# Patient Record
Sex: FEMALE | Race: WHITE | NOT HISPANIC OR LATINO
[De-identification: names, ages, dates, MRNs, and addresses within clinical notes are randomized per-mention and may not be internally consistent; named-entity substitution may affect disease eponyms.]

---

## 2017-04-24 ENCOUNTER — APPOINTMENT (OUTPATIENT)
Dept: INTERNAL MEDICINE | Facility: CLINIC | Age: 72
End: 2017-04-24

## 2017-04-24 VITALS
SYSTOLIC BLOOD PRESSURE: 129 MMHG | BODY MASS INDEX: 25.21 KG/M2 | DIASTOLIC BLOOD PRESSURE: 80 MMHG | HEART RATE: 91 BPM | OXYGEN SATURATION: 97 % | HEIGHT: 62 IN | WEIGHT: 137 LBS

## 2017-04-24 DIAGNOSIS — Z87.440 PERSONAL HISTORY OF URINARY (TRACT) INFECTIONS: ICD-10-CM

## 2017-04-24 DIAGNOSIS — Z83.49 FAMILY HISTORY OF OTHER ENDOCRINE, NUTRITIONAL AND METABOLIC DISEASES: ICD-10-CM

## 2017-04-24 DIAGNOSIS — Z82.61 FAMILY HISTORY OF ARTHRITIS: ICD-10-CM

## 2017-04-24 DIAGNOSIS — Z82.69 FAMILY HISTORY OF OTHER DISEASES OF THE MUSCULOSKELETAL SYSTEM AND CONNECTIVE TISSUE: ICD-10-CM

## 2017-04-24 DIAGNOSIS — Z87.891 PERSONAL HISTORY OF NICOTINE DEPENDENCE: ICD-10-CM

## 2017-04-24 DIAGNOSIS — Z82.49 FAMILY HISTORY OF ISCHEMIC HEART DISEASE AND OTHER DISEASES OF THE CIRCULATORY SYSTEM: ICD-10-CM

## 2017-04-24 DIAGNOSIS — Z83.6 FAMILY HISTORY OF OTHER DISEASES OF THE RESPIRATORY SYSTEM: ICD-10-CM

## 2017-04-24 DIAGNOSIS — Z78.9 OTHER SPECIFIED HEALTH STATUS: ICD-10-CM

## 2017-04-26 PROBLEM — Z82.49 FAMILY HISTORY OF HYPERTENSION: Status: ACTIVE | Noted: 2017-04-24

## 2017-04-26 PROBLEM — Z83.6 FAMILY HISTORY OF PULMONARY FIBROSIS: Status: ACTIVE | Noted: 2017-04-26

## 2017-05-03 ENCOUNTER — FORM ENCOUNTER (OUTPATIENT)
Age: 72
End: 2017-05-03

## 2017-05-04 ENCOUNTER — OUTPATIENT (OUTPATIENT)
Dept: OUTPATIENT SERVICES | Facility: HOSPITAL | Age: 72
LOS: 1 days | End: 2017-05-04
Payer: MEDICARE

## 2017-05-04 ENCOUNTER — APPOINTMENT (OUTPATIENT)
Dept: MAMMOGRAPHY | Facility: IMAGING CENTER | Age: 72
End: 2017-05-04

## 2017-05-04 ENCOUNTER — APPOINTMENT (OUTPATIENT)
Dept: RADIOLOGY | Facility: IMAGING CENTER | Age: 72
End: 2017-05-04

## 2017-05-04 ENCOUNTER — APPOINTMENT (OUTPATIENT)
Dept: ULTRASOUND IMAGING | Facility: IMAGING CENTER | Age: 72
End: 2017-05-04

## 2017-05-04 DIAGNOSIS — Z00.8 ENCOUNTER FOR OTHER GENERAL EXAMINATION: ICD-10-CM

## 2017-05-04 PROCEDURE — 76775 US EXAM ABDO BACK WALL LIM: CPT

## 2017-05-04 PROCEDURE — 77063 BREAST TOMOSYNTHESIS BI: CPT

## 2017-05-04 PROCEDURE — 77067 SCR MAMMO BI INCL CAD: CPT

## 2017-05-08 LAB
25(OH)D3 SERPL-MCNC: 50.4 NG/ML
ALBUMIN SERPL ELPH-MCNC: 4.5 G/DL
ALP BLD-CCNC: 91 U/L
ALT SERPL-CCNC: 12 U/L
ANION GAP SERPL CALC-SCNC: 18 MMOL/L
AST SERPL-CCNC: 21 U/L
BASOPHILS # BLD AUTO: 0.04 K/UL
BASOPHILS NFR BLD AUTO: 0.5 %
BILIRUB SERPL-MCNC: 0.3 MG/DL
BUN SERPL-MCNC: 16 MG/DL
CALCIUM SERPL-MCNC: 9.9 MG/DL
CHLORIDE SERPL-SCNC: 100 MMOL/L
CHOLEST SERPL-MCNC: 241 MG/DL
CHOLEST/HDLC SERPL: 2.6 RATIO
CO2 SERPL-SCNC: 22 MMOL/L
CREAT SERPL-MCNC: 0.79 MG/DL
EOSINOPHIL # BLD AUTO: 0.16 K/UL
EOSINOPHIL NFR BLD AUTO: 2 %
GLUCOSE SERPL-MCNC: 95 MG/DL
HCT VFR BLD CALC: 39.8 %
HCV AB SER QL: NONREACTIVE
HCV S/CO RATIO: 0.15 S/CO
HDLC SERPL-MCNC: 94 MG/DL
HGB BLD-MCNC: 13.5 G/DL
IMM GRANULOCYTES NFR BLD AUTO: 0.3 %
LDLC SERPL CALC-MCNC: 128 MG/DL
LYMPHOCYTES # BLD AUTO: 2.44 K/UL
LYMPHOCYTES NFR BLD AUTO: 30.5 %
MAN DIFF?: NORMAL
MCHC RBC-ENTMCNC: 31.3 PG
MCHC RBC-ENTMCNC: 33.9 GM/DL
MCV RBC AUTO: 92.3 FL
MONOCYTES # BLD AUTO: 0.58 K/UL
MONOCYTES NFR BLD AUTO: 7.3 %
NEUTROPHILS # BLD AUTO: 4.76 K/UL
NEUTROPHILS NFR BLD AUTO: 59.4 %
PLATELET # BLD AUTO: 255 K/UL
POTASSIUM SERPL-SCNC: 4.7 MMOL/L
PROT SERPL-MCNC: 7.3 G/DL
RBC # BLD: 4.31 M/UL
RBC # FLD: 12.3 %
SODIUM SERPL-SCNC: 140 MMOL/L
TRIGL SERPL-MCNC: 95 MG/DL
VZV AB TITR SER: POSITIVE
VZV IGG SER IF-ACNC: 421.4 INDEX
WBC # FLD AUTO: 8 K/UL

## 2017-09-29 ENCOUNTER — MEDICATION RENEWAL (OUTPATIENT)
Age: 72
End: 2017-09-29

## 2018-04-23 ENCOUNTER — RX RENEWAL (OUTPATIENT)
Age: 73
End: 2018-04-23

## 2018-07-25 ENCOUNTER — APPOINTMENT (OUTPATIENT)
Dept: INTERNAL MEDICINE | Facility: CLINIC | Age: 73
End: 2018-07-25
Payer: MEDICARE

## 2018-07-25 VITALS
BODY MASS INDEX: 24.66 KG/M2 | HEART RATE: 83 BPM | WEIGHT: 134 LBS | HEIGHT: 61.75 IN | SYSTOLIC BLOOD PRESSURE: 120 MMHG | OXYGEN SATURATION: 97 % | DIASTOLIC BLOOD PRESSURE: 70 MMHG

## 2018-07-25 DIAGNOSIS — R19.8 OTHER SPECIFIED SYMPTOMS AND SIGNS INVOLVING THE DIGESTIVE SYSTEM AND ABDOMEN: ICD-10-CM

## 2018-07-25 PROCEDURE — G0439: CPT | Mod: 25

## 2018-07-25 PROCEDURE — 69210 REMOVE IMPACTED EAR WAX UNI: CPT

## 2018-07-25 PROCEDURE — G0009: CPT

## 2018-07-25 PROCEDURE — 90670 PCV13 VACCINE IM: CPT

## 2018-07-25 RX ORDER — MULTIVITAMIN
TABLET ORAL
Refills: 0 | Status: DISCONTINUED | COMMUNITY
End: 2018-07-25

## 2018-07-25 NOTE — PAST MEDICAL HISTORY
[Postmenopausal] : history of menopause having occurred [Menopause Age____] : age at menopause was [unfilled] [Total Preg ___] : G: [unfilled]

## 2018-08-01 LAB
25(OH)D3 SERPL-MCNC: 50 NG/ML
ALBUMIN SERPL ELPH-MCNC: 4.8 G/DL
ALP BLD-CCNC: 89 U/L
ALT SERPL-CCNC: 11 U/L
ANION GAP SERPL CALC-SCNC: 15 MMOL/L
AST SERPL-CCNC: 20 U/L
BASOPHILS # BLD AUTO: 0.02 K/UL
BASOPHILS NFR BLD AUTO: 0.3 %
BILIRUB SERPL-MCNC: 0.4 MG/DL
BUN SERPL-MCNC: 13 MG/DL
CALCIUM SERPL-MCNC: 10.2 MG/DL
CHLORIDE SERPL-SCNC: 101 MMOL/L
CHOLEST SERPL-MCNC: 168 MG/DL
CHOLEST/HDLC SERPL: 1.9 RATIO
CO2 SERPL-SCNC: 25 MMOL/L
CREAT SERPL-MCNC: 0.8 MG/DL
EOSINOPHIL # BLD AUTO: 0.19 K/UL
EOSINOPHIL NFR BLD AUTO: 3.3 %
GLUCOSE SERPL-MCNC: 105 MG/DL
HCT VFR BLD CALC: 41.3 %
HDLC SERPL-MCNC: 90 MG/DL
HGB BLD-MCNC: 13.8 G/DL
IMM GRANULOCYTES NFR BLD AUTO: 0.2 %
LDLC SERPL CALC-MCNC: 65 MG/DL
LYMPHOCYTES # BLD AUTO: 1.71 K/UL
LYMPHOCYTES NFR BLD AUTO: 29.6 %
MAN DIFF?: NORMAL
MCHC RBC-ENTMCNC: 31.4 PG
MCHC RBC-ENTMCNC: 33.4 GM/DL
MCV RBC AUTO: 94.1 FL
MONOCYTES # BLD AUTO: 0.6 K/UL
MONOCYTES NFR BLD AUTO: 10.4 %
NEUTROPHILS # BLD AUTO: 3.25 K/UL
NEUTROPHILS NFR BLD AUTO: 56.2 %
PLATELET # BLD AUTO: 246 K/UL
POTASSIUM SERPL-SCNC: 5 MMOL/L
PROT SERPL-MCNC: 7.3 G/DL
RBC # BLD: 4.39 M/UL
RBC # FLD: 12.5 %
SODIUM SERPL-SCNC: 141 MMOL/L
TRIGL SERPL-MCNC: 65 MG/DL
WBC # FLD AUTO: 5.78 K/UL

## 2018-08-07 ENCOUNTER — FORM ENCOUNTER (OUTPATIENT)
Age: 73
End: 2018-08-07

## 2018-08-08 ENCOUNTER — APPOINTMENT (OUTPATIENT)
Dept: RADIOLOGY | Facility: IMAGING CENTER | Age: 73
End: 2018-08-08
Payer: MEDICARE

## 2018-08-08 ENCOUNTER — APPOINTMENT (OUTPATIENT)
Dept: MAMMOGRAPHY | Facility: IMAGING CENTER | Age: 73
End: 2018-08-08
Payer: MEDICARE

## 2018-08-08 ENCOUNTER — OUTPATIENT (OUTPATIENT)
Dept: OUTPATIENT SERVICES | Facility: HOSPITAL | Age: 73
LOS: 1 days | End: 2018-08-08
Payer: MEDICARE

## 2018-08-08 DIAGNOSIS — M85.80 OTHER SPECIFIED DISORDERS OF BONE DENSITY AND STRUCTURE, UNSPECIFIED SITE: ICD-10-CM

## 2018-08-08 PROCEDURE — 77063 BREAST TOMOSYNTHESIS BI: CPT | Mod: 26

## 2018-08-08 PROCEDURE — 77067 SCR MAMMO BI INCL CAD: CPT | Mod: 26

## 2018-08-08 PROCEDURE — 77080 DXA BONE DENSITY AXIAL: CPT | Mod: 26

## 2018-08-08 PROCEDURE — 77063 BREAST TOMOSYNTHESIS BI: CPT

## 2018-08-08 PROCEDURE — 77080 DXA BONE DENSITY AXIAL: CPT

## 2018-08-08 PROCEDURE — 77067 SCR MAMMO BI INCL CAD: CPT

## 2018-08-22 ENCOUNTER — TRANSCRIPTION ENCOUNTER (OUTPATIENT)
Age: 73
End: 2018-08-22

## 2018-08-23 NOTE — ADDENDUM
[FreeTextEntry1] : 8/1/18:  Discussed labs with pt, nl except glucose 105 slightly elevated, to watch diet, notes had colonoscopy 2013, will try to get records.\par 8/23/18:  Mammo birads 1, dexa osteopenia, no medicine needed, advised calcium and weight bearing exercises - mailed to pt.

## 2018-08-23 NOTE — REVIEW OF SYSTEMS
[Negative] : Integumentary [Fever] : no fever [Chills] : no chills [Fatigue] : no fatigue [Recent Change In Weight] : ~T no recent weight change [Chest Pain] : no chest pain [Palpitations] : no palpitations [Shortness Of Breath] : no shortness of breath [Cough] : no cough [Abdominal Pain] : no abdominal pain [Nausea] : no nausea [Constipation] : no constipation [Diarrhea] : diarrhea [Vomiting] : no vomiting [Heartburn] : no heartburn [Melena] : no melena [Dysuria] : no dysuria [Vaginal Discharge] : no vaginal discharge [Dizziness] : no dizziness [Fainting] : no fainting [Anxiety] : no anxiety [Depression] : no depression [Easy Bleeding] : no easy bleeding [Easy Bruising] : no easy bruising [Swollen Glands] : no swollen glands [FreeTextEntry3] : glasses for reading [FreeTextEntry4] : see hpi

## 2018-08-23 NOTE — HISTORY OF PRESENT ILLNESS
[FreeTextEntry1] : physical [de-identified] : 71 yo female with h/o as below here for CPE.\par While away, stomach was bothering her, gassy, not sure if something she ate, not a regular occurrence.  Now that she is back home has been feeling fine.\par No other active issues, feeling well.

## 2018-08-23 NOTE — ASSESSMENT
[FreeTextEntry1] : 71 yo female with h/o as above including hyperlipidemia, osteopenia, occasional tinnitus, here for CPE.\par 1.  CV - bp at goal, check lipids on statin\par 2.  ENT - partial right cerumen disimpaction performed with small ear scopette with removal of good amount of cerumen without complications and good visualization of TM; minimal tinnitus not bothersome to pt, will observe\par 3.  Gyn - no further paps needed for age; rx mammo given\par 4.  Endo - rx DEXA given as due (might have gone last year but do not have report, pt will check), check vitamin D\par 5.  GI - pt will try to obtain last colonoscopy for our records\par 6.  HCM - check below labs; prevnar today, pneumovax next year, to check on tdap/shingrix if covered and may come back\par 7.  RTO prn or 1 year

## 2018-08-23 NOTE — PHYSICAL EXAM
[No Acute Distress] : no acute distress [Well Nourished] : well nourished [Well Developed] : well developed [Well-Appearing] : well-appearing [PERRL] : pupils equal round and reactive to light [Normal Oropharynx] : the oropharynx was normal [Normal Nasal Mucosa] : the nasal mucosa was normal [Supple] : supple [No Lymphadenopathy] : no lymphadenopathy [Thyroid Normal, No Nodules] : the thyroid was normal and there were no nodules present [No Respiratory Distress] : no respiratory distress  [Clear to Auscultation] : lungs were clear to auscultation bilaterally [No Accessory Muscle Use] : no accessory muscle use [Normal Rate] : normal rate  [Regular Rhythm] : with a regular rhythm [Normal S1, S2] : normal S1 and S2 [No Murmur] : no murmur heard [No Carotid Bruits] : no carotid bruits [No Varicosities] : no varicosities [Pedal Pulses Present] : the pedal pulses are present [No Edema] : there was no peripheral edema [Normal Appearance] : normal in appearance [No Nipple Discharge] : no nipple discharge [No Axillary Lymphadenopathy] : no axillary lymphadenopathy [Soft] : abdomen soft [Non Tender] : non-tender [Non-distended] : non-distended [No Masses] : no abdominal mass palpated [No HSM] : no HSM [Normal Bowel Sounds] : normal bowel sounds [Normal Supraclavicular Nodes] : no supraclavicular lymphadenopathy [Normal Axillary Nodes] : no axillary lymphadenopathy [Normal Posterior Cervical Nodes] : no posterior cervical lymphadenopathy [Normal Anterior Cervical Nodes] : no anterior cervical lymphadenopathy [Normal Inguinal Nodes] : no inguinal lymphadenopathy [No Joint Swelling] : no joint swelling [No Rash] : no rash [Normal Gait] : normal gait [Normal Affect] : the affect was normal [de-identified] : right TM partial cerumen impaction, nl left TM

## 2018-08-23 NOTE — HEALTH RISK ASSESSMENT
[Patient reported mammogram was normal] : Patient reported mammogram was normal [No falls in past year] : Patient reported no falls in the past year [0] : 2) Feeling down, depressed, or hopeless: Not at all (0) [Single] : single [Fully functional (bathing, dressing, toileting, transferring, walking, feeding)] : Fully functional (bathing, dressing, toileting, transferring, walking, feeding) [Fully functional (using the telephone, shopping, preparing meals, housekeeping, doing laundry, using] : Fully functional and needs no help or supervision to perform IADLs (using the telephone, shopping, preparing meals, housekeeping, doing laundry, using transportation, managing medications and managing finances) [Discussed at today's visit] : Advance Directives Discussed at today's visit [Designated Healthcare Proxy] : Designated healthcare proxy [PapSmearComments] : few years ago [BoneDensityComments] : 5/15  [ColonoscopyComments] : maybe 5 years ago, will send

## 2019-04-11 ENCOUNTER — TRANSCRIPTION ENCOUNTER (OUTPATIENT)
Age: 74
End: 2019-04-11

## 2019-07-31 ENCOUNTER — APPOINTMENT (OUTPATIENT)
Dept: INTERNAL MEDICINE | Facility: CLINIC | Age: 74
End: 2019-07-31
Payer: MEDICARE

## 2019-07-31 VITALS — DIASTOLIC BLOOD PRESSURE: 78 MMHG | SYSTOLIC BLOOD PRESSURE: 132 MMHG

## 2019-07-31 VITALS — DIASTOLIC BLOOD PRESSURE: 70 MMHG | SYSTOLIC BLOOD PRESSURE: 134 MMHG

## 2019-07-31 VITALS
HEART RATE: 88 BPM | OXYGEN SATURATION: 98 % | SYSTOLIC BLOOD PRESSURE: 140 MMHG | DIASTOLIC BLOOD PRESSURE: 70 MMHG | HEIGHT: 61.75 IN | BODY MASS INDEX: 24.66 KG/M2 | WEIGHT: 134 LBS

## 2019-07-31 DIAGNOSIS — H61.21 IMPACTED CERUMEN, RIGHT EAR: ICD-10-CM

## 2019-07-31 PROCEDURE — G0442 ANNUAL ALCOHOL SCREEN 15 MIN: CPT

## 2019-07-31 PROCEDURE — 99397 PER PM REEVAL EST PAT 65+ YR: CPT | Mod: 25

## 2019-07-31 PROCEDURE — 90732 PPSV23 VACC 2 YRS+ SUBQ/IM: CPT

## 2019-07-31 PROCEDURE — G0009: CPT

## 2019-07-31 PROCEDURE — G0444 DEPRESSION SCREEN ANNUAL: CPT

## 2019-07-31 RX ORDER — ASPIRIN ENTERIC COATED TABLETS 81 MG 81 MG/1
81 TABLET, DELAYED RELEASE ORAL
Refills: 0 | Status: DISCONTINUED | COMMUNITY
End: 2019-07-31

## 2019-07-31 NOTE — PAST MEDICAL HISTORY
[Postmenopausal] : history of menopause having occurred [Total Preg ___] : G: [unfilled] [Menopause Age____] : age at menopause was [unfilled]

## 2019-08-14 LAB
25(OH)D3 SERPL-MCNC: 67.4 NG/ML
ALBUMIN SERPL ELPH-MCNC: 4.6 G/DL
ALP BLD-CCNC: 93 U/L
ALT SERPL-CCNC: 12 U/L
ANION GAP SERPL CALC-SCNC: 15 MMOL/L
AST SERPL-CCNC: 20 U/L
BASOPHILS # BLD AUTO: 0.04 K/UL
BASOPHILS NFR BLD AUTO: 0.5 %
BILIRUB SERPL-MCNC: 0.5 MG/DL
BUN SERPL-MCNC: 11 MG/DL
CALCIUM SERPL-MCNC: 10.2 MG/DL
CHLORIDE SERPL-SCNC: 103 MMOL/L
CHOLEST SERPL-MCNC: 157 MG/DL
CHOLEST/HDLC SERPL: 1.9 RATIO
CO2 SERPL-SCNC: 23 MMOL/L
CREAT SERPL-MCNC: 0.71 MG/DL
EOSINOPHIL # BLD AUTO: 0.17 K/UL
EOSINOPHIL NFR BLD AUTO: 2.2 %
GLUCOSE SERPL-MCNC: 97 MG/DL
HCT VFR BLD CALC: 40.5 %
HDLC SERPL-MCNC: 84 MG/DL
HGB BLD-MCNC: 13.3 G/DL
IMM GRANULOCYTES NFR BLD AUTO: 0.3 %
LDLC SERPL CALC-MCNC: 59 MG/DL
LYMPHOCYTES # BLD AUTO: 1.96 K/UL
LYMPHOCYTES NFR BLD AUTO: 25.1 %
MAN DIFF?: NORMAL
MCHC RBC-ENTMCNC: 30.5 PG
MCHC RBC-ENTMCNC: 32.8 GM/DL
MCV RBC AUTO: 92.9 FL
MONOCYTES # BLD AUTO: 0.73 K/UL
MONOCYTES NFR BLD AUTO: 9.3 %
NEUTROPHILS # BLD AUTO: 4.89 K/UL
NEUTROPHILS NFR BLD AUTO: 62.6 %
PLATELET # BLD AUTO: 234 K/UL
POTASSIUM SERPL-SCNC: 4.3 MMOL/L
PROT SERPL-MCNC: 7 G/DL
RBC # BLD: 4.36 M/UL
RBC # FLD: 11.8 %
SODIUM SERPL-SCNC: 141 MMOL/L
TRIGL SERPL-MCNC: 69 MG/DL
WBC # FLD AUTO: 7.81 K/UL

## 2019-09-24 ENCOUNTER — FORM ENCOUNTER (OUTPATIENT)
Age: 74
End: 2019-09-24

## 2019-09-25 ENCOUNTER — OUTPATIENT (OUTPATIENT)
Dept: OUTPATIENT SERVICES | Facility: HOSPITAL | Age: 74
LOS: 1 days | End: 2019-09-25
Payer: MEDICARE

## 2019-09-25 ENCOUNTER — APPOINTMENT (OUTPATIENT)
Dept: MAMMOGRAPHY | Facility: IMAGING CENTER | Age: 74
End: 2019-09-25
Payer: MEDICARE

## 2019-09-25 DIAGNOSIS — Z00.00 ENCOUNTER FOR GENERAL ADULT MEDICAL EXAMINATION WITHOUT ABNORMAL FINDINGS: ICD-10-CM

## 2019-09-25 PROCEDURE — 77063 BREAST TOMOSYNTHESIS BI: CPT | Mod: 26

## 2019-09-25 PROCEDURE — 77067 SCR MAMMO BI INCL CAD: CPT | Mod: 26

## 2019-09-25 PROCEDURE — 77067 SCR MAMMO BI INCL CAD: CPT

## 2019-09-25 PROCEDURE — 77063 BREAST TOMOSYNTHESIS BI: CPT

## 2019-10-14 NOTE — HEALTH RISK ASSESSMENT
[No falls in past year] : Patient reported no falls in the past year [0] : 2) Feeling down, depressed, or hopeless: Not at all (0) [Patient reported colonoscopy was normal] : Patient reported colonoscopy was normal [Fully functional (bathing, dressing, toileting, transferring, walking, feeding)] : Fully functional (bathing, dressing, toileting, transferring, walking, feeding) [Fully functional (using the telephone, shopping, preparing meals, housekeeping, doing laundry, using] : Fully functional and needs no help or supervision to perform IADLs (using the telephone, shopping, preparing meals, housekeeping, doing laundry, using transportation, managing medications and managing finances) [Reports changes in vision] : Reports changes in vision [Designated Healthcare Proxy] : Designated healthcare proxy [Relationship: ___] : Relationship: [unfilled] [Reports changes in hearing] : Reports no changes in hearing [MammogramComments] : last year [PapSmearComments] : no further ones needed [BoneDensityComments] : utd [ColonoscopyDate] : 01/13 [ColonoscopyComments] : will try to get records [de-identified] : sees optho [AdvancecareDate] : 07/19

## 2019-10-14 NOTE — REVIEW OF SYSTEMS
[Negative] : Musculoskeletal [Fever] : no fever [Chills] : no chills [Fatigue] : no fatigue [Recent Change In Weight] : ~T no recent weight change [Chest Pain] : no chest pain [Palpitations] : no palpitations [Shortness Of Breath] : no shortness of breath [Cough] : no cough [Abdominal Pain] : no abdominal pain [Nausea] : no nausea [Constipation] : no constipation [Diarrhea] : diarrhea [Vomiting] : no vomiting [Heartburn] : no heartburn [Melena] : no melena [Dysuria] : no dysuria [Vaginal Discharge] : no vaginal discharge [Skin Rash] : no skin rash [Dizziness] : no dizziness [Fainting] : no fainting [Anxiety] : no anxiety [Depression] : no depression [Easy Bleeding] : no easy bleeding [Easy Bruising] : no easy bruising [Swollen Glands] : no swollen glands [FreeTextEntry3] : glasses [FreeTextEntry9] : some joint pains in am but resolves

## 2019-10-14 NOTE — ASSESSMENT
[FreeTextEntry1] : 72 yo female with h/o as above including hyperlipidemia, osteopenia, occasional tinnitus, here for CPE.\par 1.  CV - bp borderline but has been under good control previously, will continue to monitor, check lipids on statin\par 2.  GI - believes colonoscopy utd, to get report\par 3.  Gyn - no further paps needed for age, rx mammo given\par 4.  Endo - dexa utd, check vitamin D\par 5.  ENT - no visualized abnl on tongue/oropharynx, mucosa moist, ?dry in am if sleeping with mouth open, to monitor\par 6.  HCM - check other below labs; pneumovax today, to consider tdap (declining today), shingrix when available\par 7.  RTO prn or 1 year

## 2019-10-14 NOTE — HISTORY OF PRESENT ILLNESS
[FreeTextEntry1] : physical [de-identified] : 72 yo female with h/o as below here for CPE.\par Feeling well overall, no complaints.\par When wakes up in morning, mouth and tongue is dry, gets better otherwise in the day although sometimes water tastes different.

## 2019-10-14 NOTE — ADDENDUM
[FreeTextEntry1] : 8/14/19:  Labs nl - mailed to pt.\par 10/14/19:  Mammo birads 1 - radiology contacted pt with results.

## 2019-10-14 NOTE — PHYSICAL EXAM
[No Acute Distress] : no acute distress [Well Nourished] : well nourished [Well Developed] : well developed [Well-Appearing] : well-appearing [PERRL] : pupils equal round and reactive to light [Normal Oropharynx] : the oropharynx was normal [Normal TMs] : both tympanic membranes were normal [Normal Nasal Mucosa] : the nasal mucosa was normal [No Lymphadenopathy] : no lymphadenopathy [Supple] : supple [Thyroid Normal, No Nodules] : the thyroid was normal and there were no nodules present [No Respiratory Distress] : no respiratory distress  [No Accessory Muscle Use] : no accessory muscle use [Clear to Auscultation] : lungs were clear to auscultation bilaterally [Normal Rate] : normal rate  [Regular Rhythm] : with a regular rhythm [Normal S1, S2] : normal S1 and S2 [No Murmur] : no murmur heard [No Carotid Bruits] : no carotid bruits [Pedal Pulses Present] : the pedal pulses are present [No Edema] : there was no peripheral edema [Normal Appearance] : normal in appearance [No Nipple Discharge] : no nipple discharge [No Axillary Lymphadenopathy] : no axillary lymphadenopathy [Soft] : abdomen soft [Non Tender] : non-tender [Non-distended] : non-distended [No Masses] : no abdominal mass palpated [No HSM] : no HSM [Normal Bowel Sounds] : normal bowel sounds [Normal Supraclavicular Nodes] : no supraclavicular lymphadenopathy [Normal Axillary Nodes] : no axillary lymphadenopathy [Normal Posterior Cervical Nodes] : no posterior cervical lymphadenopathy [Normal Anterior Cervical Nodes] : no anterior cervical lymphadenopathy [Normal Inguinal Nodes] : no inguinal lymphadenopathy [No Joint Swelling] : no joint swelling [No Rash] : no rash [Normal Gait] : normal gait [Normal Affect] : the affect was normal [de-identified] : some cerumen right auditory canal

## 2019-11-13 ENCOUNTER — APPOINTMENT (OUTPATIENT)
Dept: INTERNAL MEDICINE | Facility: CLINIC | Age: 74
End: 2019-11-13
Payer: MEDICARE

## 2019-11-13 VITALS
HEART RATE: 97 BPM | DIASTOLIC BLOOD PRESSURE: 78 MMHG | BODY MASS INDEX: 24.71 KG/M2 | OXYGEN SATURATION: 98 % | SYSTOLIC BLOOD PRESSURE: 152 MMHG | WEIGHT: 134 LBS

## 2019-11-13 DIAGNOSIS — Z23 ENCOUNTER FOR IMMUNIZATION: ICD-10-CM

## 2019-11-13 DIAGNOSIS — R10.9 UNSPECIFIED ABDOMINAL PAIN: ICD-10-CM

## 2019-11-13 PROCEDURE — 90662 IIV NO PRSV INCREASED AG IM: CPT

## 2019-11-13 PROCEDURE — 99214 OFFICE O/P EST MOD 30 MIN: CPT | Mod: 25

## 2019-11-13 PROCEDURE — G0008: CPT

## 2019-11-13 NOTE — HISTORY OF PRESENT ILLNESS
[FreeTextEntry8] : C/O terrible gas pains x 2-3 weeks.  Can't sleep secondary to the pain.  Does not feel bloated.  Pain is central to all over the stomach area.  No fevers.  No N/V/D/C.  Tried OTC Gas-x without relief and apple cider vinegar without relief.  No weight loss.  C/O decreased appetite the last few weeks as well.  Had this 2 years ago that lasted a month as well.

## 2019-11-13 NOTE — ASSESSMENT
[FreeTextEntry1] : 1.  Abdominal pain located predominantly in the epigastric regions but can radiate to all quadrants.  Will check a CBC, CMP, amylase and lipase to rule out pancreatitis but symptoms have been going on for 2 to 3 weeks.  We will also obtain imaging study to rule out any neoplasm or mechanical cause of the abdominal pain.  Doubt irritable bowel given that she has been unable to sleep because of the pain.  Because of the predominance in the epigastric region we will also try OTC Prilosec to see if this helps as well while awaiting imaging studies.\par 2.  Complaints of myalgias on a statin.  To try and cut the dose in half and take half a pill daily to see if this lessens the myalgias.\par 3.  Flu shot today\par 4.  Follow-up pending above

## 2019-11-19 LAB
ALBUMIN SERPL ELPH-MCNC: 4.7 G/DL
ALP BLD-CCNC: 106 U/L
ALT SERPL-CCNC: 11 U/L
AMYLASE/CREAT SERPL: 51 U/L
ANION GAP SERPL CALC-SCNC: 14 MMOL/L
AST SERPL-CCNC: 19 U/L
BASOPHILS # BLD AUTO: 0.06 K/UL
BASOPHILS NFR BLD AUTO: 0.6 %
BILIRUB SERPL-MCNC: 0.6 MG/DL
BUN SERPL-MCNC: 10 MG/DL
CALCIUM SERPL-MCNC: 10.7 MG/DL
CHLORIDE SERPL-SCNC: 93 MMOL/L
CO2 SERPL-SCNC: 26 MMOL/L
CREAT SERPL-MCNC: 0.68 MG/DL
EOSINOPHIL # BLD AUTO: 0.11 K/UL
EOSINOPHIL NFR BLD AUTO: 1 %
GLUCOSE SERPL-MCNC: 96 MG/DL
HCT VFR BLD CALC: 43.8 %
HGB BLD-MCNC: 14.4 G/DL
IMM GRANULOCYTES NFR BLD AUTO: 0.4 %
LPL SERPL-CCNC: 21 U/L
LYMPHOCYTES # BLD AUTO: 1.75 K/UL
LYMPHOCYTES NFR BLD AUTO: 16.2 %
MAN DIFF?: NORMAL
MCHC RBC-ENTMCNC: 30.5 PG
MCHC RBC-ENTMCNC: 32.9 GM/DL
MCV RBC AUTO: 92.8 FL
MONOCYTES # BLD AUTO: 0.76 K/UL
MONOCYTES NFR BLD AUTO: 7 %
NEUTROPHILS # BLD AUTO: 8.07 K/UL
NEUTROPHILS NFR BLD AUTO: 74.8 %
PLATELET # BLD AUTO: 277 K/UL
POTASSIUM SERPL-SCNC: 4.7 MMOL/L
PROT SERPL-MCNC: 7.4 G/DL
RBC # BLD: 4.72 M/UL
RBC # FLD: 11.8 %
SODIUM SERPL-SCNC: 133 MMOL/L
WBC # FLD AUTO: 10.79 K/UL

## 2019-11-25 ENCOUNTER — FORM ENCOUNTER (OUTPATIENT)
Age: 74
End: 2019-11-25

## 2019-11-26 ENCOUNTER — OUTPATIENT (OUTPATIENT)
Dept: OUTPATIENT SERVICES | Facility: HOSPITAL | Age: 74
LOS: 1 days | End: 2019-11-26
Payer: MEDICARE

## 2019-11-26 ENCOUNTER — APPOINTMENT (OUTPATIENT)
Dept: ULTRASOUND IMAGING | Facility: CLINIC | Age: 74
End: 2019-11-26
Payer: MEDICARE

## 2019-11-26 DIAGNOSIS — R10.9 UNSPECIFIED ABDOMINAL PAIN: ICD-10-CM

## 2019-11-26 PROCEDURE — 76856 US EXAM PELVIC COMPLETE: CPT | Mod: 26

## 2019-11-26 PROCEDURE — 76700 US EXAM ABDOM COMPLETE: CPT | Mod: 26

## 2019-11-26 PROCEDURE — 76700 US EXAM ABDOM COMPLETE: CPT

## 2019-11-26 PROCEDURE — 76856 US EXAM PELVIC COMPLETE: CPT

## 2020-09-23 ENCOUNTER — APPOINTMENT (OUTPATIENT)
Dept: INTERNAL MEDICINE | Facility: CLINIC | Age: 75
End: 2020-09-23
Payer: MEDICARE

## 2020-09-23 VITALS — DIASTOLIC BLOOD PRESSURE: 70 MMHG | SYSTOLIC BLOOD PRESSURE: 110 MMHG

## 2020-09-23 VITALS
WEIGHT: 133 LBS | OXYGEN SATURATION: 97 % | BODY MASS INDEX: 24.48 KG/M2 | HEART RATE: 75 BPM | DIASTOLIC BLOOD PRESSURE: 80 MMHG | SYSTOLIC BLOOD PRESSURE: 138 MMHG | HEIGHT: 61.75 IN

## 2020-09-23 PROCEDURE — G0444 DEPRESSION SCREEN ANNUAL: CPT

## 2020-09-23 PROCEDURE — G0439: CPT | Mod: 25

## 2020-09-23 PROCEDURE — G0008: CPT

## 2020-09-23 PROCEDURE — 90662 IIV NO PRSV INCREASED AG IM: CPT

## 2020-09-23 RX ORDER — ATORVASTATIN CALCIUM 10 MG/1
10 TABLET, FILM COATED ORAL
Qty: 1 | Refills: 3 | Status: DISCONTINUED | COMMUNITY
Start: 2017-05-08 | End: 2020-09-23

## 2020-09-30 LAB
25(OH)D3 SERPL-MCNC: 71.7 NG/ML
ALBUMIN SERPL ELPH-MCNC: 4.7 G/DL
ALP BLD-CCNC: 97 U/L
ALT SERPL-CCNC: 11 U/L
ANION GAP SERPL CALC-SCNC: 15 MMOL/L
AST SERPL-CCNC: 22 U/L
BASOPHILS # BLD AUTO: 0.04 K/UL
BASOPHILS NFR BLD AUTO: 0.6 %
BILIRUB SERPL-MCNC: 0.3 MG/DL
BUN SERPL-MCNC: 14 MG/DL
CALCIUM SERPL-MCNC: 9.8 MG/DL
CHLORIDE SERPL-SCNC: 100 MMOL/L
CHOLEST SERPL-MCNC: 213 MG/DL
CHOLEST/HDLC SERPL: 2.7 RATIO
CO2 SERPL-SCNC: 23 MMOL/L
CREAT SERPL-MCNC: 0.78 MG/DL
EOSINOPHIL # BLD AUTO: 0.13 K/UL
EOSINOPHIL NFR BLD AUTO: 2 %
FERRITIN SERPL-MCNC: 163 NG/ML
GLUCOSE SERPL-MCNC: 99 MG/DL
HCT VFR BLD CALC: 40.2 %
HDLC SERPL-MCNC: 79 MG/DL
HGB BLD-MCNC: 13.4 G/DL
IMM GRANULOCYTES NFR BLD AUTO: 0.5 %
IRON SATN MFR SERPL: 42 %
IRON SERPL-MCNC: 116 UG/DL
LDLC SERPL CALC-MCNC: 118 MG/DL
LYMPHOCYTES # BLD AUTO: 1.84 K/UL
LYMPHOCYTES NFR BLD AUTO: 28.6 %
MAN DIFF?: NORMAL
MCHC RBC-ENTMCNC: 31.3 PG
MCHC RBC-ENTMCNC: 33.3 GM/DL
MCV RBC AUTO: 93.9 FL
MONOCYTES # BLD AUTO: 0.61 K/UL
MONOCYTES NFR BLD AUTO: 9.5 %
NEUTROPHILS # BLD AUTO: 3.79 K/UL
NEUTROPHILS NFR BLD AUTO: 58.8 %
PLATELET # BLD AUTO: 227 K/UL
POTASSIUM SERPL-SCNC: 4.4 MMOL/L
PROT SERPL-MCNC: 7 G/DL
RBC # BLD: 4.28 M/UL
RBC # FLD: 11.9 %
SODIUM SERPL-SCNC: 138 MMOL/L
TIBC SERPL-MCNC: 277 UG/DL
TRIGL SERPL-MCNC: 81 MG/DL
UIBC SERPL-MCNC: 161 UG/DL
WBC # FLD AUTO: 6.44 K/UL

## 2020-10-01 ENCOUNTER — OUTPATIENT (OUTPATIENT)
Dept: OUTPATIENT SERVICES | Facility: HOSPITAL | Age: 75
LOS: 1 days | End: 2020-10-01
Payer: MEDICARE

## 2020-10-01 ENCOUNTER — APPOINTMENT (OUTPATIENT)
Dept: ULTRASOUND IMAGING | Facility: CLINIC | Age: 75
End: 2020-10-01
Payer: MEDICARE

## 2020-10-01 DIAGNOSIS — Z00.8 ENCOUNTER FOR OTHER GENERAL EXAMINATION: ICD-10-CM

## 2020-10-01 PROCEDURE — 76705 ECHO EXAM OF ABDOMEN: CPT | Mod: 26

## 2020-10-01 PROCEDURE — 76705 ECHO EXAM OF ABDOMEN: CPT

## 2020-10-12 ENCOUNTER — APPOINTMENT (OUTPATIENT)
Dept: RADIOLOGY | Facility: IMAGING CENTER | Age: 75
End: 2020-10-12
Payer: MEDICARE

## 2020-10-12 ENCOUNTER — RESULT REVIEW (OUTPATIENT)
Age: 75
End: 2020-10-12

## 2020-10-12 ENCOUNTER — APPOINTMENT (OUTPATIENT)
Dept: MAMMOGRAPHY | Facility: IMAGING CENTER | Age: 75
End: 2020-10-12
Payer: MEDICARE

## 2020-10-12 ENCOUNTER — OUTPATIENT (OUTPATIENT)
Dept: OUTPATIENT SERVICES | Facility: HOSPITAL | Age: 75
LOS: 1 days | End: 2020-10-12
Payer: MEDICARE

## 2020-10-12 DIAGNOSIS — Z00.8 ENCOUNTER FOR OTHER GENERAL EXAMINATION: ICD-10-CM

## 2020-10-12 PROCEDURE — 77067 SCR MAMMO BI INCL CAD: CPT | Mod: 26

## 2020-10-12 PROCEDURE — 77063 BREAST TOMOSYNTHESIS BI: CPT

## 2020-10-12 PROCEDURE — 77067 SCR MAMMO BI INCL CAD: CPT

## 2020-10-12 PROCEDURE — 77063 BREAST TOMOSYNTHESIS BI: CPT | Mod: 26

## 2020-10-12 PROCEDURE — 77080 DXA BONE DENSITY AXIAL: CPT | Mod: 26

## 2020-10-12 PROCEDURE — 77080 DXA BONE DENSITY AXIAL: CPT

## 2020-10-14 NOTE — ASSESSMENT
[FreeTextEntry1] : 75 yo female with h/o as above including hyperlipidemia, osteopenia here for CPE.\par 1.  CV - bp at goal, check lipids off statin (stopped due to myopathy), if very elevated would consider starting crestor\par 2.  GI - some upper abdominal fullness and ?hepatomegaly, will check abdominal sono; colonoscopy utd\par 3.  Gyn - no further paps needed for age; rx mammo given as due\par 4.  Endo - check dexa as due, vitamin D\par 5.  HCM - check below labs, including iron given fam hx hemochromatosis; flu shot today, to see if shingrix covered in office or at pharmacy, consider tdap\par 6.  RTO prn or 1 year \par

## 2020-10-14 NOTE — PHYSICAL EXAM
[No Acute Distress] : no acute distress [Well Nourished] : well nourished [Well Developed] : well developed [Well-Appearing] : well-appearing [PERRL] : pupils equal round and reactive to light [Normal Oropharynx] : the oropharynx was normal [Normal TMs] : both tympanic membranes were normal [No Lymphadenopathy] : no lymphadenopathy [Supple] : supple [Thyroid Normal, No Nodules] : the thyroid was normal and there were no nodules present [No Respiratory Distress] : no respiratory distress  [No Accessory Muscle Use] : no accessory muscle use [Clear to Auscultation] : lungs were clear to auscultation bilaterally [Normal Rate] : normal rate  [Regular Rhythm] : with a regular rhythm [Normal S1, S2] : normal S1 and S2 [No Murmur] : no murmur heard [No Carotid Bruits] : no carotid bruits [Pedal Pulses Present] : the pedal pulses are present [No Edema] : there was no peripheral edema [Normal Appearance] : normal in appearance [No Nipple Discharge] : no nipple discharge [No Axillary Lymphadenopathy] : no axillary lymphadenopathy [Soft] : abdomen soft [Non Tender] : non-tender [Non-distended] : non-distended [No Masses] : no abdominal mass palpated [Normal Bowel Sounds] : normal bowel sounds [Normal Supraclavicular Nodes] : no supraclavicular lymphadenopathy [Normal Axillary Nodes] : no axillary lymphadenopathy [Normal Posterior Cervical Nodes] : no posterior cervical lymphadenopathy [Normal Anterior Cervical Nodes] : no anterior cervical lymphadenopathy [Normal Inguinal Nodes] : no inguinal lymphadenopathy [No Joint Swelling] : no joint swelling [No Rash] : no rash [Normal Gait] : normal gait [Normal Affect] : the affect was normal [de-identified] : narrow left auditory canal [de-identified] : ?hepatomegaly, fullness across upper abdomen, no discrete masses

## 2020-10-14 NOTE — REVIEW OF SYSTEMS
[Negative] : Musculoskeletal [Fever] : no fever [Chills] : no chills [Fatigue] : no fatigue [Recent Change In Weight] : ~T no recent weight change [Chest Pain] : no chest pain [Palpitations] : no palpitations [Shortness Of Breath] : no shortness of breath [Cough] : no cough [Abdominal Pain] : no abdominal pain [Nausea] : no nausea [Constipation] : no constipation [Diarrhea] : diarrhea [Vomiting] : no vomiting [Heartburn] : no heartburn [Melena] : no melena [Dysuria] : no dysuria [Vaginal Discharge] : no vaginal discharge [Skin Rash] : no skin rash [Dizziness] : no dizziness [Fainting] : no fainting [Anxiety] : no anxiety [Depression] : no depression [Easy Bleeding] : no easy bleeding [Easy Bruising] : no easy bruising [Swollen Glands] : no swollen glands

## 2020-10-14 NOTE — HISTORY OF PRESENT ILLNESS
[FreeTextEntry1] : physical [de-identified] : 73 yo female with h/o as below here for CPE.\par Feeling well overall, no complaints.  \par Was having a lot of pains in muscles, so stopped statin, as soon as stopped, they disappeared.\par Has been golfing, walks daily, sees friends, socially distancing and wearing masks.

## 2020-10-14 NOTE — ADDENDUM
[FreeTextEntry1] : 9/30/20:   Discussed labs with pt, nl except  off statin, interested in trying another statin, will start crestor 5 mg along with coQ10 and watch for myalgias.\par 10/1/20:  Abdominal US nl, d/w pt.\par 10/14/20:  Reviewed imaging, mammo birads 1, dexa showing osteopenia with low FRAX score, doesn't need medication but should have calcium and weight bearing exercises - mailed to pt.

## 2020-10-14 NOTE — HEALTH RISK ASSESSMENT
[No falls in past year] : Patient reported no falls in the past year [Patient reported colonoscopy was normal] : Patient reported colonoscopy was normal [Fully functional (bathing, dressing, toileting, transferring, walking, feeding)] : Fully functional (bathing, dressing, toileting, transferring, walking, feeding) [Fully functional (using the telephone, shopping, preparing meals, housekeeping, doing laundry, using] : Fully functional and needs no help or supervision to perform IADLs (using the telephone, shopping, preparing meals, housekeeping, doing laundry, using transportation, managing medications and managing finances) [Designated Healthcare Proxy] : Designated healthcare proxy [Relationship: ___] : Relationship: [unfilled] [0] : 2) Feeling down, depressed, or hopeless: Not at all (0) [MammogramComments] : due now [PapSmearComments] : no longer [BoneDensityComments] : due now [ColonoscopyDate] : 01/13 [AdvancecareDate] : 09/20

## 2020-10-19 ENCOUNTER — APPOINTMENT (OUTPATIENT)
Dept: ULTRASOUND IMAGING | Facility: IMAGING CENTER | Age: 75
End: 2020-10-19

## 2020-10-19 ENCOUNTER — APPOINTMENT (OUTPATIENT)
Dept: MAMMOGRAPHY | Facility: IMAGING CENTER | Age: 75
End: 2020-10-19

## 2020-10-19 ENCOUNTER — APPOINTMENT (OUTPATIENT)
Dept: RADIOLOGY | Facility: IMAGING CENTER | Age: 75
End: 2020-10-19

## 2021-03-23 ENCOUNTER — APPOINTMENT (OUTPATIENT)
Dept: INTERNAL MEDICINE | Facility: CLINIC | Age: 76
End: 2021-03-23
Payer: MEDICARE

## 2021-03-23 ENCOUNTER — RX RENEWAL (OUTPATIENT)
Age: 76
End: 2021-03-23

## 2021-03-23 PROCEDURE — 99441: CPT

## 2021-04-09 ENCOUNTER — NON-APPOINTMENT (OUTPATIENT)
Age: 76
End: 2021-04-09

## 2021-09-24 ENCOUNTER — MED ADMIN CHARGE (OUTPATIENT)
Age: 76
End: 2021-09-24

## 2021-09-24 ENCOUNTER — APPOINTMENT (OUTPATIENT)
Dept: INTERNAL MEDICINE | Facility: CLINIC | Age: 76
End: 2021-09-24
Payer: MEDICARE

## 2021-09-24 VITALS
HEART RATE: 83 BPM | SYSTOLIC BLOOD PRESSURE: 140 MMHG | WEIGHT: 126 LBS | BODY MASS INDEX: 23.19 KG/M2 | OXYGEN SATURATION: 99 % | HEIGHT: 61.75 IN | DIASTOLIC BLOOD PRESSURE: 80 MMHG

## 2021-09-24 VITALS — DIASTOLIC BLOOD PRESSURE: 70 MMHG | SYSTOLIC BLOOD PRESSURE: 124 MMHG

## 2021-09-24 DIAGNOSIS — Z87.19 PERSONAL HISTORY OF OTHER DISEASES OF THE DIGESTIVE SYSTEM: ICD-10-CM

## 2021-09-24 DIAGNOSIS — R19.8 OTHER SPECIFIED SYMPTOMS AND SIGNS INVOLVING THE DIGESTIVE SYSTEM AND ABDOMEN: ICD-10-CM

## 2021-09-24 DIAGNOSIS — Z92.29 PERSONAL HISTORY OF OTHER DRUG THERAPY: ICD-10-CM

## 2021-09-24 PROCEDURE — 90662 IIV NO PRSV INCREASED AG IM: CPT

## 2021-09-24 PROCEDURE — G0008: CPT

## 2021-09-24 PROCEDURE — G0439: CPT

## 2021-09-24 PROCEDURE — G0444 DEPRESSION SCREEN ANNUAL: CPT | Mod: 59

## 2021-09-24 RX ORDER — MULTIVITAMIN
TABLET ORAL
Refills: 0 | Status: DISCONTINUED | COMMUNITY
End: 2021-09-24

## 2021-09-26 PROBLEM — Z92.29 HISTORY OF PNEUMOCOCCAL VACCINATION: Status: RESOLVED | Noted: 2019-07-31 | Resolved: 2021-09-26

## 2021-09-26 PROBLEM — Z87.19 HISTORY OF GASTRITIS: Status: RESOLVED | Noted: 2021-09-26 | Resolved: 2021-09-26

## 2021-09-26 LAB
25(OH)D3 SERPL-MCNC: 63.1 NG/ML
ALBUMIN SERPL ELPH-MCNC: 4.8 G/DL
ALP BLD-CCNC: 98 U/L
ALT SERPL-CCNC: 12 U/L
ANION GAP SERPL CALC-SCNC: 11 MMOL/L
AST SERPL-CCNC: 20 U/L
BASOPHILS # BLD AUTO: 0.04 K/UL
BASOPHILS NFR BLD AUTO: 0.7 %
BILIRUB SERPL-MCNC: 0.4 MG/DL
BUN SERPL-MCNC: 13 MG/DL
CALCIUM SERPL-MCNC: 10.1 MG/DL
CHLORIDE SERPL-SCNC: 99 MMOL/L
CHOLEST SERPL-MCNC: 179 MG/DL
CO2 SERPL-SCNC: 25 MMOL/L
CREAT SERPL-MCNC: 0.7 MG/DL
EOSINOPHIL # BLD AUTO: 0.11 K/UL
EOSINOPHIL NFR BLD AUTO: 2 %
GLUCOSE SERPL-MCNC: 102 MG/DL
HCT VFR BLD CALC: 43.1 %
HDLC SERPL-MCNC: 85 MG/DL
HGB BLD-MCNC: 13.9 G/DL
IMM GRANULOCYTES NFR BLD AUTO: 0.2 %
LDLC SERPL CALC-MCNC: 79 MG/DL
LYMPHOCYTES # BLD AUTO: 1.34 K/UL
LYMPHOCYTES NFR BLD AUTO: 24.1 %
MAN DIFF?: NORMAL
MCHC RBC-ENTMCNC: 30.9 PG
MCHC RBC-ENTMCNC: 32.3 GM/DL
MCV RBC AUTO: 95.8 FL
MONOCYTES # BLD AUTO: 0.63 K/UL
MONOCYTES NFR BLD AUTO: 11.4 %
NEUTROPHILS # BLD AUTO: 3.42 K/UL
NEUTROPHILS NFR BLD AUTO: 61.6 %
NONHDLC SERPL-MCNC: 95 MG/DL
PLATELET # BLD AUTO: 269 K/UL
POTASSIUM SERPL-SCNC: 4.4 MMOL/L
PROT SERPL-MCNC: 7.2 G/DL
RBC # BLD: 4.5 M/UL
RBC # FLD: 12.4 %
SODIUM SERPL-SCNC: 135 MMOL/L
TRIGL SERPL-MCNC: 78 MG/DL
WBC # FLD AUTO: 5.55 K/UL

## 2021-09-26 NOTE — REVIEW OF SYSTEMS
[Negative] : Musculoskeletal [Fever] : no fever [Chills] : no chills [Fatigue] : no fatigue [Recent Change In Weight] : ~T no recent weight change [Chest Pain] : no chest pain [Palpitations] : no palpitations [Shortness Of Breath] : no shortness of breath [Cough] : no cough [Abdominal Pain] : no abdominal pain [Nausea] : no nausea [Diarrhea] : diarrhea [Constipation] : no constipation [Vomiting] : no vomiting [Heartburn] : no heartburn [Melena] : no melena [Dysuria] : no dysuria [Vaginal Discharge] : no vaginal discharge [Skin Rash] : no skin rash [Headache] : no headache [Dizziness] : no dizziness [Fainting] : no fainting [Anxiety] : no anxiety [Depression] : no depression [Easy Bleeding] : no easy bleeding [Easy Bruising] : no easy bruising [Swollen Glands] : no swollen glands

## 2021-09-26 NOTE — PHYSICAL EXAM
[No Acute Distress] : no acute distress [Well Nourished] : well nourished [Well Developed] : well developed [Well-Appearing] : well-appearing [PERRL] : pupils equal round and reactive to light [EOMI] : extraocular movements intact [Normal TMs] : both tympanic membranes were normal [No Lymphadenopathy] : no lymphadenopathy [Thyroid Normal, No Nodules] : the thyroid was normal and there were no nodules present [Supple] : supple [No Respiratory Distress] : no respiratory distress  [No Accessory Muscle Use] : no accessory muscle use [Clear to Auscultation] : lungs were clear to auscultation bilaterally [Normal Rate] : normal rate  [Regular Rhythm] : with a regular rhythm [Normal S1, S2] : normal S1 and S2 [No Carotid Bruits] : no carotid bruits [No Murmur] : no murmur heard [Pedal Pulses Present] : the pedal pulses are present [No Edema] : there was no peripheral edema [Normal Appearance] : normal in appearance [No Nipple Discharge] : no nipple discharge [No Axillary Lymphadenopathy] : no axillary lymphadenopathy [Soft] : abdomen soft [Non Tender] : non-tender [Non-distended] : non-distended [No Masses] : no abdominal mass palpated [No HSM] : no HSM [Normal Bowel Sounds] : normal bowel sounds [Normal Supraclavicular Nodes] : no supraclavicular lymphadenopathy [Normal Posterior Cervical Nodes] : no posterior cervical lymphadenopathy [Normal Axillary Nodes] : no axillary lymphadenopathy [Normal Anterior Cervical Nodes] : no anterior cervical lymphadenopathy [Normal Inguinal Nodes] : no inguinal lymphadenopathy [No Joint Swelling] : no joint swelling [No Rash] : no rash [Normal Gait] : normal gait [Normal Affect] : the affect was normal

## 2021-09-26 NOTE — HEALTH RISK ASSESSMENT
[No falls in past year] : Patient reported no falls in the past year [Patient reported colonoscopy was normal] : Patient reported colonoscopy was normal [Fully functional (bathing, dressing, toileting, transferring, walking, feeding)] : Fully functional (bathing, dressing, toileting, transferring, walking, feeding) [Fully functional (using the telephone, shopping, preparing meals, housekeeping, doing laundry, using] : Fully functional and needs no help or supervision to perform IADLs (using the telephone, shopping, preparing meals, housekeeping, doing laundry, using transportation, managing medications and managing finances) [Designated Healthcare Proxy] : Designated healthcare proxy [MammogramComments] : due now but wants to hold off  [BoneDensityComments] : utd [PapSmearComments] : no longer [ColonoscopyDate] : 01/13 [ColonoscopyComments] : was told didn't need for 10 years [AdvancecareDate] : 09/21

## 2021-09-26 NOTE — HISTORY OF PRESENT ILLNESS
[FreeTextEntry1] : physical [de-identified] : 76 yo female with h/o as below here for CPE.\par Last year had upper abdominal discomfort, saw GI, had EGD showing gastritis with prepyloric erosion and duodenitis, advised PPI, took for a while and then symptoms resolved, hasn't had symptoms since then.  Off PPI.\par No other active issues, otherwise feeling well.  Golfs.

## 2021-11-24 ENCOUNTER — RX RENEWAL (OUTPATIENT)
Age: 76
End: 2021-11-24

## 2022-05-27 ENCOUNTER — RX RENEWAL (OUTPATIENT)
Age: 77
End: 2022-05-27

## 2022-10-20 ENCOUNTER — RESULT REVIEW (OUTPATIENT)
Age: 77
End: 2022-10-20

## 2022-10-20 ENCOUNTER — APPOINTMENT (OUTPATIENT)
Dept: MAMMOGRAPHY | Facility: IMAGING CENTER | Age: 77
End: 2022-10-20

## 2022-10-20 ENCOUNTER — OUTPATIENT (OUTPATIENT)
Dept: OUTPATIENT SERVICES | Facility: HOSPITAL | Age: 77
LOS: 1 days | End: 2022-10-20
Payer: MEDICARE

## 2022-10-20 ENCOUNTER — APPOINTMENT (OUTPATIENT)
Dept: RADIOLOGY | Facility: IMAGING CENTER | Age: 77
End: 2022-10-20

## 2022-10-20 DIAGNOSIS — Z00.8 ENCOUNTER FOR OTHER GENERAL EXAMINATION: ICD-10-CM

## 2022-10-20 PROCEDURE — 77067 SCR MAMMO BI INCL CAD: CPT | Mod: 26

## 2022-10-20 PROCEDURE — 77063 BREAST TOMOSYNTHESIS BI: CPT

## 2022-10-20 PROCEDURE — 77067 SCR MAMMO BI INCL CAD: CPT

## 2022-10-20 PROCEDURE — 77085 DXA BONE DENSITY AXL VRT FX: CPT

## 2022-10-20 PROCEDURE — 77063 BREAST TOMOSYNTHESIS BI: CPT | Mod: 26

## 2022-10-20 PROCEDURE — 77085 DXA BONE DENSITY AXL VRT FX: CPT | Mod: 26

## 2022-11-09 ENCOUNTER — MED ADMIN CHARGE (OUTPATIENT)
Age: 77
End: 2022-11-09

## 2022-11-09 ENCOUNTER — APPOINTMENT (OUTPATIENT)
Dept: INTERNAL MEDICINE | Facility: CLINIC | Age: 77
End: 2022-11-09

## 2022-11-09 VITALS
HEART RATE: 84 BPM | HEIGHT: 61.75 IN | SYSTOLIC BLOOD PRESSURE: 130 MMHG | DIASTOLIC BLOOD PRESSURE: 70 MMHG | WEIGHT: 127 LBS | BODY MASS INDEX: 23.37 KG/M2 | OXYGEN SATURATION: 98 %

## 2022-11-09 DIAGNOSIS — Z23 ENCOUNTER FOR IMMUNIZATION: ICD-10-CM

## 2022-11-09 PROCEDURE — G0444 DEPRESSION SCREEN ANNUAL: CPT | Mod: 59

## 2022-11-09 PROCEDURE — G0439: CPT

## 2022-11-09 PROCEDURE — G0008: CPT

## 2022-11-09 PROCEDURE — 90662 IIV NO PRSV INCREASED AG IM: CPT

## 2022-11-18 LAB
25(OH)D3 SERPL-MCNC: 49.2 NG/ML
ALBUMIN SERPL ELPH-MCNC: 4.6 G/DL
ALP BLD-CCNC: 105 U/L
ALT SERPL-CCNC: 12 U/L
ANION GAP SERPL CALC-SCNC: 13 MMOL/L
AST SERPL-CCNC: 17 U/L
BASOPHILS # BLD AUTO: 0.07 K/UL
BASOPHILS NFR BLD AUTO: 0.7 %
BILIRUB SERPL-MCNC: 0.4 MG/DL
BUN SERPL-MCNC: 14 MG/DL
CALCIUM SERPL-MCNC: 9.9 MG/DL
CHLORIDE SERPL-SCNC: 96 MMOL/L
CHOLEST SERPL-MCNC: 184 MG/DL
CO2 SERPL-SCNC: 24 MMOL/L
CREAT SERPL-MCNC: 0.72 MG/DL
EGFR: 86 ML/MIN/1.73M2
EOSINOPHIL # BLD AUTO: 0.13 K/UL
EOSINOPHIL NFR BLD AUTO: 1.3 %
ESTIMATED AVERAGE GLUCOSE: 111 MG/DL
GLUCOSE SERPL-MCNC: 93 MG/DL
HBA1C MFR BLD HPLC: 5.5 %
HCT VFR BLD CALC: 41.3 %
HDLC SERPL-MCNC: 91 MG/DL
HGB BLD-MCNC: 13.4 G/DL
IMM GRANULOCYTES NFR BLD AUTO: 0.5 %
LDLC SERPL CALC-MCNC: 81 MG/DL
LYMPHOCYTES # BLD AUTO: 1.83 K/UL
LYMPHOCYTES NFR BLD AUTO: 18.9 %
MAN DIFF?: NORMAL
MCHC RBC-ENTMCNC: 30.7 PG
MCHC RBC-ENTMCNC: 32.4 GM/DL
MCV RBC AUTO: 94.5 FL
MONOCYTES # BLD AUTO: 1 K/UL
MONOCYTES NFR BLD AUTO: 10.3 %
NEUTROPHILS # BLD AUTO: 6.6 K/UL
NEUTROPHILS NFR BLD AUTO: 68.3 %
NONHDLC SERPL-MCNC: 93 MG/DL
PLATELET # BLD AUTO: 293 K/UL
POTASSIUM SERPL-SCNC: 4.8 MMOL/L
PROT SERPL-MCNC: 7 G/DL
RBC # BLD: 4.37 M/UL
RBC # FLD: 12.1 %
SODIUM SERPL-SCNC: 133 MMOL/L
TRIGL SERPL-MCNC: 61 MG/DL
WBC # FLD AUTO: 9.68 K/UL

## 2022-12-14 DIAGNOSIS — E87.1 HYPO-OSMOLALITY AND HYPONATREMIA: ICD-10-CM

## 2022-12-14 NOTE — HISTORY OF PRESENT ILLNESS
[FreeTextEntry1] : physical [de-identified] : 76 yo female with h/o as below here for CPE.\par Believes had sciatica but resolved.\par Had mammo and dexa recently.\par No other active issues, feeling well.

## 2022-12-14 NOTE — ASSESSMENT
[FreeTextEntry1] : 76 yo female with h/o as above including osteopenia and hyperlipidemia here for CPE.\par 1.  Endo - reviewed dexa with pt, osteopenia with slightly elevated FRAX score, declining medication, c/w calcium/vitamin D supplements, check vitamin D level; check A1c for elevated glucose prior labs\par 2.  Gyn - no further paps needed for age, mammo utd (recently done, nl)\par 3.  GI - due for colonoscopy, referral to GI given and fobi given\par 4.  CV - bp at goal, check lipids\par 5.  HCM - check other below labs; flu shot today; to get tdap and shingrix at pharmacy, other vaccines utd\par 6.  RTO prn or 1 year

## 2022-12-14 NOTE — HEALTH RISK ASSESSMENT
[Patient reported mammogram was normal] : Patient reported mammogram was normal [Patient reported bone density results were normal] : Patient reported bone density results were normal [Patient reported colonoscopy was normal] : Patient reported colonoscopy was normal [Fully functional (bathing, dressing, toileting, transferring, walking, feeding)] : Fully functional (bathing, dressing, toileting, transferring, walking, feeding) [Fully functional (using the telephone, shopping, preparing meals, housekeeping, doing laundry, using] : Fully functional and needs no help or supervision to perform IADLs (using the telephone, shopping, preparing meals, housekeeping, doing laundry, using transportation, managing medications and managing finances) [Designated Healthcare Proxy] : Designated healthcare proxy [No falls in past year] : Patient reported no falls in the past year [Relationship: ___] : Relationship: [unfilled] [MammogramDate] : 10/22 [PapSmearComments] : no longer [BoneDensityDate] : 10/22 [BoneDensityComments] : osteopenia [ColonoscopyComments] : was told didn't need for 10 years [ColonoscopyDate] : 01/13 [AdvancecareDate] : 11/22

## 2022-12-14 NOTE — PHYSICAL EXAM
[No Acute Distress] : no acute distress [Well Nourished] : well nourished [Well Developed] : well developed [Well-Appearing] : well-appearing [PERRL] : pupils equal round and reactive to light [EOMI] : extraocular movements intact [Normal Oropharynx] : the oropharynx was normal [Normal TMs] : both tympanic membranes were normal [No Lymphadenopathy] : no lymphadenopathy [Supple] : supple [Thyroid Normal, No Nodules] : the thyroid was normal and there were no nodules present [No Respiratory Distress] : no respiratory distress  [No Accessory Muscle Use] : no accessory muscle use [Clear to Auscultation] : lungs were clear to auscultation bilaterally [Normal Rate] : normal rate  [Regular Rhythm] : with a regular rhythm [Normal S1, S2] : normal S1 and S2 [No Murmur] : no murmur heard [No Carotid Bruits] : no carotid bruits [Pedal Pulses Present] : the pedal pulses are present [No Edema] : there was no peripheral edema [Soft] : abdomen soft [Non Tender] : non-tender [Non-distended] : non-distended [No Masses] : no abdominal mass palpated [No HSM] : no HSM [Normal Bowel Sounds] : normal bowel sounds [Normal Supraclavicular Nodes] : no supraclavicular lymphadenopathy [Normal Posterior Cervical Nodes] : no posterior cervical lymphadenopathy [Normal Anterior Cervical Nodes] : no anterior cervical lymphadenopathy [Normal Inguinal Nodes] : no inguinal lymphadenopathy [No Joint Swelling] : no joint swelling [No Rash] : no rash [Normal Gait] : normal gait [Normal Affect] : the affect was normal [de-identified] : slightly generally firm/muscular but no discrete masses

## 2022-12-14 NOTE — ADDENDUM
[FreeTextEntry1] : 11/18/22:  Reviewed labs, nl except Na 133 slightly low, has been low before, drinks a lot of water and avoiding salt so advised maybe to take slightly less water and more salt to balance but low suspicion for underlying abnl, other labs nl.\par 12/14/22: Pt called, would like to go back to regular diet and regular fluid intake, will recheck bmp for Na level in a few weeks.

## 2022-12-14 NOTE — REVIEW OF SYSTEMS
[Negative] : Integumentary [Fever] : no fever [Chills] : no chills [Fatigue] : no fatigue [Recent Change In Weight] : ~T no recent weight change [Chest Pain] : no chest pain [Palpitations] : no palpitations [Shortness Of Breath] : no shortness of breath [Cough] : no cough [Abdominal Pain] : no abdominal pain [Nausea] : no nausea [Constipation] : no constipation [Diarrhea] : diarrhea [Vomiting] : no vomiting [Heartburn] : no heartburn [Melena] : no melena [Dysuria] : no dysuria [Vaginal Discharge] : no vaginal discharge [Skin Rash] : no skin rash [Headache] : no headache [Dizziness] : no dizziness [Fainting] : no fainting [Anxiety] : no anxiety [Depression] : no depression [Easy Bleeding] : no easy bleeding [Easy Bruising] : no easy bruising [FreeTextEntry2] : diet is good, exercises (walks)

## 2022-12-19 ENCOUNTER — RX RENEWAL (OUTPATIENT)
Age: 77
End: 2022-12-19

## 2023-01-24 ENCOUNTER — APPOINTMENT (OUTPATIENT)
Dept: GASTROENTEROLOGY | Facility: CLINIC | Age: 78
End: 2023-01-24

## 2023-07-11 ENCOUNTER — RX RENEWAL (OUTPATIENT)
Age: 78
End: 2023-07-11

## 2023-11-15 ENCOUNTER — APPOINTMENT (OUTPATIENT)
Dept: INTERNAL MEDICINE | Facility: CLINIC | Age: 78
End: 2023-11-15
Payer: MEDICARE

## 2023-11-15 ENCOUNTER — OUTPATIENT (OUTPATIENT)
Dept: OUTPATIENT SERVICES | Facility: HOSPITAL | Age: 78
LOS: 1 days | End: 2023-11-15
Payer: MEDICARE

## 2023-11-15 VITALS
HEART RATE: 96 BPM | OXYGEN SATURATION: 98 % | SYSTOLIC BLOOD PRESSURE: 138 MMHG | HEIGHT: 61.75 IN | WEIGHT: 125 LBS | BODY MASS INDEX: 23 KG/M2 | DIASTOLIC BLOOD PRESSURE: 72 MMHG

## 2023-11-15 VITALS — DIASTOLIC BLOOD PRESSURE: 76 MMHG | SYSTOLIC BLOOD PRESSURE: 120 MMHG

## 2023-11-15 DIAGNOSIS — E78.5 HYPERLIPIDEMIA, UNSPECIFIED: ICD-10-CM

## 2023-11-15 DIAGNOSIS — I10 ESSENTIAL (PRIMARY) HYPERTENSION: ICD-10-CM

## 2023-11-15 DIAGNOSIS — M85.80 OTHER SPECIFIED DISORDERS OF BONE DENSITY AND STRUCTURE, UNSPECIFIED SITE: ICD-10-CM

## 2023-11-15 PROCEDURE — G0439: CPT

## 2023-11-15 PROCEDURE — 90662 IIV NO PRSV INCREASED AG IM: CPT

## 2023-11-15 PROCEDURE — G0008: CPT

## 2023-11-15 RX ORDER — CRANBERRY FRUIT EXTRACT 200 MG
CAPSULE ORAL
Refills: 0 | Status: DISCONTINUED | COMMUNITY
End: 2023-11-15

## 2023-11-15 RX ORDER — PSYLLIUM HUSK 0.4 G
CAPSULE ORAL
Refills: 0 | Status: DISCONTINUED | COMMUNITY
End: 2023-11-15

## 2023-11-18 PROBLEM — E78.5 HYPERLIPIDEMIA: Status: ACTIVE | Noted: 2017-05-08

## 2023-11-21 ENCOUNTER — TRANSCRIPTION ENCOUNTER (OUTPATIENT)
Age: 78
End: 2023-11-21

## 2023-11-21 LAB
25(OH)D3 SERPL-MCNC: 50.7 NG/ML
ALBUMIN SERPL ELPH-MCNC: 4.7 G/DL
ALP BLD-CCNC: 105 U/L
ALT SERPL-CCNC: 13 U/L
ANION GAP SERPL CALC-SCNC: 13 MMOL/L
AST SERPL-CCNC: 22 U/L
BILIRUB SERPL-MCNC: 0.4 MG/DL
BUN SERPL-MCNC: 14 MG/DL
CALCIUM SERPL-MCNC: 10.2 MG/DL
CHLORIDE SERPL-SCNC: 98 MMOL/L
CHOLEST SERPL-MCNC: 179 MG/DL
CO2 SERPL-SCNC: 23 MMOL/L
CREAT SERPL-MCNC: 0.71 MG/DL
EGFR: 87 ML/MIN/1.73M2
GLUCOSE SERPL-MCNC: 100 MG/DL
HCT VFR BLD CALC: 41.1 %
HDLC SERPL-MCNC: 94 MG/DL
HGB BLD-MCNC: 13.3 G/DL
LDLC SERPL CALC-MCNC: 74 MG/DL
MCHC RBC-ENTMCNC: 30.4 PG
MCHC RBC-ENTMCNC: 32.4 GM/DL
MCV RBC AUTO: 94.1 FL
NONHDLC SERPL-MCNC: 86 MG/DL
PLATELET # BLD AUTO: 257 K/UL
POTASSIUM SERPL-SCNC: 5.3 MMOL/L
PROT SERPL-MCNC: 7.4 G/DL
RBC # BLD: 4.37 M/UL
RBC # FLD: 12.4 %
SODIUM SERPL-SCNC: 134 MMOL/L
TRIGL SERPL-MCNC: 59 MG/DL
TSH SERPL-ACNC: 1.65 UIU/ML
WBC # FLD AUTO: 7.48 K/UL

## 2023-11-25 ENCOUNTER — TRANSCRIPTION ENCOUNTER (OUTPATIENT)
Age: 78
End: 2023-11-25

## 2023-11-25 LAB — HEMOCCULT STL QL IA: NEGATIVE

## 2023-11-27 DIAGNOSIS — M85.80 OTHER SPECIFIED DISORDERS OF BONE DENSITY AND STRUCTURE, UNSPECIFIED SITE: ICD-10-CM

## 2023-11-27 DIAGNOSIS — Z00.00 ENCOUNTER FOR GENERAL ADULT MEDICAL EXAMINATION WITHOUT ABNORMAL FINDINGS: ICD-10-CM

## 2023-11-27 DIAGNOSIS — Z23 ENCOUNTER FOR IMMUNIZATION: ICD-10-CM

## 2023-11-27 DIAGNOSIS — R94.6 ABNORMAL RESULTS OF THYROID FUNCTION STUDIES: ICD-10-CM

## 2023-11-27 DIAGNOSIS — E78.5 HYPERLIPIDEMIA, UNSPECIFIED: ICD-10-CM

## 2023-11-30 ENCOUNTER — APPOINTMENT (OUTPATIENT)
Dept: ULTRASOUND IMAGING | Facility: CLINIC | Age: 78
End: 2023-11-30
Payer: MEDICARE

## 2023-11-30 ENCOUNTER — RESULT REVIEW (OUTPATIENT)
Age: 78
End: 2023-11-30

## 2023-11-30 ENCOUNTER — APPOINTMENT (OUTPATIENT)
Dept: MAMMOGRAPHY | Facility: CLINIC | Age: 78
End: 2023-11-30
Payer: MEDICARE

## 2023-11-30 PROCEDURE — 77067 SCR MAMMO BI INCL CAD: CPT

## 2023-11-30 PROCEDURE — 77063 BREAST TOMOSYNTHESIS BI: CPT

## 2023-11-30 PROCEDURE — 76536 US EXAM OF HEAD AND NECK: CPT

## 2024-01-12 ENCOUNTER — RESULT REVIEW (OUTPATIENT)
Age: 79
End: 2024-01-12

## 2024-01-18 ENCOUNTER — OUTPATIENT (OUTPATIENT)
Dept: OUTPATIENT SERVICES | Facility: HOSPITAL | Age: 79
LOS: 1 days | End: 2024-01-18
Payer: MEDICARE

## 2024-01-18 ENCOUNTER — APPOINTMENT (OUTPATIENT)
Dept: ULTRASOUND IMAGING | Facility: IMAGING CENTER | Age: 79
End: 2024-01-18
Payer: MEDICARE

## 2024-01-18 ENCOUNTER — RESULT REVIEW (OUTPATIENT)
Age: 79
End: 2024-01-18

## 2024-01-18 DIAGNOSIS — R94.6 ABNORMAL RESULTS OF THYROID FUNCTION STUDIES: ICD-10-CM

## 2024-01-18 PROCEDURE — 88172 CYTP DX EVAL FNA 1ST EA SITE: CPT

## 2024-01-18 PROCEDURE — 81445 SO NEO GSAP 5-50DNA/DNA&RNA: CPT

## 2024-01-18 PROCEDURE — 88173 CYTOPATH EVAL FNA REPORT: CPT

## 2024-01-18 PROCEDURE — 10005 FNA BX W/US GDN 1ST LES: CPT

## 2024-01-18 PROCEDURE — 88173 CYTOPATH EVAL FNA REPORT: CPT | Mod: 26

## 2024-01-22 NOTE — REVIEW OF SYSTEMS
[Fever] : no fever [Chills] : no chills [Fatigue] : no fatigue [Palpitations] : no palpitations [Chest Pain] : no chest pain [Recent Change In Weight] : ~T no recent weight change [Shortness Of Breath] : no shortness of breath [Cough] : no cough [Dyspnea on Exertion] : no dyspnea on exertion [Abdominal Pain] : no abdominal pain [Constipation] : no constipation [Nausea] : no nausea [Vomiting] : no vomiting [Diarrhea] : diarrhea [Melena] : no melena [Heartburn] : no heartburn [Vaginal Discharge] : no vaginal discharge [Dysuria] : no dysuria [Skin Rash] : no skin rash [Headache] : no headache [Dizziness] : no dizziness [Anxiety] : no anxiety [Fainting] : no fainting [Easy Bleeding] : no easy bleeding [Easy Bruising] : no easy bruising [Depression] : no depression [FreeTextEntry2] : diet good, exercises [FreeTextEntry3] : sees optho, will go back

## 2024-01-22 NOTE — PHYSICAL EXAM
[de-identified] : well-demarcated left side of tongue area 1 cm of absence of papilla but no discoloration (notes bites tongue) [de-identified] : ?slight prominence right side of thyroid compared to left side, prominent crichoid cartilage [de-identified] : generalized firmness upper abdomen but no discrete masses

## 2024-01-22 NOTE — ASSESSMENT
[FreeTextEntry1] : 78 y female with h/o as above including hyperlipidemia and osteopenia here for CPE. 1.  CV - bp at goal, check lipids 2.  Endo - mildly prominent/asymmetric thyroid, check thyroid US and tsh; check vitamin D, dexa  utd 3. Gyn - no further paps for age, rx mammo given as due 4.  GI - fobi given as due (doesn't want to do full colonoscopy right now) 5.  HCM - check below labs; flu shot today, consider covid booster, tdap, shingrix, rsv 6.  RTO prn or 1 year

## 2024-01-22 NOTE — HEALTH RISK ASSESSMENT
[PapSmearComments] : no longer [MammogramDate] : 10/22 [ColonoscopyDate] : 01/13 [BoneDensityComments] : osteopenia [BoneDensityDate] : 10/22 [ColonoscopyComments] : was told didn't need for 10 years [AdvancecareDate] : 11/23

## 2024-01-22 NOTE — ADDENDUM
[FreeTextEntry1] : 11/21/23:  Reviewed labs, nl except Na 134 minimally low but chronic, other labs nl - mailed/messaged to pt. 11/25/23:  FOBI negative -mailed/messaged to pt. 1/12/24:  Reviewed mammo and thyroid US with pt, mammo birads 1, thyroid us isthmus nodule, f/up 1 year but isthmus nodule tirad 4 and > 1.5 cm so will proceed with FNA. 1/22/24:  Thyroid nodule biopsy showed atypia of undetermined significance (genetic panel pending) - d/w pt, referred to Dr. Osmin León for eval/consultation.

## 2024-01-22 NOTE — HISTORY OF PRESENT ILLNESS
[de-identified] : 77 yo female with h/o as below here for CPE. No active issues, feeling well.   [FreeTextEntry1] : physical

## 2024-01-30 ENCOUNTER — APPOINTMENT (OUTPATIENT)
Dept: SURGERY | Facility: CLINIC | Age: 79
End: 2024-01-30
Payer: MEDICARE

## 2024-01-30 ENCOUNTER — LABORATORY RESULT (OUTPATIENT)
Age: 79
End: 2024-01-30

## 2024-01-30 VITALS
HEIGHT: 62 IN | DIASTOLIC BLOOD PRESSURE: 79 MMHG | BODY MASS INDEX: 22.82 KG/M2 | HEART RATE: 87 BPM | SYSTOLIC BLOOD PRESSURE: 122 MMHG | WEIGHT: 124 LBS

## 2024-01-30 DIAGNOSIS — R94.6 ABNORMAL RESULTS OF THYROID FUNCTION STUDIES: ICD-10-CM

## 2024-01-30 PROCEDURE — 10005 FNA BX W/US GDN 1ST LES: CPT

## 2024-01-30 PROCEDURE — 36415 COLL VENOUS BLD VENIPUNCTURE: CPT

## 2024-01-30 PROCEDURE — 99204 OFFICE O/P NEW MOD 45 MIN: CPT | Mod: 25

## 2024-01-30 NOTE — ASSESSMENT
[FreeTextEntry1] : sonogram guided fine needle aspiration cytology isthmus nodule performed. specimen sent for thyroseq. to call next week for results. patient has been given the opportunity to ask questions, and all of the patient's questions have been answered to their satisfaction

## 2024-01-30 NOTE — HISTORY OF PRESENT ILLNESS
[de-identified] : Pt c/o Thyroid nodule.  denies dysphagia, hoarseness, SOB or RT exposure sonogram:  Right 1.8 cm, Left 9 mm and isthmus 1.9 cm FNA Isthmus:  AUS, no molecular studies performed normal TSH. calcium 10.2 I have reviewed all old and new data and available images.

## 2024-01-30 NOTE — CONSULT LETTER
[Dear  ___] : Dear  [unfilled], [Consult Letter:] : I had the pleasure of evaluating your patient, [unfilled]. [Please see my note below.] : Please see my note below. [Consult Closing:] : Thank you very much for allowing me to participate in the care of this patient.  If you have any questions, please do not hesitate to contact me. [Sincerely,] : Sincerely, [FreeTextEntry2] : Dr. Ana Laura Johnston [FreeTextEntry3] : Osmin León MD, FACS System Director, Endocrine Surgery [facility] Associate  Professor of Surgery [employer] [facility]

## 2024-01-30 NOTE — PHYSICAL EXAM
[de-identified] : 3 cm right thyroid isthmus nodule, well circumscribed and mobile [Laryngoscopy Performed] : laryngoscopy was performed, see procedure section for findings [Midline] : located in midline position [Normal] : orientation to person, place, and time: normal [de-identified] : indirect  laryngoscopy shows normal vocal cord mobility bilaterally with no lesions noted

## 2024-01-31 LAB
CALCIUM SERPL-MCNC: 9.6 MG/DL
CALCIUM SERPL-MCNC: 9.6 MG/DL
PARATHYROID HORMONE INTACT: 33 PG/ML
T3 SERPL-MCNC: 102 NG/DL
T4 FREE SERPL-MCNC: 1.6 NG/DL
THYROGLOB AB SERPL-ACNC: <20 IU/ML
THYROPEROXIDASE AB SERPL IA-ACNC: <10 IU/ML
TSH SERPL-ACNC: 1.67 UIU/ML

## 2024-05-08 ENCOUNTER — OUTPATIENT (OUTPATIENT)
Dept: OUTPATIENT SERVICES | Facility: HOSPITAL | Age: 79
LOS: 1 days | End: 2024-05-08
Payer: MEDICARE

## 2024-05-08 ENCOUNTER — RESULT REVIEW (OUTPATIENT)
Age: 79
End: 2024-05-08

## 2024-05-08 ENCOUNTER — APPOINTMENT (OUTPATIENT)
Dept: INTERNAL MEDICINE | Facility: CLINIC | Age: 79
End: 2024-05-08
Payer: MEDICARE

## 2024-05-08 VITALS
HEART RATE: 100 BPM | HEIGHT: 62 IN | BODY MASS INDEX: 21.9 KG/M2 | OXYGEN SATURATION: 98 % | SYSTOLIC BLOOD PRESSURE: 122 MMHG | WEIGHT: 119 LBS | DIASTOLIC BLOOD PRESSURE: 80 MMHG

## 2024-05-08 DIAGNOSIS — K29.90 GASTRODUODENITIS, UNSPECIFIED, WITHOUT BLEEDING: ICD-10-CM

## 2024-05-08 DIAGNOSIS — I10 ESSENTIAL (PRIMARY) HYPERTENSION: ICD-10-CM

## 2024-05-08 DIAGNOSIS — R10.10 UPPER ABDOMINAL PAIN, UNSPECIFIED: ICD-10-CM

## 2024-05-08 DIAGNOSIS — K29.90 GASTRODUODENITIS, UNSPECIFIED, W/OUT BLEEDING: ICD-10-CM

## 2024-05-08 DIAGNOSIS — Z00.00 ENCOUNTER FOR GENERAL ADULT MEDICAL EXAMINATION W/OUT ABNORMAL FINDINGS: ICD-10-CM

## 2024-05-08 PROCEDURE — G0463: CPT

## 2024-05-08 PROCEDURE — 99214 OFFICE O/P EST MOD 30 MIN: CPT | Mod: GC

## 2024-05-08 RX ORDER — OMEPRAZOLE 20 MG/1
20 TABLET, DELAYED RELEASE ORAL TWICE DAILY
Qty: 180 | Refills: 1 | Status: ACTIVE | COMMUNITY
Start: 2024-05-08 | End: 1900-01-01

## 2024-05-14 ENCOUNTER — OUTPATIENT (OUTPATIENT)
Dept: OUTPATIENT SERVICES | Facility: HOSPITAL | Age: 79
LOS: 1 days | End: 2024-05-14
Payer: MEDICARE

## 2024-05-14 ENCOUNTER — APPOINTMENT (OUTPATIENT)
Dept: ULTRASOUND IMAGING | Facility: CLINIC | Age: 79
End: 2024-05-14
Payer: MEDICARE

## 2024-05-14 DIAGNOSIS — K29.90 GASTRODUODENITIS, UNSPECIFIED, WITHOUT BLEEDING: ICD-10-CM

## 2024-05-14 PROCEDURE — 76700 US EXAM ABDOM COMPLETE: CPT

## 2024-05-14 PROCEDURE — 76700 US EXAM ABDOM COMPLETE: CPT | Mod: 26

## 2024-05-14 PROCEDURE — 76775 US EXAM ABDO BACK WALL LIM: CPT | Mod: 26,XU

## 2024-05-14 PROCEDURE — 76775 US EXAM ABDO BACK WALL LIM: CPT

## 2024-05-28 ENCOUNTER — NON-APPOINTMENT (OUTPATIENT)
Age: 79
End: 2024-05-28

## 2024-05-28 NOTE — REVIEW OF SYSTEMS
[Negative] : Neurological [Abdominal Pain] : abdominal pain [Nausea] : no nausea [Constipation] : no constipation [Diarrhea] : diarrhea [Vomiting] : no vomiting [Melena] : no melena

## 2024-05-28 NOTE — ASSESSMENT
[FreeTextEntry1] : 78F with hx HLD, osteopenia, benign thyroid nodule presents for APA complaining of 1 week of abdominal pain.  #Abdominal pain P/w 1 week of abdominal pain which she has had intermittently for past 6-7 years. Previously had abdominal ultrasound 2020 which was unremarkable and EGD 2021 showing gastritis and duodenitis, biopsy negative for H. pylori. She was recommended to take Omeprazole which provided relief however has not taken in years. Suspect sx iso gastritis from recent dietary changes however PUD also on differential. Blood work from 2023 is predominantly unremarkable. Low suspicion for etiology such as malignancy given prior negative work-up however important to note 6lb weight loss in 6 months. -will start Omeprazole 20mg BID -recommended dietary changes including reducing intake tea and spicy foods, increase intake bland foods -f/u US abd complete. If sx persist or worsen can consider CTAP  -GI referral sent  #HCM Incidental bounding aorta on exam, given hx of smoking will assess with ultrasound.  -f/u US aorta  -RTC in 1 month for f/u. If sx persist or worsen can return in 2 weeks

## 2024-05-28 NOTE — HISTORY OF PRESENT ILLNESS
[FreeTextEntry8] : 78F with hx HLD, osteopenia, benign thyroid nodule presents for APA complaining of 1 week of abdominal pain. She states pain began one day after attending a party in which she had 3 glasses of wine and foods that were less bland than usual diet. Pain is described as achy, located in center/epigastric area of abdomen, and does not radiate. Pain has no exacerbating factors and improves with eating. The pain has been nearly constant for the past week but does not prevent her from completing usual activities. She reports having similar pain intermittently for the past 6-7 years with no obvious triggers, usually occurs once per year but only lasts 2-3 days. Also had 1-2 episodes of loose stools which have improved. Stools now formed, brown, and occurring once per day. Otherwise she reports no associated sx including weight loss, fevers, chills, chest pain, dyspnea, nausea, vomiting, dysuria, or other urinary sx.   She is a former smoker with 5 pack year hx, quit 50 years ago. No recreational drug use. She will usually have 1 glass of wine with dinner once or twice per week. She reports having 3-4 cups of Shea tea daily. Three weeks ago she visited [facility] but did not have any new or unusual foods while there. No sick contacts at home. No NSAID/ASA use.

## 2024-05-28 NOTE — PHYSICAL EXAM
[No Acute Distress] : no acute distress [Well Nourished] : well nourished [Well Developed] : well developed [Well-Appearing] : well-appearing [Normal Sclera/Conjunctiva] : normal sclera/conjunctiva [PERRL] : pupils equal round and reactive to light [EOMI] : extraocular movements intact [Normal Outer Ear/Nose] : the outer ears and nose were normal in appearance [Normal Oropharynx] : the oropharynx was normal [No Lymphadenopathy] : no lymphadenopathy [Supple] : supple [No Respiratory Distress] : no respiratory distress  [No Accessory Muscle Use] : no accessory muscle use [Clear to Auscultation] : lungs were clear to auscultation bilaterally [Normal Rate] : normal rate  [Regular Rhythm] : with a regular rhythm [Normal S1, S2] : normal S1 and S2 [No Murmur] : no murmur heard [No Varicosities] : no varicosities [No Edema] : there was no peripheral edema [Soft] : abdomen soft [Non Tender] : non-tender [Non-distended] : non-distended [Normal Bowel Sounds] : normal bowel sounds [No Joint Swelling] : no joint swelling [Grossly Normal Strength/Tone] : grossly normal strength/tone [No Rash] : no rash [Coordination Grossly Intact] : coordination grossly intact [No Focal Deficits] : no focal deficits [Normal Gait] : normal gait [Normal Affect] : the affect was normal [Normal Insight/Judgement] : insight and judgment were intact [de-identified] : bounding aorta [de-identified] : firmness of over RUQ and epigastrum

## 2024-05-28 NOTE — END OF VISIT
[] : Resident [FreeTextEntry3] : Hx as outline with recurring RUQ tenderness on exam, no rebound and palapble aorta ni mid epigastrum BMI 21, anicteric, eating well, no signs of SBo, , plan as outline to f/u RUG US, no urinary symptoms, no constitutionl symtoms but due forscreening colonsocop with GI referral iven F/u prn if sxs worsen including N/V, fever, chills, and f/u wiht PCP planned [Time Spent: ___ minutes] : I have spent [unfilled] minutes of time on the encounter.

## 2024-05-30 DIAGNOSIS — Z87.898 PERSONAL HISTORY OF OTHER SPECIFIED CONDITIONS: ICD-10-CM

## 2024-06-05 ENCOUNTER — APPOINTMENT (OUTPATIENT)
Dept: OTOLARYNGOLOGY | Facility: CLINIC | Age: 79
End: 2024-06-05
Payer: MEDICARE

## 2024-06-05 VITALS
BODY MASS INDEX: 22.08 KG/M2 | WEIGHT: 120 LBS | RESPIRATION RATE: 17 BRPM | HEART RATE: 92 BPM | DIASTOLIC BLOOD PRESSURE: 76 MMHG | HEIGHT: 62 IN | OXYGEN SATURATION: 97 % | SYSTOLIC BLOOD PRESSURE: 122 MMHG

## 2024-06-05 DIAGNOSIS — H93.293 OTHER ABNORMAL AUDITORY PERCEPTIONS, BILATERAL: ICD-10-CM

## 2024-06-05 DIAGNOSIS — H93.12 TINNITUS, LEFT EAR: ICD-10-CM

## 2024-06-05 DIAGNOSIS — H90.3 SENSORINEURAL HEARING LOSS, BILATERAL: ICD-10-CM

## 2024-06-05 PROCEDURE — 92625 TINNITUS ASSESSMENT: CPT

## 2024-06-05 PROCEDURE — 92567 TYMPANOMETRY: CPT

## 2024-06-05 PROCEDURE — 99203 OFFICE O/P NEW LOW 30 MIN: CPT

## 2024-06-05 PROCEDURE — 92557 COMPREHENSIVE HEARING TEST: CPT

## 2024-06-05 RX ORDER — CHROMIUM 200 MCG
TABLET ORAL
Refills: 0 | Status: ACTIVE | COMMUNITY

## 2024-06-05 RX ORDER — ASCORBIC ACID 500 MG
TABLET ORAL
Refills: 0 | Status: ACTIVE | COMMUNITY

## 2024-06-10 ENCOUNTER — TRANSCRIPTION ENCOUNTER (OUTPATIENT)
Age: 79
End: 2024-06-10

## 2024-06-10 PROBLEM — Z87.898 HISTORY OF HEADACHE: Status: RESOLVED | Noted: 2021-03-25 | Resolved: 2021-09-24

## 2024-06-12 ENCOUNTER — TRANSCRIPTION ENCOUNTER (OUTPATIENT)
Age: 79
End: 2024-06-12

## 2024-06-13 ENCOUNTER — APPOINTMENT (OUTPATIENT)
Dept: INTERNAL MEDICINE | Facility: CLINIC | Age: 79
End: 2024-06-13

## 2024-06-17 ENCOUNTER — RX RENEWAL (OUTPATIENT)
Age: 79
End: 2024-06-17

## 2024-06-17 RX ORDER — ROSUVASTATIN CALCIUM 5 MG/1
5 TABLET, FILM COATED ORAL
Qty: 90 | Refills: 3 | Status: ACTIVE | COMMUNITY
Start: 2020-09-30 | End: 1900-01-01

## 2024-06-18 ENCOUNTER — APPOINTMENT (OUTPATIENT)
Dept: CT IMAGING | Facility: CLINIC | Age: 79
End: 2024-06-18
Payer: MEDICARE

## 2024-06-18 ENCOUNTER — OUTPATIENT (OUTPATIENT)
Dept: OUTPATIENT SERVICES | Facility: HOSPITAL | Age: 79
LOS: 1 days | End: 2024-06-18
Payer: MEDICARE

## 2024-06-18 DIAGNOSIS — Z87.898 PERSONAL HISTORY OF OTHER SPECIFIED CONDITIONS: ICD-10-CM

## 2024-06-18 PROCEDURE — 70450 CT HEAD/BRAIN W/O DYE: CPT

## 2024-06-18 PROCEDURE — 70450 CT HEAD/BRAIN W/O DYE: CPT | Mod: 26

## 2024-06-20 PROBLEM — H93.293 OTHER ABNORMAL AUDITORY PERCEPTIONS, BILATERAL: Status: ACTIVE | Noted: 2024-06-20

## 2024-06-20 NOTE — DATA REVIEWED
[de-identified] : An audiogram was ordered and performed including pure tones, tympanometry and speech testing for the patients complaint of hearing loss I have independently reviewed the patient's audiogram from today and my findings include  AU Hearing -2k hz sloping to mild to severe SNHL through 8k hz. AD CNS. AS Tymp B. Pitch Match 4k hz @35dB

## 2024-06-20 NOTE — HISTORY OF PRESENT ILLNESS
[de-identified] : 78 year old female presents with Tinnitus L>R for many years but increasingly louder for the past 6 months.  History of HLD and Former Smoker  States tinnitus is constant, not bothersome- able to sleep through out the night  States ringing radiates to the back of her head.  Denies pulsatile tinnitus.  Patient denies otalgia, otorrhea, recent ear infections, ear surgeries, hearing loss, tinnitus, dizziness, vertigo, headaches related to hearing.

## 2024-06-20 NOTE — ASSESSMENT
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 Physical exam shows bilateral  ears normal EAC/TM   Personally reviewed Audiogram shows AU Hearing -2k hz sloping to mild to severe SNHL through 8k hz. AD CNS. AS Tymp B. Pitch Match 4k hz @35dB   Recommend SNHL -Discussed Benefit of Hearings Aids and their value of helping keep brain stimulated by helping hear conversation which keeps a person active and socially connected. Stressed also the association with a lower risk of incident dementia and slower cognitive decline. -[facility] Hearing Aid Evaluation and List of hearing aid Dispensary Given  Left Unilateral Tinnitus -Discussed that Tinnitus usually can be attributed to phantom sounds in the brain filling in for sounds. If the tinnitus is brief only last for a few seconds with no loss of hearing associated. It is usually physiological and can be management conservatively -Discussed notched therapy, masking therapy, cognitive behavioral therapy, factors that may influence tinnitus, and discussed limited benefit of tinnitus supplements. -Discussed extensively that unilateral tinnitus can be associate many etiologies one of which may be a small lesions within the nerve of hearing. An MRI Brain/IAC can usually puck up these lesion. Majority of the time even if there is a lesion we would monitor the lesion with serial MRI to follow its growth. -Patient states would NOT pursue the MRI Brain/IAC at this time and would rather monitor hearing and tinnitus and if symptoms change or audiogram change she would then like to do MRI -Patient understand the risks of this course and understands the plan. -Patient states will try white noise machine  -Return to clinic in 3 months or sooner if new/worsen symptoms present

## 2024-06-24 ENCOUNTER — TRANSCRIPTION ENCOUNTER (OUTPATIENT)
Age: 79
End: 2024-06-24

## 2024-08-06 ENCOUNTER — APPOINTMENT (OUTPATIENT)
Dept: SURGERY | Facility: CLINIC | Age: 79
End: 2024-08-06

## 2024-08-12 DIAGNOSIS — R51.9 HEADACHE, UNSPECIFIED: ICD-10-CM

## 2024-08-13 ENCOUNTER — APPOINTMENT (OUTPATIENT)
Dept: INTERNAL MEDICINE | Facility: CLINIC | Age: 79
End: 2024-08-13
Payer: MEDICARE

## 2024-08-13 ENCOUNTER — APPOINTMENT (OUTPATIENT)
Dept: MRI IMAGING | Facility: CLINIC | Age: 79
End: 2024-08-13

## 2024-08-13 ENCOUNTER — APPOINTMENT (OUTPATIENT)
Dept: GASTROENTEROLOGY | Facility: CLINIC | Age: 79
End: 2024-08-13

## 2024-08-13 ENCOUNTER — NON-APPOINTMENT (OUTPATIENT)
Age: 79
End: 2024-08-13

## 2024-08-13 VITALS
WEIGHT: 121 LBS | HEIGHT: 62 IN | HEART RATE: 87 BPM | BODY MASS INDEX: 22.26 KG/M2 | SYSTOLIC BLOOD PRESSURE: 114 MMHG | DIASTOLIC BLOOD PRESSURE: 70 MMHG | OXYGEN SATURATION: 98 %

## 2024-08-13 DIAGNOSIS — R55 SYNCOPE AND COLLAPSE: ICD-10-CM

## 2024-08-13 PROCEDURE — 99214 OFFICE O/P EST MOD 30 MIN: CPT

## 2024-08-14 ENCOUNTER — APPOINTMENT (OUTPATIENT)
Dept: MRI IMAGING | Facility: CLINIC | Age: 79
End: 2024-08-14

## 2024-08-14 DIAGNOSIS — E87.1 HYPO-OSMOLALITY AND HYPONATREMIA: ICD-10-CM

## 2024-08-14 LAB
ALBUMIN SERPL ELPH-MCNC: 4.6 G/DL
ALP BLD-CCNC: 110 U/L
ALT SERPL-CCNC: 13 U/L
ANION GAP SERPL CALC-SCNC: 13 MMOL/L
APPEARANCE: CLEAR
AST SERPL-CCNC: 21 U/L
BACTERIA UR CULT: NORMAL
BACTERIA: NEGATIVE /HPF
BASOPHILS # BLD AUTO: 0.04 K/UL
BASOPHILS NFR BLD AUTO: 0.5 %
BILIRUB SERPL-MCNC: 0.5 MG/DL
BILIRUBIN URINE: NEGATIVE
BLOOD URINE: NEGATIVE
BUN SERPL-MCNC: 11 MG/DL
CALCIUM SERPL-MCNC: 9.8 MG/DL
CAST: 0 /LPF
CHLORIDE SERPL-SCNC: 95 MMOL/L
CHOLEST SERPL-MCNC: 186 MG/DL
CO2 SERPL-SCNC: 24 MMOL/L
COLOR: YELLOW
CREAT SERPL-MCNC: 0.69 MG/DL
EGFR: 89 ML/MIN/1.73M2
EOSINOPHIL # BLD AUTO: 0.08 K/UL
EOSINOPHIL NFR BLD AUTO: 1 %
EPITHELIAL CELLS: 0 /HPF
ESTIMATED AVERAGE GLUCOSE: 105 MG/DL
GLUCOSE QUALITATIVE U: NEGATIVE MG/DL
GLUCOSE SERPL-MCNC: 99 MG/DL
HBA1C MFR BLD HPLC: 5.3 %
HCT VFR BLD CALC: 42.5 %
HDLC SERPL-MCNC: 109 MG/DL
HGB BLD-MCNC: 13.5 G/DL
IMM GRANULOCYTES NFR BLD AUTO: 0.3 %
KETONES URINE: NEGATIVE MG/DL
LDLC SERPL CALC-MCNC: 67 MG/DL
LEUKOCYTE ESTERASE URINE: NEGATIVE
LYMPHOCYTES # BLD AUTO: 1.71 K/UL
LYMPHOCYTES NFR BLD AUTO: 22.1 %
MAN DIFF?: NORMAL
MCHC RBC-ENTMCNC: 30.4 PG
MCHC RBC-ENTMCNC: 31.8 GM/DL
MCV RBC AUTO: 95.7 FL
MICROSCOPIC-UA: NORMAL
MONOCYTES # BLD AUTO: 0.76 K/UL
MONOCYTES NFR BLD AUTO: 9.8 %
NEUTROPHILS # BLD AUTO: 5.13 K/UL
NEUTROPHILS NFR BLD AUTO: 66.3 %
NITRITE URINE: NEGATIVE
NONHDLC SERPL-MCNC: 77 MG/DL
PH URINE: 6.5
PLATELET # BLD AUTO: 284 K/UL
POTASSIUM SERPL-SCNC: 4.6 MMOL/L
PROT SERPL-MCNC: 7.1 G/DL
PROTEIN URINE: NEGATIVE MG/DL
RBC # BLD: 4.44 M/UL
RBC # FLD: 12.9 %
RED BLOOD CELLS URINE: 1 /HPF
SODIUM SERPL-SCNC: 131 MMOL/L
SPECIFIC GRAVITY URINE: 1.01
T4 FREE SERPL-MCNC: 1.4 NG/DL
TRIGL SERPL-MCNC: 53 MG/DL
TSH SERPL-ACNC: 1.87 UIU/ML
UROBILINOGEN URINE: 0.2 MG/DL
WBC # FLD AUTO: 7.74 K/UL
WHITE BLOOD CELLS URINE: 0 /HPF

## 2024-08-14 PROCEDURE — 70553 MRI BRAIN STEM W/O & W/DYE: CPT | Mod: 26

## 2024-08-14 RX ORDER — NORMAL SALT TABLETS 1 G/G
1 TABLET ORAL
Qty: 90 | Refills: 0 | Status: ACTIVE | COMMUNITY
Start: 2024-08-14 | End: 1900-01-01

## 2024-08-15 ENCOUNTER — APPOINTMENT (OUTPATIENT)
Dept: OTOLARYNGOLOGY | Facility: CLINIC | Age: 79
End: 2024-08-15

## 2024-08-17 ENCOUNTER — TRANSCRIPTION ENCOUNTER (OUTPATIENT)
Age: 79
End: 2024-08-17

## 2024-08-17 LAB
ANION GAP SERPL CALC-SCNC: 10 MMOL/L
BUN SERPL-MCNC: 11 MG/DL
CALCIUM SERPL-MCNC: 9.5 MG/DL
CHLORIDE SERPL-SCNC: 101 MMOL/L
CO2 SERPL-SCNC: 25 MMOL/L
CREAT SERPL-MCNC: 0.68 MG/DL
EGFR: 89 ML/MIN/1.73M2
GLUCOSE SERPL-MCNC: 96 MG/DL
POTASSIUM SERPL-SCNC: 4.8 MMOL/L
SODIUM SERPL-SCNC: 136 MMOL/L

## 2024-08-19 NOTE — PHYSICAL EXAM
[Normal] : normal sclera/conjunctiva, pupils are equal, round and reactive to light and extraocular movements are intact [Coordination Grossly Intact] : coordination grossly intact [No Focal Deficits] : no focal deficits [Normal Gait] : normal gait [de-identified] : WDWN in NAD HEENT:  unremarkable Neck:  supple, no JVD, no LN Lungs:  CTA B/L, no W/R/R Heart:  Reg rate, +S1S2, no M/R/G Abdomen:  soft, NT, ND, +BS, no masses, no HS-megaly Genital: No pubic or genital lesions noted. Ext:  no C/C/E Neuro:  no focal deficits [de-identified] : Romberg test negative

## 2024-08-19 NOTE — PHYSICAL EXAM
[Normal] : normal sclera/conjunctiva, pupils are equal, round and reactive to light and extraocular movements are intact [Coordination Grossly Intact] : coordination grossly intact [No Focal Deficits] : no focal deficits [Normal Gait] : normal gait [de-identified] : WDWN in NAD HEENT:  unremarkable Neck:  supple, no JVD, no LN Lungs:  CTA B/L, no W/R/R Heart:  Reg rate, +S1S2, no M/R/G Abdomen:  soft, NT, ND, +BS, no masses, no HS-megaly Genital: No pubic or genital lesions noted. Ext:  no C/C/E Neuro:  no focal deficits [de-identified] : Romberg test negative

## 2024-08-19 NOTE — REVIEW OF SYSTEMS
[FreeTextEntry2] : Constitutional:  no fever and no chills.  Eyes:  no discharge.  HEENT:  no earache.  Cardiovascular:  no chest pain, no palpitations and no lower extremity edema.  Respiratory:  no shortness of breath, no wheezing and no cough.  Gastrointestinal:  no abdominal pain, no nausea and no vomiting.  Genitourinary:  no dysuria.  Musculoskeletal:  no joint pain.  Integumentary:  no itching.  Neurological:  no headache.  Psychiatric:  not suicidal.  Hematologic/Lymphatic:  no easy bleeding. [FreeTextEntry5] : see hpi

## 2024-08-19 NOTE — HISTORY OF PRESENT ILLNESS
[FreeTextEntry8] : Ms. BATOOL FRANCES is a 78 year old White  female  with history of  hyperlipidemia and osteopenia  presented today for fainting during the weekend.   She came alone. OTx4. She reports that she was feeling jet lag, fatigue after a long travel to [place] and returned home on 8/7/24.  8/10/24 She was at family gathering on a hot day with shaded outdoor by water. She felt sudden lightheaded then fainted.  She was sitting down when this happen and did not hit her head or fall. She was at lunch with her family who called EMS - her BP was low and blood sugar was high (she is unsure of the numbers) but was told she was probably dehydrated. She did not go see a doctor yet and has been drinking lots of oral fluid to hydrate self.  She reports mild pounding headache in left head. She made appointment or MRI brain this evening that was ordered by Dr Negrete. Denied fever, chills,CP, SOB, abdominal pain, n/v/c/d.

## 2024-08-19 NOTE — ASSESSMENT
[FreeTextEntry1] : Ms. BATOOL FRANCES is a 78 year old White  female  with history of  hyperlipidemia and osteopenia  presented today for fainting during the weekend.   # fainting Most likely vasovagal syncope from jet lag and hot weather. -VSS. no focal deficit -ECG today: Sinus Rhythm 81 bpm, -Poor R-wave progression -nonspecific -consider old anterior infarct. BORDERLINE. no previous ECG to compare.  Continue supportive care, rest, plenty of oral fluid intake.   # headache Check Brain MRI this evening.   Check CBC, CMP. f/u lab results

## 2024-08-19 NOTE — HISTORY OF PRESENT ILLNESS
[FreeTextEntry8] : Ms. BATOOL FRANCES is a 78 year old White  female  with history of  hyperlipidemia and osteopenia  presented today for fainting during the weekend.   She came alone. OTx4. She reports that she was feeling jet lag, fatigue after a long travel to [place] and returned home on 8/7/24.  8/10/24 She was at family gathering on a hot day with shaded outdoor by water. She felt sudden lightheaded then fainted.  She was sitting down when this happen and did not hit her head or fall. She was at lunch with her family who called EMS - her BP was low and blood sugar was high (she is unsure of the numbers) but was told she was probably dehydrated. She did not go see a doctor yet and has been drinking lots of oral fluid to hydrate self.  She reports mild pounding headache in left head. She made appointment or MRI brain this evening that was ordered by Dr Negrete. Denied fever, chills,CP, SOB, abdominal pain, n/v/c/d.

## 2024-08-21 ENCOUNTER — TRANSCRIPTION ENCOUNTER (OUTPATIENT)
Age: 79
End: 2024-08-21

## 2024-09-04 DIAGNOSIS — E87.1 HYPO-OSMOLALITY AND HYPONATREMIA: ICD-10-CM

## 2024-09-13 LAB
ANION GAP SERPL CALC-SCNC: 10 MMOL/L
BUN SERPL-MCNC: 12 MG/DL
CALCIUM SERPL-MCNC: 9.9 MG/DL
CHLORIDE SERPL-SCNC: 94 MMOL/L
CO2 SERPL-SCNC: 26 MMOL/L
CREAT SERPL-MCNC: 0.68 MG/DL
EGFR: 89 ML/MIN/1.73M2
GLUCOSE SERPL-MCNC: 116 MG/DL
POTASSIUM SERPL-SCNC: 5 MMOL/L
SODIUM SERPL-SCNC: 130 MMOL/L

## 2024-09-16 ENCOUNTER — APPOINTMENT (OUTPATIENT)
Dept: INTERNAL MEDICINE | Facility: CLINIC | Age: 79
End: 2024-09-16
Payer: MEDICARE

## 2024-09-16 ENCOUNTER — OUTPATIENT (OUTPATIENT)
Dept: OUTPATIENT SERVICES | Facility: HOSPITAL | Age: 79
LOS: 1 days | End: 2024-09-16
Payer: MEDICARE

## 2024-09-16 VITALS
HEIGHT: 62 IN | BODY MASS INDEX: 22.45 KG/M2 | OXYGEN SATURATION: 99 % | WEIGHT: 122 LBS | DIASTOLIC BLOOD PRESSURE: 78 MMHG | SYSTOLIC BLOOD PRESSURE: 138 MMHG | HEART RATE: 92 BPM

## 2024-09-16 DIAGNOSIS — G62.9 POLYNEUROPATHY, UNSPECIFIED: ICD-10-CM

## 2024-09-16 DIAGNOSIS — I10 ESSENTIAL (PRIMARY) HYPERTENSION: ICD-10-CM

## 2024-09-16 DIAGNOSIS — R25.2 CRAMP AND SPASM: ICD-10-CM

## 2024-09-16 PROCEDURE — G2211 COMPLEX E/M VISIT ADD ON: CPT

## 2024-09-16 PROCEDURE — 99214 OFFICE O/P EST MOD 30 MIN: CPT

## 2024-09-16 PROCEDURE — G0463: CPT

## 2024-09-16 RX ORDER — BACILLUS COAGULANS/INULIN 1B-250 MG
CAPSULE ORAL
Refills: 0 | Status: ACTIVE | COMMUNITY

## 2024-09-17 DIAGNOSIS — E87.1 HYPO-OSMOLALITY AND HYPONATREMIA: ICD-10-CM

## 2024-09-17 PROBLEM — G62.9 NEUROPATHY: Status: ACTIVE | Noted: 2024-09-17

## 2024-09-17 LAB
ANION GAP SERPL CALC-SCNC: 16 MMOL/L
BUN SERPL-MCNC: 12 MG/DL
CALCIUM SERPL-MCNC: 9.5 MG/DL
CHLORIDE ?TM UR-SCNC: 81 MMOL/L
CHLORIDE SERPL-SCNC: 91 MMOL/L
CK SERPL-CCNC: 51 U/L
CO2 SERPL-SCNC: 21 MMOL/L
CREAT SERPL-MCNC: 0.61 MG/DL
EGFR: 91 ML/MIN/1.73M2
GLUCOSE SERPL-MCNC: 95 MG/DL
MAGNESIUM SERPL-MCNC: 1.9 MG/DL
OSMOLALITY SERPL: 266 MOSMOL/KG
OSMOLALITY UR: 644 MOSM/KG
PHOSPHATE SERPL-MCNC: 3.7 MG/DL
POTASSIUM SERPL-SCNC: 4.3 MMOL/L
SODIUM ?TM SUB UR QN: 78 MMOL/L
SODIUM SERPL-SCNC: 128 MMOL/L
TSH SERPL-ACNC: 1.68 UIU/ML

## 2024-09-22 ENCOUNTER — TRANSCRIPTION ENCOUNTER (OUTPATIENT)
Age: 79
End: 2024-09-22

## 2024-09-22 PROBLEM — R25.2 MUSCLE CRAMPS: Status: ACTIVE | Noted: 2024-09-16

## 2024-09-22 LAB
ANION GAP SERPL CALC-SCNC: 12 MMOL/L
BUN SERPL-MCNC: 12 MG/DL
CALCIUM SERPL-MCNC: 9.9 MG/DL
CHLORIDE SERPL-SCNC: 101 MMOL/L
CO2 SERPL-SCNC: 24 MMOL/L
CORTIS SERPL-MCNC: 22 UG/DL
CREAT SERPL-MCNC: 0.71 MG/DL
EGFR: 87 ML/MIN/1.73M2
FOLATE SERPL-MCNC: 11.9 NG/ML
GLUCOSE SERPL-MCNC: 107 MG/DL
OSMOLALITY SERPL: 285 MOSMOL/KG
POTASSIUM SERPL-SCNC: 4.7 MMOL/L
SODIUM SERPL-SCNC: 137 MMOL/L
VIT B12 SERPL-MCNC: 1038 PG/ML

## 2024-09-22 NOTE — PHYSICAL EXAM
[No Acute Distress] : no acute distress [Well Developed] : well developed [Well Nourished] : well nourished [Well-Appearing] : well-appearing [Supple] : supple [No Respiratory Distress] : no respiratory distress  [No Accessory Muscle Use] : no accessory muscle use [Clear to Auscultation] : lungs were clear to auscultation bilaterally [Normal Rate] : normal rate  [Regular Rhythm] : with a regular rhythm [Normal S1, S2] : normal S1 and S2 [No Murmur] : no murmur heard [No Edema] : there was no peripheral edema [No Joint Swelling] : no joint swelling [No Rash] : no rash [Normal Gait] : normal gait [de-identified] : anxious affect

## 2024-09-22 NOTE — REVIEW OF SYSTEMS
[Anxiety] : anxiety [Negative] : Heme/Lymph [FreeTextEntry5] : see hpi [FreeTextEntry9] : see hpi [de-identified] : worried about her health [de-identified] : see hpi

## 2024-09-22 NOTE — REVIEW OF SYSTEMS
[Anxiety] : anxiety [Negative] : Heme/Lymph [FreeTextEntry5] : see hpi [FreeTextEntry9] : see hpi [de-identified] : worried about her health [de-identified] : see hpi

## 2024-09-22 NOTE — ADDENDUM
[FreeTextEntry1] : 9/17/24:  Reviewed labs with pt, notable for Na 128 lower than before, serum osm 266 low, otherwise nl bmp, nl magnesium/phosphorus, nl CK, nl urine osm, urine sodium 78.  Notes has been on salt tabs past 2-3 days now (previously when was on salt tabs regularly Na 136 improved), so would continue salt tabs.  Labs reflective of ?SIADH or ?reset osmostat, will try to arrange for urgent nephrology appt for input (await clinical triage from team), if SIADH, ?due to age, did add on TSH with nl results, will check morning cortisol with next labs (8-9am draw) to look for adrenal insufficiency, did have CT head done this year (and MRI brain) for posterior HA (no recent HA), if SIADH confirmed with renal and needs further testing to r/o underlying causes could also consider CT sinuses and CT chest to r/o malignancy in those respective locations, would hold on testing for temporal arteritis as denies any symptoms of temporal arteritis (notes HA posterior location).  Will bring back in 2 days for morning cortisol, recheck bmp, serum osm, also notes burning in feet after walking so will add b12/folate.  Advised c/w salt tabs for now and 1 L fluid restriction.  Will follow. 9/20/24:  Reviewed repeat labs with pt on salt tabs and 1 L fluid restriction, Na much improved (was 128, increased to 137) and serum osm 285 improved to nl, nl b12/folate, nl cortisol (not < 18 so unlikely adrenal insufficiency).  Pt awaiting renal appt next week for input on cause of hyponatremia, further instructions on salt and fluid recommendations to maintain nl sodium levels, and input on whether needs further underlying eval if does have SIADH.

## 2024-09-22 NOTE — HISTORY OF PRESENT ILLNESS
[FreeTextEntry8] : 77 yo female with h/o as below including osteopenia and hyperlipidemia here for f/up hyponatremia. Concerned because gets cramps in legs and toes/feet.  Occurs sometimes during the day, sometimes if cold, sometimes feet get hot when walking, like burning, won't be numb, not painful.  Still has good strength in legs. Last week left leg was swollen, now better, no more swelling. Very nervous that something is wrong with her health. No shortness of breath, no other swelling in legs, no further dizziness/lightheadedness.  Does drink a lot of water.  Does salt food when cooking, resumed salt tablets in last few days. Doesn't drink caffeine, drinks 2 cups of tea/day, long time.  Used to drink alcohol more, now drinks maybe 1-2 glasses of wine few times/week depending on occasion.   No further syncopal episodes but worried about it.  Doesn't feel thirsty. No other active issues.

## 2024-09-22 NOTE — PHYSICAL EXAM
[No Acute Distress] : no acute distress [Well Nourished] : well nourished [Well Developed] : well developed [Well-Appearing] : well-appearing [Supple] : supple [No Respiratory Distress] : no respiratory distress  [No Accessory Muscle Use] : no accessory muscle use [Clear to Auscultation] : lungs were clear to auscultation bilaterally [Normal Rate] : normal rate  [Regular Rhythm] : with a regular rhythm [Normal S1, S2] : normal S1 and S2 [No Murmur] : no murmur heard [No Edema] : there was no peripheral edema [No Joint Swelling] : no joint swelling [No Rash] : no rash [Normal Gait] : normal gait [de-identified] : anxious affect

## 2024-09-22 NOTE — ASSESSMENT
[FreeTextEntry1] : 77 yo female with h/o as above including hyperlipidemia, osteopenia, syncopal episode last month attributed to dehydration with subsequent labs showing hyponatremia that resolved with salt tabs, here for f/up recurrent hyponatremia off sodium tablets as well as muscle cramps in legs/feet. 1.  Renal - now back on salt tabs past few days, will check bmp again for Na level, serum/urine osm, urine sodium and chloride for further eval 2.  MSK - reported muscle cramps, check lytes and CK on statin 3.  Further plan for f/up based on results

## 2024-09-22 NOTE — PHYSICAL EXAM
[No Acute Distress] : no acute distress [Well Developed] : well developed [Well Nourished] : well nourished [Well-Appearing] : well-appearing [Supple] : supple [No Respiratory Distress] : no respiratory distress  [No Accessory Muscle Use] : no accessory muscle use [Clear to Auscultation] : lungs were clear to auscultation bilaterally [Normal Rate] : normal rate  [Regular Rhythm] : with a regular rhythm [Normal S1, S2] : normal S1 and S2 [No Murmur] : no murmur heard [No Edema] : there was no peripheral edema [No Joint Swelling] : no joint swelling [No Rash] : no rash [Normal Gait] : normal gait [de-identified] : anxious affect

## 2024-09-22 NOTE — HISTORY OF PRESENT ILLNESS
[FreeTextEntry8] : 79 yo female with h/o as below including osteopenia and hyperlipidemia here for f/up hyponatremia. Concerned because gets cramps in legs and toes/feet.  Occurs sometimes during the day, sometimes if cold, sometimes feet get hot when walking, like burning, won't be numb, not painful.  Still has good strength in legs. Last week left leg was swollen, now better, no more swelling. Very nervous that something is wrong with her health. No shortness of breath, no other swelling in legs, no further dizziness/lightheadedness.  Does drink a lot of water.  Does salt food when cooking, resumed salt tablets in last few days. Doesn't drink caffeine, drinks 2 cups of tea/day, long time.  Used to drink alcohol more, now drinks maybe 1-2 glasses of wine few times/week depending on occasion.   No further syncopal episodes but worried about it.  Doesn't feel thirsty. No other active issues.

## 2024-09-22 NOTE — REVIEW OF SYSTEMS
[Anxiety] : anxiety [Negative] : Heme/Lymph [FreeTextEntry5] : see hpi [FreeTextEntry9] : see hpi [de-identified] : see hpi [de-identified] : worried about her health

## 2024-09-22 NOTE — ASSESSMENT
[FreeTextEntry1] : 79 yo female with h/o as above including hyperlipidemia, osteopenia, syncopal episode last month attributed to dehydration with subsequent labs showing hyponatremia that resolved with salt tabs, here for f/up recurrent hyponatremia off sodium tablets as well as muscle cramps in legs/feet. 1.  Renal - now back on salt tabs past few days, will check bmp again for Na level, serum/urine osm, urine sodium and chloride for further eval 2.  MSK - reported muscle cramps, check lytes and CK on statin 3.  Further plan for f/up based on results

## 2024-09-23 ENCOUNTER — APPOINTMENT (OUTPATIENT)
Dept: NEPHROLOGY | Facility: CLINIC | Age: 79
End: 2024-09-23

## 2024-09-23 ENCOUNTER — RX RENEWAL (OUTPATIENT)
Age: 79
End: 2024-09-23

## 2024-09-23 ENCOUNTER — INPATIENT (INPATIENT)
Facility: HOSPITAL | Age: 79
LOS: 7 days | Discharge: HOME | DRG: 312 | End: 2024-10-01
Attending: STUDENT IN AN ORGANIZED HEALTH CARE EDUCATION/TRAINING PROGRAM | Admitting: STUDENT IN AN ORGANIZED HEALTH CARE EDUCATION/TRAINING PROGRAM
Payer: MEDICARE

## 2024-09-23 VITALS
HEIGHT: 62 IN | HEART RATE: 82 BPM | OXYGEN SATURATION: 98 % | TEMPERATURE: 98 F | SYSTOLIC BLOOD PRESSURE: 97 MMHG | WEIGHT: 119.93 LBS | DIASTOLIC BLOOD PRESSURE: 58 MMHG | RESPIRATION RATE: 19 BRPM

## 2024-09-23 DIAGNOSIS — R55 SYNCOPE AND COLLAPSE: ICD-10-CM

## 2024-09-23 LAB
ALBUMIN SERPL ELPH-MCNC: 3.5 G/DL — SIGNIFICANT CHANGE UP (ref 3.3–5)
ALBUMIN SERPL ELPH-MCNC: 3.6 G/DL — SIGNIFICANT CHANGE UP (ref 3.3–5)
ALBUMIN SERPL ELPH-MCNC: 4.2 G/DL — SIGNIFICANT CHANGE UP (ref 3.3–5)
ALP SERPL-CCNC: 41 U/L — SIGNIFICANT CHANGE UP (ref 40–120)
ALP SERPL-CCNC: 43 U/L — SIGNIFICANT CHANGE UP (ref 40–120)
ALP SERPL-CCNC: 87 U/L — SIGNIFICANT CHANGE UP (ref 40–120)
ALT FLD-CCNC: 12 U/L — SIGNIFICANT CHANGE UP (ref 10–45)
ALT FLD-CCNC: 7 U/L — LOW (ref 10–45)
ALT FLD-CCNC: 9 U/L — LOW (ref 10–45)
ANION GAP SERPL CALC-SCNC: 11 MMOL/L — SIGNIFICANT CHANGE UP (ref 5–17)
ANION GAP SERPL CALC-SCNC: 14 MMOL/L — SIGNIFICANT CHANGE UP (ref 5–17)
ANION GAP SERPL CALC-SCNC: 20 MMOL/L — HIGH (ref 5–17)
APPEARANCE UR: CLEAR — SIGNIFICANT CHANGE UP
APTT BLD: 28 SEC — SIGNIFICANT CHANGE UP (ref 24.5–35.6)
AST SERPL-CCNC: 10 U/L — SIGNIFICANT CHANGE UP (ref 10–40)
AST SERPL-CCNC: 16 U/L — SIGNIFICANT CHANGE UP (ref 10–40)
AST SERPL-CCNC: 7 U/L — LOW (ref 10–40)
BACTERIA # UR AUTO: NEGATIVE /HPF — SIGNIFICANT CHANGE UP
BASOPHILS # BLD AUTO: 0.04 K/UL — SIGNIFICANT CHANGE UP (ref 0–0.2)
BASOPHILS NFR BLD AUTO: 0.2 % — SIGNIFICANT CHANGE UP (ref 0–2)
BILIRUB SERPL-MCNC: 0.3 MG/DL — SIGNIFICANT CHANGE UP (ref 0.2–1.2)
BILIRUB SERPL-MCNC: 0.6 MG/DL — SIGNIFICANT CHANGE UP (ref 0.2–1.2)
BILIRUB SERPL-MCNC: 0.6 MG/DL — SIGNIFICANT CHANGE UP (ref 0.2–1.2)
BILIRUB UR-MCNC: NEGATIVE — SIGNIFICANT CHANGE UP
BUN SERPL-MCNC: 11 MG/DL — SIGNIFICANT CHANGE UP (ref 7–23)
BUN SERPL-MCNC: 14 MG/DL — SIGNIFICANT CHANGE UP (ref 7–23)
BUN SERPL-MCNC: 18 MG/DL — SIGNIFICANT CHANGE UP (ref 7–23)
CALCIUM SERPL-MCNC: 10 MG/DL — SIGNIFICANT CHANGE UP (ref 8.4–10.5)
CALCIUM SERPL-MCNC: 7.4 MG/DL — LOW (ref 8.4–10.5)
CALCIUM SERPL-MCNC: 8.1 MG/DL — LOW (ref 8.4–10.5)
CAST: 4 /LPF — SIGNIFICANT CHANGE UP (ref 0–4)
CHLORIDE SERPL-SCNC: 102 MMOL/L — SIGNIFICANT CHANGE UP (ref 96–108)
CHLORIDE SERPL-SCNC: 104 MMOL/L — SIGNIFICANT CHANGE UP (ref 96–108)
CHLORIDE SERPL-SCNC: 93 MMOL/L — LOW (ref 96–108)
CO2 SERPL-SCNC: 19 MMOL/L — LOW (ref 22–31)
CO2 SERPL-SCNC: 20 MMOL/L — LOW (ref 22–31)
CO2 SERPL-SCNC: 22 MMOL/L — SIGNIFICANT CHANGE UP (ref 22–31)
COLOR SPEC: YELLOW — SIGNIFICANT CHANGE UP
CREAT SERPL-MCNC: 0.59 MG/DL — SIGNIFICANT CHANGE UP (ref 0.5–1.3)
CREAT SERPL-MCNC: 0.65 MG/DL — SIGNIFICANT CHANGE UP (ref 0.5–1.3)
CREAT SERPL-MCNC: 0.76 MG/DL — SIGNIFICANT CHANGE UP (ref 0.5–1.3)
DIFF PNL FLD: NEGATIVE — SIGNIFICANT CHANGE UP
EGFR: 80 ML/MIN/1.73M2 — SIGNIFICANT CHANGE UP
EGFR: 90 ML/MIN/1.73M2 — SIGNIFICANT CHANGE UP
EGFR: 92 ML/MIN/1.73M2 — SIGNIFICANT CHANGE UP
EOSINOPHIL # BLD AUTO: 0.01 K/UL — SIGNIFICANT CHANGE UP (ref 0–0.5)
EOSINOPHIL NFR BLD AUTO: 0.1 % — SIGNIFICANT CHANGE UP (ref 0–6)
GAS PNL BLDA: SIGNIFICANT CHANGE UP
GAS PNL BLDA: SIGNIFICANT CHANGE UP
GAS PNL BLDV: SIGNIFICANT CHANGE UP
GLUCOSE SERPL-MCNC: 135 MG/DL — HIGH (ref 70–99)
GLUCOSE SERPL-MCNC: 147 MG/DL — HIGH (ref 70–99)
GLUCOSE SERPL-MCNC: 165 MG/DL — HIGH (ref 70–99)
GLUCOSE UR QL: NEGATIVE MG/DL — SIGNIFICANT CHANGE UP
HCT VFR BLD CALC: 26.6 % — LOW (ref 34.5–45)
HCT VFR BLD CALC: 27.2 % — LOW (ref 34.5–45)
HCT VFR BLD CALC: 29.4 % — LOW (ref 34.5–45)
HCT VFR BLD CALC: 38.9 % — SIGNIFICANT CHANGE UP (ref 34.5–45)
HGB BLD-MCNC: 10.3 G/DL — LOW (ref 11.5–15.5)
HGB BLD-MCNC: 13.2 G/DL — SIGNIFICANT CHANGE UP (ref 11.5–15.5)
HGB BLD-MCNC: 9.3 G/DL — LOW (ref 11.5–15.5)
HGB BLD-MCNC: 9.4 G/DL — LOW (ref 11.5–15.5)
IMM GRANULOCYTES NFR BLD AUTO: 0.5 % — SIGNIFICANT CHANGE UP (ref 0–0.9)
INR BLD: 1.16 RATIO — SIGNIFICANT CHANGE UP (ref 0.85–1.18)
KETONES UR-MCNC: >=160 MG/DL
LEUKOCYTE ESTERASE UR-ACNC: NEGATIVE — SIGNIFICANT CHANGE UP
LIDOCAIN IGE QN: 15 U/L — SIGNIFICANT CHANGE UP (ref 7–60)
LYMPHOCYTES # BLD AUTO: 1.3 K/UL — SIGNIFICANT CHANGE UP (ref 1–3.3)
LYMPHOCYTES # BLD AUTO: 7 % — LOW (ref 13–44)
MAGNESIUM SERPL-MCNC: 1.6 MG/DL — SIGNIFICANT CHANGE UP (ref 1.6–2.6)
MAGNESIUM SERPL-MCNC: 1.9 MG/DL — SIGNIFICANT CHANGE UP (ref 1.6–2.6)
MAGNESIUM SERPL-MCNC: 2.4 MG/DL — SIGNIFICANT CHANGE UP (ref 1.6–2.6)
MCHC RBC-ENTMCNC: 31 PG — SIGNIFICANT CHANGE UP (ref 27–34)
MCHC RBC-ENTMCNC: 31.2 PG — SIGNIFICANT CHANGE UP (ref 27–34)
MCHC RBC-ENTMCNC: 31.4 PG — SIGNIFICANT CHANGE UP (ref 27–34)
MCHC RBC-ENTMCNC: 31.7 PG — SIGNIFICANT CHANGE UP (ref 27–34)
MCHC RBC-ENTMCNC: 33.9 GM/DL — SIGNIFICANT CHANGE UP (ref 32–36)
MCHC RBC-ENTMCNC: 34.6 GM/DL — SIGNIFICANT CHANGE UP (ref 32–36)
MCHC RBC-ENTMCNC: 35 GM/DL — SIGNIFICANT CHANGE UP (ref 32–36)
MCHC RBC-ENTMCNC: 35 GM/DL — SIGNIFICANT CHANGE UP (ref 32–36)
MCV RBC AUTO: 89.9 FL — SIGNIFICANT CHANGE UP (ref 80–100)
MCV RBC AUTO: 90.4 FL — SIGNIFICANT CHANGE UP (ref 80–100)
MCV RBC AUTO: 90.5 FL — SIGNIFICANT CHANGE UP (ref 80–100)
MCV RBC AUTO: 91.3 FL — SIGNIFICANT CHANGE UP (ref 80–100)
MONOCYTES # BLD AUTO: 0.61 K/UL — SIGNIFICANT CHANGE UP (ref 0–0.9)
MONOCYTES NFR BLD AUTO: 3.3 % — SIGNIFICANT CHANGE UP (ref 2–14)
NEUTROPHILS # BLD AUTO: 16.55 K/UL — HIGH (ref 1.8–7.4)
NEUTROPHILS NFR BLD AUTO: 88.9 % — HIGH (ref 43–77)
NITRITE UR-MCNC: NEGATIVE — SIGNIFICANT CHANGE UP
NRBC # BLD: 0 /100 WBCS — SIGNIFICANT CHANGE UP (ref 0–0)
PH UR: 6.5 — SIGNIFICANT CHANGE UP (ref 5–8)
PHOSPHATE SERPL-MCNC: 2.7 MG/DL — SIGNIFICANT CHANGE UP (ref 2.5–4.5)
PHOSPHATE SERPL-MCNC: 3.6 MG/DL — SIGNIFICANT CHANGE UP (ref 2.5–4.5)
PHOSPHATE SERPL-MCNC: 4 MG/DL — SIGNIFICANT CHANGE UP (ref 2.5–4.5)
PLATELET # BLD AUTO: 155 K/UL — SIGNIFICANT CHANGE UP (ref 150–400)
PLATELET # BLD AUTO: 166 K/UL — SIGNIFICANT CHANGE UP (ref 150–400)
PLATELET # BLD AUTO: 171 K/UL — SIGNIFICANT CHANGE UP (ref 150–400)
PLATELET # BLD AUTO: 263 K/UL — SIGNIFICANT CHANGE UP (ref 150–400)
POTASSIUM SERPL-MCNC: 3.7 MMOL/L — SIGNIFICANT CHANGE UP (ref 3.5–5.3)
POTASSIUM SERPL-MCNC: 3.9 MMOL/L — SIGNIFICANT CHANGE UP (ref 3.5–5.3)
POTASSIUM SERPL-MCNC: 4 MMOL/L — SIGNIFICANT CHANGE UP (ref 3.5–5.3)
POTASSIUM SERPL-SCNC: 3.7 MMOL/L — SIGNIFICANT CHANGE UP (ref 3.5–5.3)
POTASSIUM SERPL-SCNC: 3.9 MMOL/L — SIGNIFICANT CHANGE UP (ref 3.5–5.3)
POTASSIUM SERPL-SCNC: 4 MMOL/L — SIGNIFICANT CHANGE UP (ref 3.5–5.3)
PROT SERPL-MCNC: 4.9 G/DL — LOW (ref 6–8.3)
PROT SERPL-MCNC: 4.9 G/DL — LOW (ref 6–8.3)
PROT SERPL-MCNC: 6.7 G/DL — SIGNIFICANT CHANGE UP (ref 6–8.3)
PROT UR-MCNC: SIGNIFICANT CHANGE UP MG/DL
PROTHROM AB SERPL-ACNC: 12.7 SEC — SIGNIFICANT CHANGE UP (ref 9.5–13)
RBC # BLD: 2.96 M/UL — LOW (ref 3.8–5.2)
RBC # BLD: 3.01 M/UL — LOW (ref 3.8–5.2)
RBC # BLD: 3.25 M/UL — LOW (ref 3.8–5.2)
RBC # BLD: 4.26 M/UL — SIGNIFICANT CHANGE UP (ref 3.8–5.2)
RBC # FLD: 11.7 % — SIGNIFICANT CHANGE UP (ref 10.3–14.5)
RBC # FLD: 11.9 % — SIGNIFICANT CHANGE UP (ref 10.3–14.5)
RBC CASTS # UR COMP ASSIST: 2 /HPF — SIGNIFICANT CHANGE UP (ref 0–4)
SODIUM SERPL-SCNC: 133 MMOL/L — LOW (ref 135–145)
SODIUM SERPL-SCNC: 135 MMOL/L — SIGNIFICANT CHANGE UP (ref 135–145)
SODIUM SERPL-SCNC: 137 MMOL/L — SIGNIFICANT CHANGE UP (ref 135–145)
SP GR SPEC: >1.03 — HIGH (ref 1–1.03)
SQUAMOUS # UR AUTO: 2 /HPF — SIGNIFICANT CHANGE UP (ref 0–5)
TROPONIN T, HIGH SENSITIVITY RESULT: <6 NG/L — SIGNIFICANT CHANGE UP (ref 0–51)
TSH SERPL-MCNC: 3.28 UIU/ML — SIGNIFICANT CHANGE UP (ref 0.27–4.2)
UROBILINOGEN FLD QL: 0.2 MG/DL — SIGNIFICANT CHANGE UP (ref 0.2–1)
WBC # BLD: 13.29 K/UL — HIGH (ref 3.8–10.5)
WBC # BLD: 14.16 K/UL — HIGH (ref 3.8–10.5)
WBC # BLD: 14.96 K/UL — HIGH (ref 3.8–10.5)
WBC # BLD: 18.61 K/UL — HIGH (ref 3.8–10.5)
WBC # FLD AUTO: 13.29 K/UL — HIGH (ref 3.8–10.5)
WBC # FLD AUTO: 14.16 K/UL — HIGH (ref 3.8–10.5)
WBC # FLD AUTO: 14.96 K/UL — HIGH (ref 3.8–10.5)
WBC # FLD AUTO: 18.61 K/UL — HIGH (ref 3.8–10.5)
WBC UR QL: 5 /HPF — SIGNIFICANT CHANGE UP (ref 0–5)

## 2024-09-23 PROCEDURE — 99285 EMERGENCY DEPT VISIT HI MDM: CPT

## 2024-09-23 PROCEDURE — 88307 TISSUE EXAM BY PATHOLOGIST: CPT | Mod: 26

## 2024-09-23 PROCEDURE — 74177 CT ABD & PELVIS W/CONTRAST: CPT | Mod: 26,MC

## 2024-09-23 PROCEDURE — 71045 X-RAY EXAM CHEST 1 VIEW: CPT | Mod: 26

## 2024-09-23 PROCEDURE — 70450 CT HEAD/BRAIN W/O DYE: CPT | Mod: 26,MC

## 2024-09-23 PROCEDURE — 88302 TISSUE EXAM BY PATHOLOGIST: CPT | Mod: 26

## 2024-09-23 DEVICE — CLIP APPLIER COVIDIEN ENDOCLIP 5MM: Type: IMPLANTABLE DEVICE | Site: ABDOMINAL | Status: FUNCTIONAL

## 2024-09-23 DEVICE — SURGICEL FIBRILLAR 2 X 4": Type: IMPLANTABLE DEVICE | Site: ABDOMINAL | Status: FUNCTIONAL

## 2024-09-23 DEVICE — STAPLER COVIDIEN TA 90 GREEN: Type: IMPLANTABLE DEVICE | Site: ABDOMINAL | Status: FUNCTIONAL

## 2024-09-23 DEVICE — SURGICEL 2 X 14": Type: IMPLANTABLE DEVICE | Site: ABDOMINAL | Status: FUNCTIONAL

## 2024-09-23 DEVICE — STAPLER COVIDIEN TRI-STAPLE 60MM PURPLE RELOAD: Type: IMPLANTABLE DEVICE | Site: ABDOMINAL | Status: FUNCTIONAL

## 2024-09-23 DEVICE — VISTASEAL FIBRIN HUMAN 4ML: Type: IMPLANTABLE DEVICE | Site: ABDOMINAL | Status: FUNCTIONAL

## 2024-09-23 DEVICE — KIT A-LINE 1LUM 20G X 12CM SAFE KIT: Type: IMPLANTABLE DEVICE | Site: ABDOMINAL | Status: FUNCTIONAL

## 2024-09-23 DEVICE — STAPLER COVIDIEN GIA 80-3.0MM PURPLE RELOAD: Type: IMPLANTABLE DEVICE | Site: ABDOMINAL | Status: FUNCTIONAL

## 2024-09-23 DEVICE — STAPLER COVIDIEN GIA 80-3.0MM PURPLE: Type: IMPLANTABLE DEVICE | Site: ABDOMINAL | Status: FUNCTIONAL

## 2024-09-23 DEVICE — CLIP APPLIER COVIDIEN ENDOCLIP II 10MM MED/LG: Type: IMPLANTABLE DEVICE | Site: ABDOMINAL | Status: FUNCTIONAL

## 2024-09-23 DEVICE — STAPLER COVIDIEN TRI-STAPLE 45MM PURPLE RELOAD: Type: IMPLANTABLE DEVICE | Site: ABDOMINAL | Status: FUNCTIONAL

## 2024-09-23 DEVICE — STAPLER COVIDIEN ENDO GIA 80-3.8MM BLUE: Type: IMPLANTABLE DEVICE | Site: ABDOMINAL | Status: FUNCTIONAL

## 2024-09-23 DEVICE — STAPLER COVIDIEN TA 90 BLUE: Type: IMPLANTABLE DEVICE | Site: ABDOMINAL | Status: FUNCTIONAL

## 2024-09-23 DEVICE — STAPLER COVIDIEN TRI-STAPLE 45MM TAN RELOAD: Type: IMPLANTABLE DEVICE | Site: ABDOMINAL | Status: FUNCTIONAL

## 2024-09-23 DEVICE — LIGATING CLIPS WECK HEMOLOK POLYMER MEDIUM-LARGE (GREEN) 6: Type: IMPLANTABLE DEVICE | Site: ABDOMINAL | Status: FUNCTIONAL

## 2024-09-23 RX ORDER — ACETAMINOPHEN 325 MG
1000 TABLET ORAL EVERY 6 HOURS
Refills: 0 | Status: COMPLETED | OUTPATIENT
Start: 2024-09-23 | End: 2024-09-24

## 2024-09-23 RX ORDER — FAMOTIDINE 40 MG
20 TABLET ORAL ONCE
Refills: 0 | Status: COMPLETED | OUTPATIENT
Start: 2024-09-23 | End: 2024-09-23

## 2024-09-23 RX ORDER — NOREPINEPHRINE BITARTRATE/D5W 16MG/250ML
0.04 PLASTIC BAG, INJECTION (ML) INTRAVENOUS
Qty: 8 | Refills: 0 | Status: DISCONTINUED | OUTPATIENT
Start: 2024-09-23 | End: 2024-09-24

## 2024-09-23 RX ORDER — SODIUM CHLORIDE IRRIG SOLUTION 0.9 %
1000 SOLUTION, IRRIGATION IRRIGATION
Refills: 0 | Status: DISCONTINUED | OUTPATIENT
Start: 2024-09-23 | End: 2024-09-27

## 2024-09-23 RX ORDER — SODIUM CHLORIDE IRRIG SOLUTION 0.9 %
500 SOLUTION, IRRIGATION IRRIGATION ONCE
Refills: 0 | Status: COMPLETED | OUTPATIENT
Start: 2024-09-23 | End: 2024-09-23

## 2024-09-23 RX ORDER — PIPERACILLIN SODIUM AND TAZOBACTAM SODIUM 12; 1.5 G/60ML; G/60ML
3.38 INJECTION, POWDER, LYOPHILIZED, FOR SOLUTION INTRAVENOUS ONCE
Refills: 0 | Status: COMPLETED | OUTPATIENT
Start: 2024-09-23 | End: 2024-09-23

## 2024-09-23 RX ORDER — CHLORHEXIDINE GLUCONATE ORAL RINSE 1.2 MG/ML
1 SOLUTION DENTAL
Refills: 0 | Status: DISCONTINUED | OUTPATIENT
Start: 2024-09-23 | End: 2024-10-01

## 2024-09-23 RX ORDER — PIPERACILLIN SODIUM AND TAZOBACTAM SODIUM 12; 1.5 G/60ML; G/60ML
3.38 INJECTION, POWDER, LYOPHILIZED, FOR SOLUTION INTRAVENOUS ONCE
Refills: 0 | Status: COMPLETED | OUTPATIENT
Start: 2024-09-24 | End: 2024-09-24

## 2024-09-23 RX ORDER — MORPHINE SULFATE 30 MG/1
4 TABLET, FILM COATED, EXTENDED RELEASE ORAL ONCE
Refills: 0 | Status: DISCONTINUED | OUTPATIENT
Start: 2024-09-23 | End: 2024-09-23

## 2024-09-23 RX ORDER — ACETAMINOPHEN 325 MG
1000 TABLET ORAL ONCE
Refills: 0 | Status: COMPLETED | OUTPATIENT
Start: 2024-09-23 | End: 2024-09-23

## 2024-09-23 RX ORDER — OMEPRAZOLE 20 MG/1
20 CAPSULE, DELAYED RELEASE ORAL
Qty: 90 | Refills: 1 | Status: ACTIVE | COMMUNITY
Start: 2024-09-23 | End: 1900-01-01

## 2024-09-23 RX ORDER — ENOXAPARIN SODIUM 150 MG/ML
40 INJECTION SUBCUTANEOUS EVERY 24 HOURS
Refills: 0 | Status: DISCONTINUED | OUTPATIENT
Start: 2024-09-23 | End: 2024-10-01

## 2024-09-23 RX ORDER — PIPERACILLIN SODIUM AND TAZOBACTAM SODIUM 12; 1.5 G/60ML; G/60ML
3.38 INJECTION, POWDER, LYOPHILIZED, FOR SOLUTION INTRAVENOUS EVERY 8 HOURS
Refills: 0 | Status: COMPLETED | OUTPATIENT
Start: 2024-09-24 | End: 2024-09-24

## 2024-09-23 RX ORDER — SODIUM CHLORIDE 0.9 % (FLUSH) 0.9 %
1000 SYRINGE (ML) INJECTION ONCE
Refills: 0 | Status: COMPLETED | OUTPATIENT
Start: 2024-09-23 | End: 2024-09-23

## 2024-09-23 RX ORDER — SODIUM CHLORIDE 0.9 % (FLUSH) 0.9 %
1 SYRINGE (ML) INJECTION
Refills: 0 | DISCHARGE

## 2024-09-23 RX ORDER — HYDROMORPHONE HYDROCHLORIDE 1 MG/ML
0.25 INJECTION, SOLUTION INTRAMUSCULAR; INTRAVENOUS; SUBCUTANEOUS
Refills: 0 | Status: DISCONTINUED | OUTPATIENT
Start: 2024-09-23 | End: 2024-09-23

## 2024-09-23 RX ORDER — SODIUM CHLORIDE IRRIG SOLUTION 0.9 %
1000 SOLUTION, IRRIGATION IRRIGATION ONCE
Refills: 0 | Status: COMPLETED | OUTPATIENT
Start: 2024-09-23 | End: 2024-09-23

## 2024-09-23 RX ORDER — INFLUENZA VIRUS VACCINE 15; 15; 15; 15 UG/.5ML; UG/.5ML; UG/.5ML; UG/.5ML
0.5 SUSPENSION INTRAMUSCULAR ONCE
Refills: 0 | Status: DISCONTINUED | OUTPATIENT
Start: 2024-09-23 | End: 2024-10-01

## 2024-09-23 RX ORDER — ONDANSETRON HCL/PF 4 MG/2 ML
4 VIAL (ML) INJECTION ONCE
Refills: 0 | Status: COMPLETED | OUTPATIENT
Start: 2024-09-23 | End: 2024-09-23

## 2024-09-23 RX ORDER — ROSUVASTATIN CALCIUM 20 MG/1
1 TABLET, COATED ORAL
Refills: 0 | DISCHARGE

## 2024-09-23 RX ORDER — MAGNESIUM SULFATE 500 MG/ML
2 VIAL (ML) INJECTION ONCE
Refills: 0 | Status: COMPLETED | OUTPATIENT
Start: 2024-09-23 | End: 2024-09-23

## 2024-09-23 RX ADMIN — Medication 100 MILLIEQUIVALENT(S): at 17:18

## 2024-09-23 RX ADMIN — Medication 1000 MILLIGRAM(S): at 17:48

## 2024-09-23 RX ADMIN — Medication 400 MILLIGRAM(S): at 02:02

## 2024-09-23 RX ADMIN — HYDROMORPHONE HYDROCHLORIDE 0.25 MILLIGRAM(S): 1 INJECTION, SOLUTION INTRAMUSCULAR; INTRAVENOUS; SUBCUTANEOUS at 13:51

## 2024-09-23 RX ADMIN — Medication 1000 MILLILITER(S): at 03:35

## 2024-09-23 RX ADMIN — Medication 1000 MILLILITER(S): at 13:09

## 2024-09-23 RX ADMIN — Medication 1000 MILLILITER(S): at 02:02

## 2024-09-23 RX ADMIN — Medication 4 MILLIGRAM(S): at 02:03

## 2024-09-23 RX ADMIN — Medication 40 MILLILITER(S): at 22:26

## 2024-09-23 RX ADMIN — Medication 75 MILLILITER(S): at 13:09

## 2024-09-23 RX ADMIN — HYDROMORPHONE HYDROCHLORIDE 0.25 MILLIGRAM(S): 1 INJECTION, SOLUTION INTRAMUSCULAR; INTRAVENOUS; SUBCUTANEOUS at 13:36

## 2024-09-23 RX ADMIN — Medication 1000 MILLIGRAM(S): at 08:05

## 2024-09-23 RX ADMIN — CHLORHEXIDINE GLUCONATE ORAL RINSE 1 APPLICATION(S): 1.2 SOLUTION DENTAL at 17:35

## 2024-09-23 RX ADMIN — Medication 400 MILLIGRAM(S): at 23:18

## 2024-09-23 RX ADMIN — Medication 100 MILLIEQUIVALENT(S): at 15:14

## 2024-09-23 RX ADMIN — ENOXAPARIN SODIUM 40 MILLIGRAM(S): 150 INJECTION SUBCUTANEOUS at 17:18

## 2024-09-23 RX ADMIN — Medication 25 GRAM(S): at 14:31

## 2024-09-23 RX ADMIN — MORPHINE SULFATE 4 MILLIGRAM(S): 30 TABLET, FILM COATED, EXTENDED RELEASE ORAL at 03:34

## 2024-09-23 RX ADMIN — PIPERACILLIN SODIUM AND TAZOBACTAM SODIUM 25 GRAM(S): 12; 1.5 INJECTION, POWDER, LYOPHILIZED, FOR SOLUTION INTRAVENOUS at 17:18

## 2024-09-23 RX ADMIN — Medication 200 GRAM(S): at 13:36

## 2024-09-23 RX ADMIN — MORPHINE SULFATE 4 MILLIGRAM(S): 30 TABLET, FILM COATED, EXTENDED RELEASE ORAL at 08:05

## 2024-09-23 RX ADMIN — Medication 400 MILLIGRAM(S): at 17:18

## 2024-09-23 RX ADMIN — Medication 500 MILLILITER(S): at 08:05

## 2024-09-23 RX ADMIN — Medication 20 MILLIGRAM(S): at 02:36

## 2024-09-23 RX ADMIN — Medication 100 MILLIEQUIVALENT(S): at 18:39

## 2024-09-23 RX ADMIN — PIPERACILLIN SODIUM AND TAZOBACTAM SODIUM 200 GRAM(S): 12; 1.5 INJECTION, POWDER, LYOPHILIZED, FOR SOLUTION INTRAVENOUS at 13:36

## 2024-09-23 RX ADMIN — Medication 75 MILLILITER(S): at 19:46

## 2024-09-23 RX ADMIN — MORPHINE SULFATE 4 MILLIGRAM(S): 30 TABLET, FILM COATED, EXTENDED RELEASE ORAL at 06:38

## 2024-09-23 NOTE — ED PROVIDER NOTE - OBJECTIVE STATEMENT
78-year-old past medical history HTN, syncope presents to ED s/p syncopal episode.  Noted head strike and LOC.  Not on AC.  Patient endorsing acute onset diffuse abdominal pain with associated NBNB emesis prior to event.  Noted prodromal symptoms of lightheadedness and vision fading.  Noted patient had a syncopal episode 1 month prior and is getting worked up outpatient by PCP.  Denies fever, chills, chest pain, SOB, change in bowel movement, urinary symptoms.  No PSH.  No recent travel or sick contacts.

## 2024-09-23 NOTE — H&P ADULT - NSHPPHYSICALEXAM_GEN_ALL_CORE
General: Resting comfortably in bed in no acute distress.   Neurologic: Alert, oriented, and appropriately responds to questions.   Psychiatric: Euthymic mood, calm affect, linear thought process, appropriate thought content.   Respiratory: Equal chest wall expansion bilaterally with no accessory muscle use, no tachypnea, and no grossly increased work of breathing on room air.   Cardiovascular: Regular rate and rhythm by palpation of peripheral pulse.   Abdomen: Soft, nontender and nondistended. No rebound tenderness or guarding is elicited. Nasogastric tube placed without difficulty with 150 cc brown output.   Extremities: Warm and well-perfused.

## 2024-09-23 NOTE — ED PROVIDER NOTE - ED STEMI HIDDEN
21 minute EEG hide 41-year-old female with psychiatric history of bipolar disorder, most recent hospitalization in 2019, s/p appendectomy this admission. Psychiatry was consulted for mental health evaluation for clearance prior to discharge. On evaluation, despite fast speech, the patient is goal-directed and engaged with outpatient psychiatric care. No active psychosis elicited. The patient denies suicidal or homicidal ideation. The patient's mother confirmed patient's baseline hyperverbosity and fast speech. There is no current psychiatric contraindication for discharge.     Recommendations:  - Continue home medications, including:  Mirtazapine 45 mg PO nightly  Adderall XR 20 mg PO every other day  Quetiapine 100 mg PO nightly  - Follow-up appointment with psychiatrist on 7/23/2020 [place] [address] Phone: [phone]

## 2024-09-23 NOTE — ED ADULT NURSE NOTE - NSFALLRISKINTERV_ED_ALL_ED
Assistance OOB with selected safe patient handling equipment if applicable/Assistance with ambulation/Communicate fall risk and risk factors to all staff, patient, and family/Encourage patient to sit up slowly, dangle for a short time, stand at bedside before walking/Orthostatic vital signs/Provide visual cue: yellow wristband, yellow gown, etc/Reinforce activity limits and safety measures with patient and family/Call bell, personal items and telephone in reach/Instruct patient to call for assistance before getting out of bed/chair/stretcher/Non-slip footwear applied when patient is off stretcher/[place] to call system/Physically safe environment - no spills, clutter or unnecessary equipment/Purposeful Proactive Rounding/Room/bathroom lighting operational, light cord in reach

## 2024-09-23 NOTE — ED ADULT NURSE NOTE - OBJECTIVE STATEMENT
79 y/o female presents to the ED BIBEMS s/p unwitnessed syncopal episode. A/OX4. Ambulatory without assistive devices at baseline. PMH: HLD. Patient endorses that she was ambulating from the bathroom to the kitchen when she awoke on the floor. Patient does not recall the event. Unknown LOC and unknown headstrike. Patient endorses nausea w/ non-bloody emesis x3 episodes. Patient endorses the abdominal pain began at 18:30. Patient endorses 10/10 generalized abdominal pain. Patient denies chest pain, dyspnea, fever, cough, chills and numbness/tingling. Abrasion noted to the L knee. Upon assessment, abdomen is soft, non-tender and non-distended. Patient on CM, NSR. Safety and comfort provided.

## 2024-09-23 NOTE — CONSULT NOTE ADULT - SUBJECTIVE AND OBJECTIVE BOX
HISTORY OF PRESENT ILLNESS:  BATOOL FRANCES is a 78y F PMH HTN, syncope presented to ED s/p syncopal episode, - HS/LOC, no AC. Prior to syncope, patient had acute onset abdominal pain and NBNB emesis. In ED, AVSS, mild hypotension SBP mid90s. WBC 18.6. VBG lactate 2.7. CT A/P concerning for closed loop SBO. Surgery consulted and patient was taken emergently to OR.   Intra-operatively patient requiring vasopressor support, unable to be weaned. SICU consulted for post operative care requiring vasopressor support and ventilator management.    --------------------------------------------------------------------------------------  PAST MEDICAL HISTORY:   HTN  syncope    PAST SURGICAL HISTORY:       FAMILY HISTORY:       HOME MEDICATIONS:  Home Medications:  rosuvastatin 5 mg oral tablet: 1 tab(s) orally once a day (at bedtime) (23 Sep 2024 08:15)  sodium chloride: 1 gram(s) 3 times a day 1 gram sodium chloride tablet TID for hyponatremia. (23 Sep 2024 08:16)      ALLERGIES:   No Known Allergies      --------------------------------------------------------------------------------------    VITAL SIGNS:  ICU Vital Signs Last 24 Hrs  T(C): 36.6 (23 Sep 2024 09:05), Max: 36.7 (23 Sep 2024 03:00)  T(F): 97.8 (23 Sep 2024 07:27), Max: 98 (23 Sep 2024 03:00)  HR: 90 (23 Sep 2024 09:05) (74 - 90)  BP: 89/59 (23 Sep 2024 09:05) (89/59 - 118/65)  BP(mean): 70 (23 Sep 2024 09:05) (66 - 80)  ABP: --  ABP(mean): --  RR: 20 (23 Sep 2024 09:05) (18 - 20)  SpO2: 100% (23 Sep 2024 09:05) (96% - 100%)    O2 Parameters below as of 23 Sep 2024 08:04  Patient On (Oxygen Delivery Method): room air        PHYSICAL EXAM:    NEURO  Exam:    RESPIRATORY  Exam:   Mechanical Ventilation:     CARDIOVASCULAR  Exam:  Cardiac Rhythm:    GI/NUTRITION  Exam:  Diet:    GENITOURINARY/RENAL  Exam:   [ ] Martinez catheter, indication: urine output monitoring in critically ill patient    HEMATOLOGIC  VTE Prophylaxis:    ENDOCRINE  Glucose:  CAPILLARY BLOOD GLUCOSE      POCT Blood Glucose.: 140 mg/dL (23 Sep 2024 01:10)        PATIENT CARE ACCESS DEVICES:  [ ] Peripheral IV  [ ] Central Venous Line	[ ] R	[ ] L	[ ] IJ	[ ] Fem	[ ] SC	Placed:   [ ] Arterial Line		[ ] R	[ ] L	[ ] Fem	[ ] Rad	[ ] Ax	Placed:   [ ] PICC:					[ ] Mediport  [ ] Urinary Catheter, Date Placed:   [x] Necessity of urinary, arterial, and venous catheters discussed      I/Os:  I&O's Summary      --------------------------------------------------------------------------------------  MEDICATIONS:   Neurologic Medications    Respiratory Medications    Cardiovascular Medications    Gastrointestinal Medications    Genitourinary Medications    Hematologic/Oncologic Medications    Antimicrobial/Immunologic Medications    Endocrine/Metabolic Medications    Topical/Other Medications      --------------------------------------------------------------------------------------  LABS:                         13.2   18.61 )-----------( 263      ( 23 Sep 2024 01:55 )             38.9   09-23    133[L]  |  93[L]  |  18  ----------------------------<  165[H]  4.0   |  20[L]  |  0.76    Ca    10.0      23 Sep 2024 01:55  Phos  3.6     09-23  Mg     1.9     09-23    TPro  6.7  /  Alb  4.2  /  TBili  0.6  /  DBili  x   /  AST  16  /  ALT  12  /  AlkPhos  87  09-23  ABG - ( 23 Sep 2024 11:05 )  pH, Arterial: 7.32  pH, Blood: x     /  pCO2: 36    /  pO2: 219   / HCO3: 18    / Base Excess: -6.9  /  SaO2: 100.0           Urinalysis Basic - ( 23 Sep 2024 06:05 )    Color: Yellow / Appearance: Clear / SG: >1.030 / pH: x  Gluc: x / Ketone: >=160 mg/dL  / Bili: Negative / Urobili: 0.2 mg/dL   Blood: x / Protein: Trace mg/dL / Nitrite: Negative   Leuk Esterase: Negative / RBC: 2 /HPF / WBC 5 /HPF   Sq Epi: x / Non Sq Epi: 2 /HPF / Bacteria: Negative /HPF     HISTORY OF PRESENT ILLNESS:  BATOOL FRANCES is a 78y F PMH HTN (diet managed), HLD, chronic hyponatremia (on salt tabs) presented to ED s/p syncopal episode, - HS/LOC, no AC. Prior to syncope, patient had acute onset abdominal pain and NBNB emesis. In ED, AVSS, mild hypotension SBP mid90s. WBC 18.6. VBG lactate 2.7. CT A/P concerning for closed loop SBO. Surgery consulted and patient was taken emergently to OR.     Per surgical team, surgery started laparoscopically. Found to have omental band causing SBO. Band lysed laparoscopically, but converted to open for findings of >100 cc ischemic bowel.     Intra-operatively patient requiring vasopressor support, unable to be weaned. SICU consulted for post operative care requiring vasopressor support.    OPERATIVE  EBL 20   urine 300  1 L normosol, 600 cc LR  100 cc bowel resected from midjejunum to end of ileum, side to side anastomosis  vaso max intraop 1.2, down to 0.4 upon presentation to SICU  --------------------------------------------------------------------------------------  PAST MEDICAL HISTORY:   HTN  syncope    PAST SURGICAL HISTORY:       FAMILY HISTORY:       HOME MEDICATIONS:  Home Medications:  rosuvastatin 5 mg oral tablet: 1 tab(s) orally once a day (at bedtime) (23 Sep 2024 08:15)  sodium chloride: 1 gram(s) 3 times a day 1 gram sodium chloride tablet TID for hyponatremia. (23 Sep 2024 08:16)      ALLERGIES:   No Known Allergies      --------------------------------------------------------------------------------------    VITAL SIGNS:  ICU Vital Signs Last 24 Hrs  T(C): 36.6 (23 Sep 2024 09:05), Max: 36.7 (23 Sep 2024 03:00)  T(F): 97.8 (23 Sep 2024 07:27), Max: 98 (23 Sep 2024 03:00)  HR: 90 (23 Sep 2024 09:05) (74 - 90)  BP: 89/59 (23 Sep 2024 09:05) (89/59 - 118/65)  BP(mean): 70 (23 Sep 2024 09:05) (66 - 80)  ABP: --  ABP(mean): --  RR: 20 (23 Sep 2024 09:05) (18 - 20)  SpO2: 100% (23 Sep 2024 09:05) (96% - 100%)    O2 Parameters below as of 23 Sep 2024 08:04  Patient On (Oxygen Delivery Method): room air        PHYSICAL EXAM:    NEURO  Exam: Baseline A&Ox3    RESPIRATORY  Exam: nonlabored breathing, on 2 L NC    CARDIOVASCULAR  Exam: RRR, on vaso 0.4    GI/NUTRITION  Exam: soft, midline incision covered w abthera, mild distension, appropraite ttp   Diet: NPO, NGT    GENITOURINARY/RENAL  Exam:  urine clear yellow  [x] Martinez catheter, indication: urine output monitoring in critically ill patient    HEMATOLOGIC  VTE Prophylaxis: SQH    ENDOCRINE  Glucose:  CAPILLARY BLOOD GLUCOSE      POCT Blood Glucose.: 140 mg/dL (23 Sep 2024 01:10)        PATIENT CARE ACCESS DEVICES:  [x ] Peripheral IV  [ ] Central Venous Line	[ ] R	[ ] L	[ ] IJ	[ ] Fem	[ ] SC	Placed:   [x ] Arterial Line		[ ] R	[x ] L	[ ] Fem	[x ] Rad	[ ] Ax	Placed: 9/23  [ ] PICC:					[ ] Mediport  [x ] Urinary Catheter, Date Placed: 9/23  [x] Necessity of urinary, arterial, and venous catheters discussed      I/Os:  I&O's Summary      --------------------------------------------------------------------------------------  MEDICATIONS:   Neurologic Medications    Respiratory Medications    Cardiovascular Medications    Gastrointestinal Medications    Genitourinary Medications    Hematologic/Oncologic Medications    Antimicrobial/Immunologic Medications    Endocrine/Metabolic Medications    Topical/Other Medications      --------------------------------------------------------------------------------------  LABS:                         13.2   18.61 )-----------( 263      ( 23 Sep 2024 01:55 )             38.9   09-23    133[L]  |  93[L]  |  18  ----------------------------<  165[H]  4.0   |  20[L]  |  0.76    Ca    10.0      23 Sep 2024 01:55  Phos  3.6     09-23  Mg     1.9     09-23    TPro  6.7  /  Alb  4.2  /  TBili  0.6  /  DBili  x   /  AST  16  /  ALT  12  /  AlkPhos  87  09-23  ABG - ( 23 Sep 2024 11:05 )  pH, Arterial: 7.32  pH, Blood: x     /  pCO2: 36    /  pO2: 219   / HCO3: 18    / Base Excess: -6.9  /  SaO2: 100.0           Urinalysis Basic - ( 23 Sep 2024 06:05 )    Color: Yellow / Appearance: Clear / SG: >1.030 / pH: x  Gluc: x / Ketone: >=160 mg/dL  / Bili: Negative / Urobili: 0.2 mg/dL   Blood: x / Protein: Trace mg/dL / Nitrite: Negative   Leuk Esterase: Negative / RBC: 2 /HPF / WBC 5 /HPF   Sq Epi: x / Non Sq Epi: 2 /HPF / Bacteria: Negative /HPF     HISTORY OF PRESENT ILLNESS:  BATOOL FRANCES is a 78y F PMH HTN (diet managed), HLD, chronic hyponatremia (on salt tabs) presented to ED s/p syncopal episode, - HS/LOC, no AC. Prior to syncope, patient had acute onset abdominal pain and NBNB emesis. In ED, AVSS, mild hypotension SBP mid90s. WBC 18.6. VBG lactate 2.7. CT A/P concerning for closed loop SBO. Surgery consulted and patient was taken emergently to OR.     Per surgical team, surgery started laparoscopically. Found to have omental band causing SBO. Band lysed laparoscopically, but converted to open for findings of >100 cc ischemic bowel.     Intra-operatively patient requiring vasopressor support, unable to be weaned. SICU consulted for post operative care requiring vasopressor support.    OPERATIVE  EBL 20   urine 300  1 L normosol, 600 cc LR  100 cc bowel resected from midjejunum to end of ileum, side to side anastomosis  vaso max intraop 1.2, down to 0.4 upon presentation to SICU  --------------------------------------------------------------------------------------  PAST MEDICAL HISTORY:   HTN  syncope    PAST SURGICAL HISTORY:       FAMILY HISTORY:       HOME MEDICATIONS:  Home Medications:  rosuvastatin 5 mg oral tablet: 1 tab(s) orally once a day (at bedtime) (23 Sep 2024 08:15)  sodium chloride: 1 gram(s) 3 times a day 1 gram sodium chloride tablet TID for hyponatremia. (23 Sep 2024 08:16)      ALLERGIES:   No Known Allergies      --------------------------------------------------------------------------------------    VITAL SIGNS:  ICU Vital Signs Last 24 Hrs  T(C): 36.6 (23 Sep 2024 09:05), Max: 36.7 (23 Sep 2024 03:00)  T(F): 97.8 (23 Sep 2024 07:27), Max: 98 (23 Sep 2024 03:00)  HR: 90 (23 Sep 2024 09:05) (74 - 90)  BP: 89/59 (23 Sep 2024 09:05) (89/59 - 118/65)  BP(mean): 70 (23 Sep 2024 09:05) (66 - 80)  ABP: --  ABP(mean): --  RR: 20 (23 Sep 2024 09:05) (18 - 20)  SpO2: 100% (23 Sep 2024 09:05) (96% - 100%)    O2 Parameters below as of 23 Sep 2024 08:04  Patient On (Oxygen Delivery Method): room air        PHYSICAL EXAM:    NEURO  Exam: Baseline A&Ox3    RESPIRATORY  Exam: nonlabored breathing, on 2 L NC    CARDIOVASCULAR  Exam: RRR, on vaso 0.4    GI/NUTRITION  Exam: soft, midline incision covered w prevena, mild distension, appropraite ttp   Diet: NPO, NGT    GENITOURINARY/RENAL  Exam:  urine clear yellow  [x] Martinez catheter, indication: urine output monitoring in critically ill patient    HEMATOLOGIC  VTE Prophylaxis: SQH    ENDOCRINE  Glucose:  CAPILLARY BLOOD GLUCOSE      POCT Blood Glucose.: 140 mg/dL (23 Sep 2024 01:10)        PATIENT CARE ACCESS DEVICES:  [x ] Peripheral IV  [ ] Central Venous Line	[ ] R	[ ] L	[ ] IJ	[ ] Fem	[ ] SC	Placed:   [x ] Arterial Line		[ ] R	[x ] L	[ ] Fem	[x ] Rad	[ ] Ax	Placed: 9/23  [ ] PICC:					[ ] Mediport  [x ] Urinary Catheter, Date Placed: 9/23  [x] Necessity of urinary, arterial, and venous catheters discussed      I/Os:  I&O's Summary      --------------------------------------------------------------------------------------  MEDICATIONS:   Neurologic Medications    Respiratory Medications    Cardiovascular Medications    Gastrointestinal Medications    Genitourinary Medications    Hematologic/Oncologic Medications    Antimicrobial/Immunologic Medications    Endocrine/Metabolic Medications    Topical/Other Medications      --------------------------------------------------------------------------------------  LABS:                         13.2   18.61 )-----------( 263      ( 23 Sep 2024 01:55 )             38.9   09-23    133[L]  |  93[L]  |  18  ----------------------------<  165[H]  4.0   |  20[L]  |  0.76    Ca    10.0      23 Sep 2024 01:55  Phos  3.6     09-23  Mg     1.9     09-23    TPro  6.7  /  Alb  4.2  /  TBili  0.6  /  DBili  x   /  AST  16  /  ALT  12  /  AlkPhos  87  09-23  ABG - ( 23 Sep 2024 11:05 )  pH, Arterial: 7.32  pH, Blood: x     /  pCO2: 36    /  pO2: 219   / HCO3: 18    / Base Excess: -6.9  /  SaO2: 100.0           Urinalysis Basic - ( 23 Sep 2024 06:05 )    Color: Yellow / Appearance: Clear / SG: >1.030 / pH: x  Gluc: x / Ketone: >=160 mg/dL  / Bili: Negative / Urobili: 0.2 mg/dL   Blood: x / Protein: Trace mg/dL / Nitrite: Negative   Leuk Esterase: Negative / RBC: 2 /HPF / WBC 5 /HPF   Sq Epi: x / Non Sq Epi: 2 /HPF / Bacteria: Negative /HPF     HISTORY OF PRESENT ILLNESS:  BATOOL FRANCES is a 78y F PMH HTN (diet managed), HLD, chronic hyponatremia (on salt tabs) presented to ED s/p syncopal episode, - HS/LOC, no AC. Prior to syncope, patient had acute onset abdominal pain and NBNB emesis. In ED, AVSS, mild hypotension SBP mid90s. WBC 18.6. VBG lactate 2.7. CT A/P concerning for closed loop SBO. Surgery consulted and patient was taken emergently to OR.     Per surgical team, surgery started laparoscopically. Found to have omental band causing SBO. Band lysed laparoscopically, but converted to open for findings of >100 cm ischemic bowel.     Intra-operatively patient requiring vasopressor support, unable to be weaned. SICU consulted for post operative care requiring vasopressor support.    OPERATIVE  EBL 20   urine 300  1 L normosol, 600 cc LR  100 cm bowel resected from midjejunum to end of ileum, side to side anastomosis  levo max intraop 1.2, down to 0.04 upon presentation to SICU  --------------------------------------------------------------------------------------  PAST MEDICAL HISTORY:   HTN  HLD  hypnatremia    PAST SURGICAL HISTORY:       FAMILY HISTORY:       HOME MEDICATIONS:  Home Medications:  rosuvastatin 5 mg oral tablet: 1 tab(s) orally once a day (at bedtime) (23 Sep 2024 08:15)  sodium chloride: 1 gram(s) 3 times a day 1 gram sodium chloride tablet TID for hyponatremia. (23 Sep 2024 08:16)      ALLERGIES:   No Known Allergies      --------------------------------------------------------------------------------------    VITAL SIGNS:  ICU Vital Signs Last 24 Hrs  T(C): 36.6 (23 Sep 2024 09:05), Max: 36.7 (23 Sep 2024 03:00)  T(F): 97.8 (23 Sep 2024 07:27), Max: 98 (23 Sep 2024 03:00)  HR: 90 (23 Sep 2024 09:05) (74 - 90)  BP: 89/59 (23 Sep 2024 09:05) (89/59 - 118/65)  BP(mean): 70 (23 Sep 2024 09:05) (66 - 80)  ABP: --  ABP(mean): --  RR: 20 (23 Sep 2024 09:05) (18 - 20)  SpO2: 100% (23 Sep 2024 09:05) (96% - 100%)    O2 Parameters below as of 23 Sep 2024 08:04  Patient On (Oxygen Delivery Method): room air        PHYSICAL EXAM:    NEURO  Exam: Baseline A&Ox3    RESPIRATORY  Exam: nonlabored breathing, on 2 L NC    CARDIOVASCULAR  Exam: RRR, on vaso 0.4    GI/NUTRITION  Exam: soft, midline incision covered w aquacel c/d/i, nondistended, appropraite ttp   Diet: NPO, NGT    GENITOURINARY/RENAL  Exam:  urine clear yellow  [x] Martinez catheter, indication: urine output monitoring in critically ill patient    HEMATOLOGIC  VTE Prophylaxis: SQH    ENDOCRINE  Glucose:  CAPILLARY BLOOD GLUCOSE      POCT Blood Glucose.: 140 mg/dL (23 Sep 2024 01:10)        PATIENT CARE ACCESS DEVICES:  [x ] Peripheral IV  [ ] Central Venous Line	[ ] R	[ ] L	[ ] IJ	[ ] Fem	[ ] SC	Placed:   [x ] Arterial Line		[ ] R	[x ] L	[ ] Fem	[x ] Rad	[ ] Ax	Placed: 9/23  [ ] PICC:					[ ] Mediport  [x ] Urinary Catheter, Date Placed: 9/23  [x] Necessity of urinary, arterial, and venous catheters discussed      I/Os:  I&O's Summary      --------------------------------------------------------------------------------------  MEDICATIONS:   Neurologic Medications    Respiratory Medications    Cardiovascular Medications    Gastrointestinal Medications    Genitourinary Medications    Hematologic/Oncologic Medications    Antimicrobial/Immunologic Medications    Endocrine/Metabolic Medications    Topical/Other Medications      --------------------------------------------------------------------------------------  LABS:                         13.2   18.61 )-----------( 263      ( 23 Sep 2024 01:55 )             38.9   09-23    133[L]  |  93[L]  |  18  ----------------------------<  165[H]  4.0   |  20[L]  |  0.76    Ca    10.0      23 Sep 2024 01:55  Phos  3.6     09-23  Mg     1.9     09-23    TPro  6.7  /  Alb  4.2  /  TBili  0.6  /  DBili  x   /  AST  16  /  ALT  12  /  AlkPhos  87  09-23  ABG - ( 23 Sep 2024 11:05 )  pH, Arterial: 7.32  pH, Blood: x     /  pCO2: 36    /  pO2: 219   / HCO3: 18    / Base Excess: -6.9  /  SaO2: 100.0           Urinalysis Basic - ( 23 Sep 2024 06:05 )    Color: Yellow / Appearance: Clear / SG: >1.030 / pH: x  Gluc: x / Ketone: >=160 mg/dL  / Bili: Negative / Urobili: 0.2 mg/dL   Blood: x / Protein: Trace mg/dL / Nitrite: Negative   Leuk Esterase: Negative / RBC: 2 /HPF / WBC 5 /HPF   Sq Epi: x / Non Sq Epi: 2 /HPF / Bacteria: Negative /HPF

## 2024-09-23 NOTE — ED PROVIDER NOTE - CARE PLAN
Principal Discharge DX:	Syncope   1 Principal Discharge DX:	Syncope  Secondary Diagnosis:	SBO (small bowel obstruction)

## 2024-09-23 NOTE — BRIEF OPERATIVE NOTE - OPERATION/FINDINGS
Diagnostic laparoscopy. Hemorrhagic ascites noted as well as frankly ischemic appearing small bowel, with poor ICG perfusion. Omental band noted to be causing obstruction, lysed with Ligasure. Conversion to open undertaken, small bowel ran from LoT to terminal ileum. 100cm frankly dead and malodorous bowel resected using EndoGIA 80mm x 2, Robb technique. Side to side stapled anastomosis. Mesenteric defect closed with 3-0 Vicryl. Hemostasis obtained. Fascia closed with #1 Maxon. Skin with staples. Diagnostic laparoscopy. Hemorrhagic ascites noted as well as frankly ischemic appearing small bowel, with poor ICG perfusion. Omental band noted to be causing obstruction, lysed with Ligasure. Conversion to open undertaken, small bowel ran from LoT to terminal ileum. 100cm frankly dead and malodorous jejunum resected using EndoGIA 80mm x 2, Robb technique. Side to side stapled anastomosis. Mesenteric defect closed with 3-0 Vicryl. Hemostasis obtained. Fascia closed with #1 Maxon. Skin with staples. Diagnostic laparoscopy. Hemorrhagic ascites noted  upon entry as well as frankly ischemic appearing small bowel, with poor ICG perfusion. Omental band noted to be causing obstruction, lysed with Ligasure. Conversion to open undertaken, small bowel ran from LoT to terminal ileum. 100cm frankly dead and malodorous jejunum resected using EndoGIA 80mm x 2, Robb technique. Side to side stapled anastomosis. Mesenteric defect closed with 3-0 Vicryl. Hemostasis obtained. Fascia closed with #1 Maxon. Skin with staples. Hemorrhagic ascites, ischemic appearing small bowel, omental band causing internal hernia.

## 2024-09-23 NOTE — H&P ADULT - ATTENDING COMMENTS
DATE OF SERVICE: 09-23-24 @ 09:21    78F with HTN here after syncopal episode. Developed abdominal pain that started suddenly with 1 episode of vomiting then had syncope. No past surgical history. Last BM yesterday.  VSS  WBC 18, lactate 2.9  CT with closed loop SBO, free fluid  Abd soft, mild lower abd ttp    NGT in place  To OR for dx lap, poss ex lap bowel rsxn  All questions answered, r/b/a explained

## 2024-09-23 NOTE — ED ADULT NURSE REASSESSMENT NOTE - NS ED NURSE REASSESS COMMENT FT1
NG tube placed, pt tolerating well, vitals WNL, family at the bedside. Pt states pain relief from decompression

## 2024-09-23 NOTE — ED PROVIDER NOTE - PROGRESS NOTE DETAILS
Attending Esha Hernandez MD: on reassessment, patient with continued abd pain   ordered for additional IVF, morphine   pending UA and CTAP Carrillo Kapoor MD (PGY2): Received signout from outgoing provider.  78-year-old female PMHx significant for HTN, syncope, presents to the ED after syncopal episode.  Patient also with generalized abdominal pain, N/V.  In ED labs significant for leukocytosis to 18.61 with CT findings suggestive of closed-loop SBO.  At this time, patient to be admitted to surgery.

## 2024-09-23 NOTE — H&P ADULT - HISTORY OF PRESENT ILLNESS
***Incomplete note***    Patient presenting with closed loop SBO, emergently to OR. Will update with complete HPI. Ms. Treadwell is a 78 year old woman with a history of lifestyle-controlled hypertension, hyperlipidemia on rosuvastatin, and symptomatic hyponatremia with past falls from dizziness now on salt tablets, presenting with acute abdominal pain that caused her to become lightheaded and fall down. She denies head strike but endorses brief loss of consciousness ("passed out"). She states that prior to this syncopal episode she developed intense mid-abdominal pain associated with nonbloody, nonbilious emesis. She denies any recent fevers, chills, chest pain, shortness of breath, melena, hematochezia, hematuria, dysuria, or oliguria. She last passed gas this morning and had a bowel movement last night.     In the ED she is hemodynamically stable and in no acute distress. Laboratory studies are notable for leukocytosis to 18.61 and elevated venous blood lactate to 2.7 CT demonstrates a closed loop small bowel obstruction.

## 2024-09-23 NOTE — H&P ADULT - NSHPSOCIALHISTORY_GEN_ALL_CORE
Former smoker, consumed 1 pack per day cigarettes for 7 years, stopped >30 years ago (7 pack-year total history). Drinks 2-6 glasses of wine per week. No recreational drug use. Lives alone in [place], formerly employed in banking as a .

## 2024-09-23 NOTE — ED ADULT NURSE REASSESSMENT NOTE - NS ED NURSE REASSESS COMMENT FT1
pt left to the OR, report given to DELIA Juarez in the OR. stable, vitals WNL except BP still running low but improved after fluids.,

## 2024-09-23 NOTE — ED ADULT NURSE REASSESSMENT NOTE - NS ED NURSE REASSESS COMMENT FT1
Covering RN: Pt c/o worsening abd pain rated 6/10 on pain scale. 4mg morphine given per MD order. Comfort and safety maintained at this time.

## 2024-09-23 NOTE — PATIENT PROFILE ADULT - FALL HARM RISK - HARM RISK INTERVENTIONS

## 2024-09-23 NOTE — H&P ADULT - NSHPLABSRESULTS_GEN_ALL_CORE
CBC Basic (09-23 @ 01:55)  WBC: 18.61 Hgb: 13.2 Hct: 38.9 Plt: 263    Metabolic Panel (09-23 @ 01:55)  133  |  93  |  18  ----------------------------<  165  4.0   |  20  |  0.76  Ca: 10.0/Phos: 3.6/Magnesium: 1.9    Liver Chemistries (09-23 @ 01:55)  Total protein 6.7 | Albumin 4.2  TBili 0.6 | DBili x  AST 16 | ALT 12 | AlkPhos 87    Urinalysis (09-23 @ 06:05)  Physical: Color Yellow, Appearance Clear, Mucous x, Gross blood Negative  Analytic: SG >1.030, pH 6.5  Cells: RBC 2, WBC 5  UTI markers: Bacteria Negative, Leukocyte esterase Negative, Nitrites Negative  Chemical: Glucose x, Ketones >=160, Protein Trace, Urobilinogen 0.2, Bilirubin Negative    Blood Gas Venous  (09-23 @ 06:37)  Lactate: 2.7    CT Abdomen and Pelvis w/ IV Cont (09-23 @ 05:21)  Dilated fluid-filled loops of small bowel bowel measuring up to 3 cm with a transition point in the mid lower pelvis (301:71) (602:41-42) consistent with obstruction. This has a closed loop appearance. There is prominent associated mesenteric edema as well as small volume ascites. There is mild bowel wall thickening suggesting possible ischemia. Sigmoid diverticulosis without diverticulitis. Appendix is normal.

## 2024-09-23 NOTE — ED PROVIDER NOTE - ATTENDING CONTRIBUTION TO CARE
79 yo F with PMHx of hypertension presents after a syncopal episode. The patient reports feeling lightheaded while she was standing up and having nausea and vomiting prior. She reports prior presentation with a syncopal episode that resolved a few months ago. Denies chest pain, dyspnea, fever, chills. She states she has abdominal pain as well but denies constipation, diarrhea, constipation     PE: well appearing, nontoxic, no respiratory distress.  Suprapubic tenderness with palpation. Neuro nonfocal.  Skin intact. Psych normal mood.    MDM: Differential diagnosis includes but is not limited to gastritis, cholelithiasis, cholecystitis, small bowel obstruction, diverticulitis, colitis, pancreatitis, POTS, orthostatic hypotension, ACS, dehydration, electrolyte abnormality     Will assess with labs, CTH, CTAP  Ordered for IVF, pepcid, zofran   Refer to progress notes for continued care

## 2024-09-23 NOTE — ED ADULT NURSE NOTE - RESPIRATORY ASSESSMENT
PT WALKED IN  WITH  A  DISABILITY FORM    FILLED OUT  AND  BEFORE SHE  TURNS  IT  IN  SHE IS  ASKING    ABOUT THE ANSWER FOR  QUESTION 7.  SHE   SHE STATED  HER STENOSIS  IS  A  LIFE TIME DISABILITY  AND THINKS THE  ANSWER  SHOULD  BE  YES.  PLS  CALL HER   [phone]    ( FORM  ON    DESK )     - - -

## 2024-09-23 NOTE — CHART NOTE - NSCHARTNOTEFT_GEN_A_CORE
POST-OP NOTE    BATOOL FRANCES | [id number] | [facility] 5SIC 13    Procedure: s/p ex lap SBR for closed loop SBO    Subjective: Patient seen and examined in SICU, recovering well.  She says her pain is well controlled and she feels a lot better than before the operation.    Pressors stopped shorty after OR, required some levophed intraoperatively.    Vital Signs Last 24 Hrs  T(C): 36.5 (23 Sep 2024 15:00), Max: 36.7 (23 Sep 2024 03:00)  T(F): 97.7 (23 Sep 2024 15:00), Max: 98 (23 Sep 2024 03:00)  HR: 97 (23 Sep 2024 16:00) (74 - 106)  BP: 130/58 (23 Sep 2024 12:00) (89/59 - 138/58)  BP(mean): 84 (23 Sep 2024 12:00) (64 - 84)  RR: 12 (23 Sep 2024 16:00) (11 - 22)  SpO2: 98% (23 Sep 2024 16:00) (95% - 100%)    Parameters below as of 23 Sep 2024 12:15  Patient On (Oxygen Delivery Method): room air      I&O's Summary    23 Sep 2024 07:01  -  23 Sep 2024 16:20  --------------------------------------------------------  IN: 1204.1 mL / OUT: 95 mL / NET: 1109.1 mL                            9.4    14.96 )-----------( 166      ( 23 Sep 2024 12:13 )             27.2     09-23    137  |  104  |  14  ----------------------------<  135[H]  3.7   |  19[L]  |  0.65    Ca    7.4[L]      23 Sep 2024 12:13  Phos  4.0     09-23  Mg     1.6     09-23    TPro  4.9[L]  /  Alb  3.6  /  TBili  0.3  /  DBili  x   /  AST  7[L]  /  ALT  9[L]  /  AlkPhos  43  09-23   PT/INR - ( 23 Sep 2024 12:13 )   PT: 12.7 sec;   INR: 1.16 ratio         PTT - ( 23 Sep 2024 12:13 )  PTT:28.0 sec    PHYSICAL EXAM:  Gen: NAD  Resp: breathing easily, no stridor  CV: RRR  Abdomen: soft, nontender, nondistended, aquacell dressing over midline incision c/d/i  Skin: Incision c/d/i. Normal color, no rashes or lesions      A:   78 year old woman presenting with a closed loop small bowel obstruction s/p ex lap sbr on 9/23/24.    Plan:  - NGT, christopher  - IVF  - Pain control - tylenol, NSAIDs, opiates for breakthrough  - OOBAT  - DVT ppx  - Appreciate SICU recs

## 2024-09-23 NOTE — CONSULT NOTE ADULT - ASSESSMENT
ASSESSMENT:  78y F PMH HTN, syncope presented to ED s/p syncopal episode, - HS/LOC, no AC. Prior to syncope, patient had acute onset abdominal pain and NBNB emesis. In ED, AVSS, mild hypotension SBP mid90s. WBC 18.6. VBG lactate 2.7. CT A/P concerning for closed loop SBO. Surgery consulted and patient was taken emergently to OR.   Intra-operatively patient requiring vasopressor support, unable to be weaned. SICU consulted for post operative care requiring vasopressor support and ventilator management.      PLAN:    NEURO:  - Baseline A&Ox3  - Pain control: IV tylenol    RESPIRATORY:   - unable to be extubated post operatively  -   - Maintain SAO2>92%    CARDIOVASCULAR:  - MAP goal >65      GI/NUTRITION:  - NPO, NGT    GENITOURINARY/RENAL:  - Martinez placed pre-operatiely  - Monitor I/Os  - Trend BUN/Cr daily  - Trend electrolytes, replete PRN    HEMATOLOGIC:  - Trend CBC, Coags daily  - VTE ppx: SCDs,     INFECTIOUS DISEASE:  - Trend WBC, monitor for signs of infection    ENDOCRINE:  - Montior glucose levels    LINES/TUBES/DRAINS:  - Martinez    DISPO:  - SICU    May Mehta, PGY2  SICU [id number] ASSESSMENT:  78y F PMH HTN, syncope presented to ED s/p syncopal episode, - HS/LOC, no AC. Prior to syncope, patient had acute onset abdominal pain and NBNB emesis. In ED, AVSS, mild hypotension SBP mid90s. WBC 18.6. VBG lactate 2.7. CT A/P concerning for closed loop SBO. Surgery consulted and patient was taken emergently to OR.   Intra-operatively patient requiring vasopressor support, unable to be weaned. SICU consulted for post operative care requiring vasopressor support and ventilator management.    PLAN:    NEURO:  - Baseline A&Ox3  - Pain control: IV tylenol    RESPIRATORY:   - nasal cannula, wean as tolerated  - Maintain SAO2>92%    CARDIOVASCULAR:  - vasopressin 0.4, wean as tolerated  - MAP goal >65    GI/NUTRITION:  - NPO, NGT  - monitor bowel function    GENITOURINARY/RENAL:  - Maritnez placed pre-operatiely  - Monitor I/Os  - Trend BUN/Cr daily  - Trend electrolytes, replete PRN    HEMATOLOGIC:  - Trend CBC, Coags daily  - VTE ppx: SCDs, SQH    INFECTIOUS DISEASE:  - Trend WBC, monitor for signs of infection    ENDOCRINE:  - Montior glucose levels    LINES/TUBES/DRAINS:  - Martinez  - NGT  - L radial A line    DISPO:  - SICU    May Mehta, PGY2  SICU [id number] ASSESSMENT:  78y F PMH HTN, syncope presented to ED s/p syncopal episode, - HS/LOC, no AC. Prior to syncope, patient had acute onset abdominal pain and NBNB emesis. In ED, AVSS, mild hypotension SBP mid90s. WBC 18.6. VBG lactate 2.7. CT A/P concerning for closed loop SBO. Surgery consulted and patient was taken emergently to OR.   Intra-operatively patient requiring vasopressor support, unable to be weaned. SICU consulted for post operative care requiring vasopressor support and ventilator management.    PLAN:    NEURO:  - Baseline A&Ox3  - Pain control: IV tylenol    RESPIRATORY:   - nasal cannula, wean as tolerated  - Maintain SAO2>92%    CARDIOVASCULAR:  - vasopressin 0.4, wean as tolerated  - MAP goal >65    GI/NUTRITION:  - NPO, NGT  - monitor bowel function    GENITOURINARY/RENAL:  - Martinez placed pre-operatiely  - Monitor I/Os  - Trend BUN/Cr daily  - Trend electrolytes, replete PRN    HEMATOLOGIC:  - Trend CBC, Coags daily  - VTE ppx: SCDs, Lovenox    INFECTIOUS DISEASE:  - Trend WBC, monitor for signs of infection    ENDOCRINE:  - Montior glucose levels    LINES/TUBES/DRAINS:  - Martinez  - NGT  - L radial A line    DISPO:  - SICU    May Mehta, PGY2  SICU [id number] ASSESSMENT:  78y F PMH HTN, syncope presented to ED s/p syncopal episode, - HS/LOC, no AC. Prior to syncope, patient had acute onset abdominal pain and NBNB emesis. In ED, AVSS, mild hypotension SBP mid90s. WBC 18.6. VBG lactate 2.7. CT A/P concerning for closed loop SBO. Surgery consulted and patient was taken emergently to OR.   Intra-operatively patient requiring vasopressor support, unable to be weaned. SICU consulted for post operative care requiring vasopressor support and ventilator management.    PLAN:    NEURO:  - Baseline A&Ox3  - Pain control: IV tylenol, PRN dilaudid    RESPIRATORY:   - nasal cannula, wean as tolerated  - Maintain SAO2>92%    CARDIOVASCULAR:  - levophed 0.04, wean as tolerated  - MAP goal >65    GI/NUTRITION:  - NPO, NGT  - monitor bowel function    GENITOURINARY/RENAL:  - Martinez placed pre-operatiely  - Monitor I/Os  - Trend BUN/Cr daily  - Trend electrolytes, replete PRN    HEMATOLOGIC:  - Trend CBC, Coags daily  - VTE ppx: SCDs, Lovenox    INFECTIOUS DISEASE:  - Trend WBC, monitor for signs of infection    ENDOCRINE:  - Montior glucose levels    LINES/TUBES/DRAINS:  - Martinez  - NGT  - L radial A line    DISPO:  - SICU    May Mehta, PGY2  SICU [id number]

## 2024-09-23 NOTE — ED PROVIDER NOTE - PHYSICAL EXAMINATION
Gen: NAD, non-toxic appearing  Head: normal appearing  HEENT: normal conjunctiva, oral mucosa dry, no midline tenderness c/t/l, normal eom, perrla   Lung: no respiratory distress, speaking in full sentences, CTA b/l     CV: regular rate and rhythm, no murmurs  Abd: soft, non distended, non tender, neg cva   MSK: no visible deformities  Neuro: No focal deficits, AAOx3  Skin: Warm  Psych: normal affect Gen: NAD, non-toxic appearing  Head: normal appearing  HEENT: normal conjunctiva, oral mucosa dry, no midline tenderness c/t/l, normal eom, perrla   Lung: no respiratory distress, speaking in full sentences, CTA b/l     CV: regular rate and rhythm, no murmurs  Abd: soft, non distended, diffuse abd tender, no rebounding or guarding, neg cva   MSK: no visible deformities  Neuro: No focal deficits, AAOx3  Skin: Warm  Psych: normal affect

## 2024-09-23 NOTE — ED PROVIDER NOTE - CLINICAL SUMMARY MEDICAL DECISION MAKING FREE TEXT BOX
Afebrile medically stable female presents to ED s/p syncopal episode.  Noted head strike with LOC.  Not on AC.  Associated NBNB emesis with abdominal pain.  Cranial and neurologically intact.  No focal deficits.  Normal EOM and PERRLA.  Soft nondistended nontender abdomen.  EKG NSR with no ischemic changes.  Ordered basic labs, TSH, mag/Phos, troponin, CXR, CT head to rule out ACS versus electrolyte abnormality versus thyroid disease versus ICH/fracture.  Given fluids, pain meds, Zofran, reassess. Afebrile medically stable female presents to ED s/p syncopal episode.  Noted head strike with LOC.  Not on AC.  Associated NBNB emesis with abdominal pain.  Cranial and neurologically intact.  No focal deficits.  Normal EOM and PERRLA.  Soft nondistended diffusely tender abdomen.  EKG NSR with no ischemic changes.  Ordered basic labs, TSH, mag/Phos, troponin, CXR, CT head to rule out ACS versus electrolyte abnormality versus thyroid disease versus ICH/fracture.  Given fluids, pain meds, Zofran, reassess.

## 2024-09-23 NOTE — H&P ADULT - ASSESSMENT
Closed loop SBO, emergently to OR. Full note to follow.     Case discussed with attending surgeon Dr. Albert Glass.     Shiraz Hung, PGY-2  General Surgery - Red Team ([id number]) Ms. Treadwell is an otherwise healthy 78 year old woman presenting with a closed loop small bowel obstruction requiring emergent operative intervention.     Plan  - Admit to Dr. Albert Glass for emergent diagnostic laparoscopy, possible small bowel resection, possible exploratory laparotomy.   - NPO, maintenance intravenous crystalloid.   - DVT prophylaxis to be initiated post-operatively.   - Medication reconciliation completed.   - Pain control as needed post-operatively with acetaminophen, NSAIDs, opiates for breakthrough.   - Patient's primary care physician, Dr. Johnston, updated via phone.   - Consent obtained and documented in chart.     Case discussed with attending surgeon Dr. Albert Glass.     Shiraz Hung, PGY-2  General Surgery - Red Team ([id number])

## 2024-09-24 LAB
ANION GAP SERPL CALC-SCNC: 8 MMOL/L — SIGNIFICANT CHANGE UP (ref 5–17)
BUN SERPL-MCNC: 10 MG/DL — SIGNIFICANT CHANGE UP (ref 7–23)
CALCIUM SERPL-MCNC: 8.4 MG/DL — SIGNIFICANT CHANGE UP (ref 8.4–10.5)
CHLORIDE SERPL-SCNC: 101 MMOL/L — SIGNIFICANT CHANGE UP (ref 96–108)
CO2 SERPL-SCNC: 25 MMOL/L — SIGNIFICANT CHANGE UP (ref 22–31)
CREAT SERPL-MCNC: 0.65 MG/DL — SIGNIFICANT CHANGE UP (ref 0.5–1.3)
CULTURE RESULTS: SIGNIFICANT CHANGE UP
EGFR: 90 ML/MIN/1.73M2 — SIGNIFICANT CHANGE UP
GLUCOSE SERPL-MCNC: 114 MG/DL — HIGH (ref 70–99)
HCT VFR BLD CALC: 24.6 % — LOW (ref 34.5–45)
HCT VFR BLD CALC: 26.5 % — LOW (ref 34.5–45)
HGB BLD-MCNC: 8.5 G/DL — LOW (ref 11.5–15.5)
HGB BLD-MCNC: 9 G/DL — LOW (ref 11.5–15.5)
MAGNESIUM SERPL-MCNC: 2.4 MG/DL — SIGNIFICANT CHANGE UP (ref 1.6–2.6)
MCHC RBC-ENTMCNC: 30.7 PG — SIGNIFICANT CHANGE UP (ref 27–34)
MCHC RBC-ENTMCNC: 30.9 PG — SIGNIFICANT CHANGE UP (ref 27–34)
MCHC RBC-ENTMCNC: 34 GM/DL — SIGNIFICANT CHANGE UP (ref 32–36)
MCHC RBC-ENTMCNC: 34.6 GM/DL — SIGNIFICANT CHANGE UP (ref 32–36)
MCV RBC AUTO: 89.5 FL — SIGNIFICANT CHANGE UP (ref 80–100)
MCV RBC AUTO: 90.4 FL — SIGNIFICANT CHANGE UP (ref 80–100)
NRBC # BLD: 0 /100 WBCS — SIGNIFICANT CHANGE UP (ref 0–0)
NRBC # BLD: 0 /100 WBCS — SIGNIFICANT CHANGE UP (ref 0–0)
PHOSPHATE SERPL-MCNC: 3.1 MG/DL — SIGNIFICANT CHANGE UP (ref 2.5–4.5)
PLATELET # BLD AUTO: 149 K/UL — LOW (ref 150–400)
PLATELET # BLD AUTO: 159 K/UL — SIGNIFICANT CHANGE UP (ref 150–400)
POTASSIUM SERPL-MCNC: 3.9 MMOL/L — SIGNIFICANT CHANGE UP (ref 3.5–5.3)
POTASSIUM SERPL-SCNC: 3.9 MMOL/L — SIGNIFICANT CHANGE UP (ref 3.5–5.3)
RBC # BLD: 2.75 M/UL — LOW (ref 3.8–5.2)
RBC # BLD: 2.93 M/UL — LOW (ref 3.8–5.2)
RBC # FLD: 12.1 % — SIGNIFICANT CHANGE UP (ref 10.3–14.5)
RBC # FLD: 12.3 % — SIGNIFICANT CHANGE UP (ref 10.3–14.5)
SODIUM SERPL-SCNC: 134 MMOL/L — LOW (ref 135–145)
SPECIMEN SOURCE: SIGNIFICANT CHANGE UP
WBC # BLD: 11.78 K/UL — HIGH (ref 3.8–10.5)
WBC # BLD: 12.91 K/UL — HIGH (ref 3.8–10.5)
WBC # FLD AUTO: 11.78 K/UL — HIGH (ref 3.8–10.5)
WBC # FLD AUTO: 12.91 K/UL — HIGH (ref 3.8–10.5)

## 2024-09-24 PROCEDURE — 93010 ELECTROCARDIOGRAM REPORT: CPT

## 2024-09-24 RX ORDER — SODIUM CHLORIDE IRRIG SOLUTION 0.9 %
500 SOLUTION, IRRIGATION IRRIGATION ONCE
Refills: 0 | Status: COMPLETED | OUTPATIENT
Start: 2024-09-24 | End: 2024-09-24

## 2024-09-24 RX ADMIN — Medication 1000 MILLIGRAM(S): at 05:50

## 2024-09-24 RX ADMIN — Medication 500 MILLILITER(S): at 21:12

## 2024-09-24 RX ADMIN — Medication 1000 MILLIGRAM(S): at 12:30

## 2024-09-24 RX ADMIN — Medication 400 MILLIGRAM(S): at 05:20

## 2024-09-24 RX ADMIN — PIPERACILLIN SODIUM AND TAZOBACTAM SODIUM 25 GRAM(S): 12; 1.5 INJECTION, POWDER, LYOPHILIZED, FOR SOLUTION INTRAVENOUS at 05:20

## 2024-09-24 RX ADMIN — Medication 1000 MILLIGRAM(S): at 00:00

## 2024-09-24 RX ADMIN — PIPERACILLIN SODIUM AND TAZOBACTAM SODIUM 25 GRAM(S): 12; 1.5 INJECTION, POWDER, LYOPHILIZED, FOR SOLUTION INTRAVENOUS at 00:01

## 2024-09-24 RX ADMIN — PIPERACILLIN SODIUM AND TAZOBACTAM SODIUM 25 GRAM(S): 12; 1.5 INJECTION, POWDER, LYOPHILIZED, FOR SOLUTION INTRAVENOUS at 22:57

## 2024-09-24 RX ADMIN — Medication 400 MILLIGRAM(S): at 11:28

## 2024-09-24 RX ADMIN — PIPERACILLIN SODIUM AND TAZOBACTAM SODIUM 25 GRAM(S): 12; 1.5 INJECTION, POWDER, LYOPHILIZED, FOR SOLUTION INTRAVENOUS at 13:29

## 2024-09-24 RX ADMIN — ENOXAPARIN SODIUM 40 MILLIGRAM(S): 150 INJECTION SUBCUTANEOUS at 18:04

## 2024-09-24 NOTE — OCCUPATIONAL THERAPY INITIAL EVALUATION ADULT - GENERAL OBSERVATIONS, REHAB EVAL
Upon entry, patient seated in chair at bedside, +IV +NGT +pulse ox patient agreeable to OT eval, cleared for OT evaluation as per RN.

## 2024-09-24 NOTE — PHYSICAL THERAPY INITIAL EVALUATION ADULT - ADDITIONAL COMMENTS
Pt lives alone is an apartment with 5 steps to enter +B/L handrails, no stairs inside. Pt was independent with ambulation and ADL's.

## 2024-09-24 NOTE — OCCUPATIONAL THERAPY INITIAL EVALUATION ADULT - ADDITIONAL COMMENTS
Pt lives alone in a apartment, 5 steps to enter +HR and main level set up. As per patient, independent with ADLs prior to admission, ambulated independently with no assistive devices. Denies owning DME.

## 2024-09-24 NOTE — PHYSICAL THERAPY INITIAL EVALUATION ADULT - PERTINENT HX OF CURRENT PROBLEM, REHAB EVAL
78 year old woman with a history of lifestyle-controlled hypertension, hyperlipidemia on rosuvastatin, and symptomatic hyponatremia with past falls from dizziness now on salt tablets, presenting with acute abdominal pain that caused her to become lightheaded and fall down. She denies head strike but endorses brief loss of consciousness ("passed out"). She states that prior to this syncopal episode she developed intense mid-abdominal pain associated with nonbloody, nonbilious emesis. She denies any recent fevers, chills, chest pain, shortness of breath, melena, hematochezia, hematuria, dysuria, or oliguria. She last passed gas this morning and had a bowel movement last night. Now s/p ex lap SBR for closed loop SBO. Surgery started laparoscopically. Found to have omental band causing SBO. Band lysed laparoscopically, but converted to open for findings of >100 cm ischemic bowel (9/23). Xray Chest (9/23): Clear lungs. Enteric tube descends below level of diaphragm and is coiled over the stomach. CT Head (9/23): No acute intracranial hemorrhage, mass effect, or midline shift. CT Abdomen and Pelvis (9/23): Small bowel obstruction with a closed loop appearance as described above. Surgical consultation is recommended.

## 2024-09-24 NOTE — PROGRESS NOTE ADULT - ASSESSMENT
78 year old woman presenting with a closed loop small bowel obstruction s/p ex lap sbr on 9/23/24, required levophed intraop, stopped shortly after in SICU and was downgraded to the floor    Plan:  - Con't NGT, christopher  - IVF  - Pain control - tylenol, NSAIDs, opiates for breakthrough  - OOBAT  - DVT ppx  - f/u PT    Red surgery [id number]

## 2024-09-24 NOTE — PROGRESS NOTE ADULT - SUBJECTIVE AND OBJECTIVE BOX
SURGERY DAILY PROGRESS NOTE:     SUBJECTIVE/ROS: Patient seen and examined. Patient feels well, "not much pain" this morning, denies flatus or BM  Denies nausea, vomiting, chest pain, shortness of breath     MEDICATIONS  (STANDING):  acetaminophen   IVPB .. 1000 milliGRAM(s) IV Intermittent every 6 hours  chlorhexidine 2% Cloths 1 Application(s) Topical <User Schedule>  enoxaparin Injectable 40 milliGRAM(s) SubCutaneous every 24 hours  influenza  Vaccine (HIGH DOSE) 0.5 milliLiter(s) IntraMuscular once  multiple electrolytes Injection Type 1 1000 milliLiter(s) (40 mL/Hr) IV Continuous <Continuous>  piperacillin/tazobactam IVPB.. 3.375 Gram(s) IV Intermittent every 8 hours    MEDICATIONS  (PRN):  HYDROmorphone  Injectable 0.25 milliGRAM(s) IV Push every 3 hours PRN Moderate to Severe Pain      OBJECTIVE:  Vital Signs Last 24 Hrs  T(C): 36.4 (24 Sep 2024 04:45), Max: 36.9 (23 Sep 2024 22:00)  T(F): 97.6 (24 Sep 2024 04:45), Max: 98.4 (23 Sep 2024 22:00)  HR: 107 (24 Sep 2024 04:45) (72 - 107)  BP: 128/74 (24 Sep 2024 04:45) (96/50 - 138/58)  BP(mean): 71 (24 Sep 2024 01:00) (64 - 84)  RR: 18 (24 Sep 2024 04:45) (9 - 22)  SpO2: 99% (24 Sep 2024 04:45) (95% - 100%)    Parameters below as of 24 Sep 2024 04:45  Patient On (Oxygen Delivery Method): room air      I&O's Detail    23 Sep 2024 07:01  -  24 Sep 2024 07:00  --------------------------------------------------------  IN:    Enteral Tube Flush: 30 mL    IV PiggyBack: 300 mL    IV PiggyBack: 100 mL    IV PiggyBack: 400 mL    multiple electrolytes Injection Type 1 Bolus: 1000 mL    multiple electrolytes Injection Type 1.: 1035 mL    Norepinephrine: 4.1 mL  Total IN: 2869.1 mL    OUT:    Indwelling Catheter - Urethral (mL): 1105 mL    Nasogastric/Oral tube (mL): 450 mL    Voided (mL): 400 mL  Total OUT: 1955 mL    Total NET: 914.1 mL        Daily     Daily     LABS:                        8.5    11.78 )-----------( 149      ( 24 Sep 2024 07:40 )             24.6     09-24    134[L]  |  101  |  10  ----------------------------<  114[H]  3.9   |  25  |  0.65    Ca    8.4      24 Sep 2024 07:40  Phos  3.1     09-24  Mg     2.4     09-24    TPro  4.9[L]  /  Alb  3.5  /  TBili  0.6  /  DBili  x   /  AST  10  /  ALT  7[L]  /  AlkPhos  41  09-23    PT/INR - ( 23 Sep 2024 12:13 )   PT: 12.7 sec;   INR: 1.16 ratio         PTT - ( 23 Sep 2024 12:13 )  PTT:28.0 sec  Urinalysis Basic - ( 24 Sep 2024 07:40 )    Color: x / Appearance: x / SG: x / pH: x  Gluc: 114 mg/dL / Ketone: x  / Bili: x / Urobili: x   Blood: x / Protein: x / Nitrite: x   Leuk Esterase: x / RBC: x / WBC x   Sq Epi: x / Non Sq Epi: x / Bacteria: x            PHYSICAL EXAM:  Constitutional: well developed, well nourished, NAD  Respiratory: non labored breathing noted, NGT in place  Gastrointestinal: abdomen soft, nontender, nondistended. No obvious masses. No peritonitis, incision site CDI

## 2024-09-24 NOTE — OCCUPATIONAL THERAPY INITIAL EVALUATION ADULT - PERTINENT HX OF CURRENT PROBLEM, REHAB EVAL
Ms. Treadwell is a 78 year old woman with a history of lifestyle-controlled hypertension, hyperlipidemia on rosuvastatin, and symptomatic hyponatremia with past falls from dizziness now on salt tablets, presenting with acute abdominal pain that caused her to become lightheaded and fall down. She denies head strike but endorses brief loss of consciousness ("passed out"). She states that prior to this syncopal episode she developed intense mid-abdominal pain associated with nonbloody, nonbilious emesis. She denies any recent fevers, chills, chest pain, shortness of breath, melena, hematochezia, hematuria, dysuria, or oliguria. She last passed gas this morning and had a bowel movement last night.   Now s/p presenting with a closed loop small bowel obstruction s/p ex lap sbr on 9/23/24, required levophed intraop, stopped shortly after in SICU and was downgraded to the floor  In the ED she is hemodynamically stable and in no acute distress. Laboratory studies are notable for leukocytosis to 18.61 and elevated venous blood lactate to 2.7 CT demonstrates a closed loop small bowel obstruction.     CT Head: No acute intracranial hemorrhage, mass effect, or midline shift.

## 2024-09-25 DIAGNOSIS — I47.10 SUPRAVENTRICULAR TACHYCARDIA, UNSPECIFIED: ICD-10-CM

## 2024-09-25 LAB
ANION GAP SERPL CALC-SCNC: 10 MMOL/L — SIGNIFICANT CHANGE UP (ref 5–17)
BUN SERPL-MCNC: 11 MG/DL — SIGNIFICANT CHANGE UP (ref 7–23)
CALCIUM SERPL-MCNC: 8.5 MG/DL — SIGNIFICANT CHANGE UP (ref 8.4–10.5)
CHLORIDE SERPL-SCNC: 102 MMOL/L — SIGNIFICANT CHANGE UP (ref 96–108)
CO2 SERPL-SCNC: 25 MMOL/L — SIGNIFICANT CHANGE UP (ref 22–31)
CREAT SERPL-MCNC: 0.61 MG/DL — SIGNIFICANT CHANGE UP (ref 0.5–1.3)
EGFR: 91 ML/MIN/1.73M2 — SIGNIFICANT CHANGE UP
GLUCOSE SERPL-MCNC: 71 MG/DL — SIGNIFICANT CHANGE UP (ref 70–99)
HCT VFR BLD CALC: 23.7 % — LOW (ref 34.5–45)
HCT VFR BLD CALC: 26.6 % — LOW (ref 34.5–45)
HGB BLD-MCNC: 8.1 G/DL — LOW (ref 11.5–15.5)
HGB BLD-MCNC: 9 G/DL — LOW (ref 11.5–15.5)
MAGNESIUM SERPL-MCNC: 2.1 MG/DL — SIGNIFICANT CHANGE UP (ref 1.6–2.6)
MCHC RBC-ENTMCNC: 31.2 PG — SIGNIFICANT CHANGE UP (ref 27–34)
MCHC RBC-ENTMCNC: 31.4 PG — SIGNIFICANT CHANGE UP (ref 27–34)
MCHC RBC-ENTMCNC: 33.8 GM/DL — SIGNIFICANT CHANGE UP (ref 32–36)
MCHC RBC-ENTMCNC: 34.2 GM/DL — SIGNIFICANT CHANGE UP (ref 32–36)
MCV RBC AUTO: 91.2 FL — SIGNIFICANT CHANGE UP (ref 80–100)
MCV RBC AUTO: 92.7 FL — SIGNIFICANT CHANGE UP (ref 80–100)
NRBC # BLD: 0 /100 WBCS — SIGNIFICANT CHANGE UP (ref 0–0)
NRBC # BLD: 0 /100 WBCS — SIGNIFICANT CHANGE UP (ref 0–0)
PHOSPHATE SERPL-MCNC: 1.7 MG/DL — LOW (ref 2.5–4.5)
PLATELET # BLD AUTO: 153 K/UL — SIGNIFICANT CHANGE UP (ref 150–400)
PLATELET # BLD AUTO: 175 K/UL — SIGNIFICANT CHANGE UP (ref 150–400)
POTASSIUM SERPL-MCNC: 3.5 MMOL/L — SIGNIFICANT CHANGE UP (ref 3.5–5.3)
POTASSIUM SERPL-SCNC: 3.5 MMOL/L — SIGNIFICANT CHANGE UP (ref 3.5–5.3)
RBC # BLD: 2.6 M/UL — LOW (ref 3.8–5.2)
RBC # BLD: 2.87 M/UL — LOW (ref 3.8–5.2)
RBC # FLD: 12.2 % — SIGNIFICANT CHANGE UP (ref 10.3–14.5)
RBC # FLD: 12.3 % — SIGNIFICANT CHANGE UP (ref 10.3–14.5)
SODIUM SERPL-SCNC: 137 MMOL/L — SIGNIFICANT CHANGE UP (ref 135–145)
WBC # BLD: 10.44 K/UL — SIGNIFICANT CHANGE UP (ref 3.8–10.5)
WBC # BLD: 15.08 K/UL — HIGH (ref 3.8–10.5)
WBC # FLD AUTO: 10.44 K/UL — SIGNIFICANT CHANGE UP (ref 3.8–10.5)
WBC # FLD AUTO: 15.08 K/UL — HIGH (ref 3.8–10.5)

## 2024-09-25 PROCEDURE — 93010 ELECTROCARDIOGRAM REPORT: CPT

## 2024-09-25 PROCEDURE — 71045 X-RAY EXAM CHEST 1 VIEW: CPT | Mod: 26

## 2024-09-25 PROCEDURE — 99223 1ST HOSP IP/OBS HIGH 75: CPT

## 2024-09-25 RX ORDER — METOPROLOL TARTRATE 50 MG
5 TABLET ORAL EVERY 6 HOURS
Refills: 0 | Status: DISCONTINUED | OUTPATIENT
Start: 2024-09-25 | End: 2024-09-26

## 2024-09-25 RX ORDER — METOPROLOL TARTRATE 50 MG
5 TABLET ORAL EVERY 6 HOURS
Refills: 0 | Status: DISCONTINUED | OUTPATIENT
Start: 2024-09-25 | End: 2024-09-25

## 2024-09-25 RX ORDER — POTASSIUM PHOSPHATE, MONOBASIC POTASSIUM PHOSPHATE, DIBASIC 224; 236 MG/ML; MG/ML
30 INJECTION, SOLUTION, CONCENTRATE INTRAVENOUS ONCE
Refills: 0 | Status: COMPLETED | OUTPATIENT
Start: 2024-09-25 | End: 2024-09-25

## 2024-09-25 RX ORDER — ACETAMINOPHEN 325 MG
1000 TABLET ORAL EVERY 6 HOURS
Refills: 0 | Status: COMPLETED | OUTPATIENT
Start: 2024-09-25 | End: 2024-09-26

## 2024-09-25 RX ADMIN — POTASSIUM PHOSPHATE, MONOBASIC POTASSIUM PHOSPHATE, DIBASIC 83.33 MILLIMOLE(S): 224; 236 INJECTION, SOLUTION, CONCENTRATE INTRAVENOUS at 10:08

## 2024-09-25 RX ADMIN — Medication 40 MILLILITER(S): at 10:08

## 2024-09-25 RX ADMIN — ENOXAPARIN SODIUM 40 MILLIGRAM(S): 150 INJECTION SUBCUTANEOUS at 17:31

## 2024-09-25 RX ADMIN — Medication 5 MILLIGRAM(S): at 17:32

## 2024-09-25 RX ADMIN — Medication 5 MILLIGRAM(S): at 23:53

## 2024-09-25 RX ADMIN — Medication 1000 MILLIGRAM(S): at 18:01

## 2024-09-25 RX ADMIN — Medication 400 MILLIGRAM(S): at 17:31

## 2024-09-25 RX ADMIN — Medication 400 MILLIGRAM(S): at 23:53

## 2024-09-25 RX ADMIN — CHLORHEXIDINE GLUCONATE ORAL RINSE 1 APPLICATION(S): 1.2 SOLUTION DENTAL at 08:31

## 2024-09-25 NOTE — PROGRESS NOTE ADULT - ASSESSMENT
78 year old woman with a history of lifestyle-controlled HTN, HLD on rosuvastatin, and symptomatic hyponatremia with past falls from dizziness now on salt tablets, presenting with acute abdominal pain/emesis that caused her to become lightheaded and fall down. She denies head strike but endorses brief loss of consciousness ("passed out"). She states that prior to this syncopal episode she developed intense mid-abdominal pain associated with nonbloody, nonbilious emesis. CT demonstrates a closed loop small bowel obstruction and now s/p exploratory lap. Pt with tachycardia and palpitations today, ECG noted to be SVT. EP consulted for further input.     #SBO s/p ex lap 9/23  #paroxysmal AT  #HLD    - tele here with sinus tach and intermittent paroxysmal long RP tachycardia. Rates in sinus up to ~110s, AT up to ~150s. Pt with fluttering in sinus tach and with AT. Potentially with intermittent episodes at home as pt describes a "fluttering" sensation but exacerbated with acute SBO.  - Would trial low dose Metoprolol 25mg once daily.   - Would discharge pt with 30 day event monitor to record burden. Order placed in [employer]. Please call [phone] on discharge for placement (please call Mon-Frid [phone] for placement).   - Briefly discussed potential outpatient ablation if still persistent and pt symptomatic.   - Keep on tele. Keep K>4, Mg>2  - Discussed with EP attending and primary team.

## 2024-09-25 NOTE — PROGRESS NOTE ADULT - SUBJECTIVE AND OBJECTIVE BOX
POD #: 9/23: ex lap, SBR for close loop obstruction    Overnight Events:  NGT dislodged and replaced, patient tachy 100-110, given 500cc bolus, says she was anxious from the beeping in her room per night team    SUBJECTIVE:  Reports pain is minimal and controlled.  Denies nausea, vomiting  No gas or bowel movements yet  Ambulating independently    OBJECTIVE:  Vital Signs Last 24 Hrs  T(C): 37.2 (25 Sep 2024 04:55), Max: 37.2 (25 Sep 2024 04:55)  T(F): 99 (25 Sep 2024 04:55), Max: 99 (25 Sep 2024 04:55)  HR: 93 (25 Sep 2024 04:55) (68 - 114)  BP: 138/75 (25 Sep 2024 04:55) (123/72 - 146/76)  BP(mean): --  RR: 18 (25 Sep 2024 04:55) (17 - 18)  SpO2: 96% (25 Sep 2024 04:55) (95% - 100%)    Parameters below as of 25 Sep 2024 04:55  Patient On (Oxygen Delivery Method): room air        Physical Examination:  GEN: NAD, resting quietly, NGT in place  NEURO: AAOx3, CN II-XII grossly intact, no focal deficits  PULM: symmetric chest rise bilaterally, no increased WOB  ABD: soft, nontender, nondistended, incision CDI  EXTR: no lower extremity edema, moving all extremities

## 2024-09-25 NOTE — DIETITIAN INITIAL EVALUATION ADULT - PERTINENT MEDS FT
MEDICATIONS  (STANDING):  acetaminophen   IVPB .. 1000 milliGRAM(s) IV Intermittent every 6 hours  chlorhexidine 2% Cloths 1 Application(s) Topical <User Schedule>  enoxaparin Injectable 40 milliGRAM(s) SubCutaneous every 24 hours  influenza  Vaccine (HIGH DOSE) 0.5 milliLiter(s) IntraMuscular once  metoprolol tartrate Injectable 5 milliGRAM(s) IV Push every 6 hours  multiple electrolytes Injection Type 1 1000 milliLiter(s) (40 mL/Hr) IV Continuous <Continuous>    MEDICATIONS  (PRN):  HYDROmorphone  Injectable 0.25 milliGRAM(s) IV Push every 3 hours PRN Moderate to Severe Pain

## 2024-09-25 NOTE — DIETITIAN INITIAL EVALUATION ADULT - OTHER INFO
-- GI: ex lap, SBR for close loop obstruction 9/23, NGT for LCS in place upon visit   -- on IVF Plasmalyte @ 40ml/hr  -- Noted hypophosphatemia s/p repletion today  -- GI: ex lap, SBR for close loop obstruction 9/23, NGT for LWS in place upon visit   -- on IVF Plasmalyte @ 40ml/hr  -- Noted hypophosphatemia s/p repletion today

## 2024-09-25 NOTE — DIETITIAN INITIAL EVALUATION ADULT - PHYSCIAL ASSESSMENT
Drug Dosing Weight  Height (cm): 157.5 (23 Sep 2024 09:05)  Weight (kg): 54.4 (23 Sep 2024 09:05)  BMI (kg/m2): 21.9 (23 Sep 2024 09:05)    Daily weight (standing Weight in kG): 57.1 (09-25 @ 12:42)    Weight history:   Per pt: ~120-125lb usually, denies history of weight loss prior to admission   Previous RD notes: no history   [facility] HIE: 54.9kg (8/13/24), 54kg (5/9/24), 56.7kg (11/15/23), 57.6kg (11/9/22)      ** no significant weight changes between pt's stated weight and weight in house. RD will continue to monitor wt trends as available/able.     IBW: 110lb, 115% IBW

## 2024-09-25 NOTE — PROGRESS NOTE ADULT - ASSESSMENT
78 year old woman presenting with a closed loop small bowel obstruction s/p ex lap sbr on 9/23/24, required levophed intraop, stopped shortly after in SICU and was downgraded to the floor    Plan:  - Con't NGT, christopher  - IVF  - Pain control - tylenol, NSAIDs, opiates for breakthrough  - OOBAT  - DVT ppx  - f/u PT  - Monitor H/H  - Monitor vitals    Red Team Surgery   [id number], [phone]

## 2024-09-25 NOTE — DIETITIAN INITIAL EVALUATION ADULT - PERTINENT LABORATORY DATA
09-25    137  |  102  |  11  ----------------------------<  71  3.5   |  25  |  0.61    Ca    8.5      25 Sep 2024 07:18  Phos  1.7     09-25  Mg     2.1     09-25    TPro  4.9[L]  /  Alb  3.5  /  TBili  0.6  /  DBili  x   /  AST  10  /  ALT  7[L]  /  AlkPhos  41  09-23

## 2024-09-25 NOTE — DIETITIAN INITIAL EVALUATION ADULT - NSFNSGIIOFT_GEN_A_CORE
09-24-24 @ 07:01  -  09-25-24 @ 07:00  --------------------------------------------------------  OUT:    Nasogastric/Oral tube (mL): 300 mL  Total OUT: 300 mL    Total NET: -300 mL      09-25-24 @ 07:01  -  09-25-24 @ 16:14  --------------------------------------------------------  OUT:    Nasogastric/Oral tube (mL): 50 mL  Total OUT: 50 mL    Total NET: -50 mL

## 2024-09-25 NOTE — DIETITIAN INITIAL EVALUATION ADULT - EDUCATION DIETARY MODIFICATIONS
Discussed diet advancement per team. Provided low-fiber nutrition therapy including importance of avoiding  fiber rich foods, fresh fruits/vegetables, whole grains, and added fiber in processed foods. Discussed chewing foods well and adequate hydration and protein intake. Discussed gradual reintroduction of fiber back into diet once cleared by MD. Pt verbalized understanding and accepted written handout. Patient with no nutrition-related questions at this time. Made aware RD remains available as needed./(1) partially meets; needs review/practice/verbalization

## 2024-09-25 NOTE — DIETITIAN INITIAL EVALUATION ADULT - ADD RECOMMEND
-- Recommend MVI, pending no medical contraindications, for micronutrient support.   -- Monitor PO intake, GI tolerance, skin integrity, labs, weight, and bowel movement regularity.

## 2024-09-25 NOTE — DIETITIAN INITIAL EVALUATION ADULT - CONTINUE CURRENT NUTRITION CARE PLAN
Medical team to advance diet when medically feasible. Consider advancing to clear liquid, followed by low fiber diet as tolerated. If NPO status > 7 days, consider alternate means of nutrition if within pt/family GOC.

## 2024-09-25 NOTE — DIETITIAN INITIAL EVALUATION ADULT - SIGNS/SYMPTOMS
pt asking for diet-related question and receptive to diet education on low fiber diet  s/p ex lap SBR, day 3 NPO

## 2024-09-25 NOTE — DIETITIAN INITIAL EVALUATION ADULT - ORAL INTAKE PTA/DIET HISTORY
Pt reports normal PO intake at home, usually eat 3 meals daily, reports eating less with aging. Pt reports eating eating well-balanced meals, not on therapeutic restriction. Confirms no known food allergies. Denies chewing or swallowing issues. Denies GI distress until the day of admission as pt with abdominal pain and emesis. Denies protein supplement use, on Vitamin C, Vitamin D and Culturelle per pt.

## 2024-09-25 NOTE — PROGRESS NOTE ADULT - SUBJECTIVE AND OBJECTIVE BOX
HISTORY OF PRESENT ILLNESS:  78 year old woman with a history of lifestyle-controlled hypertension, hyperlipidemia on rosuvastatin, and symptomatic hyponatremia with past falls from dizziness now on salt tablets, presenting with acute abdominal pain that caused her to become lightheaded and fall down. She denies head strike but endorses brief loss of consciousness ("passed out"). She states that prior to this syncopal episode she developed intense mid-abdominal pain associated with nonbloody, nonbilious emesis. She denies any recent fevers, chills, chest pain, shortness of breath, melena, hematochezia, hematuria, dysuria, or oliguria. She last passed gas this morning and had a bowel movement last night.     In the ED she is hemodynamically stable and in no acute distress. Laboratory studies are notable for leukocytosis to 18.61 and elevated venous blood lactate to 2.7 CT demonstrates a closed loop small bowel obstruction and now s/p exploratory lap. Required levophed intraop, stopped shortly after in SICU and was downgraded to the floor. Pt with tachycardia and palpitations today, ECG noted to be SVT. EP consulted for further input.     Pt reports intermittent.       Allergies    No Known Allergies    Intolerances    	    MEDICATIONS:  enoxaparin Injectable 40 milliGRAM(s) SubCutaneous every 24 hours  metoprolol tartrate Injectable 5 milliGRAM(s) IV Push every 6 hours        HYDROmorphone  Injectable 0.25 milliGRAM(s) IV Push every 3 hours PRN        chlorhexidine 2% Cloths 1 Application(s) Topical <User Schedule>  influenza  Vaccine (HIGH DOSE) 0.5 milliLiter(s) IntraMuscular once  multiple electrolytes Injection Type 1 1000 milliLiter(s) IV Continuous <Continuous>      PAST MEDICAL & SURGICAL HISTORY:      FAMILY HISTORY:      SOCIAL HISTORY:    [ ] Non-smoker  [ ] Smoker  [ ] Alcohol      REVIEW OF SYSTEMS:  See HPI. Otherwise, 12 point ROS done and otherwise negative.    PHYSICAL EXAM:  T(C): 36.7 (09-25-24 @ 12:42), Max: 37.2 (09-25-24 @ 04:55)  HR: 103 (09-25-24 @ 12:42) (92 - 160)  BP: 125/72 (09-25-24 @ 12:42) (124/66 - 150/86)  RR: 18 (09-25-24 @ 12:42) (18 - 20)  SpO2: 97% (09-25-24 @ 12:42) (95% - 98%)  Wt(kg): --  I&O's Summary    24 Sep 2024 07:01  -  25 Sep 2024 07:00  --------------------------------------------------------  IN: 480 mL / OUT: 1000 mL / NET: -520 mL    25 Sep 2024 07:01  -  25 Sep 2024 15:32  --------------------------------------------------------  IN: 640 mL / OUT: 750 mL / NET: -110 mL        Appearance: Normal	  HEENT:   Normal oral mucosa, PERRL, EOMI	  Lymphatic: No lymphadenopathy  Cardiovascular: Normal S1 S2, No JVD, No murmurs, No edema  Respiratory: Lungs clear to auscultation	  Psychiatry: A & O x 3, Mood & affect appropriate  Gastrointestinal:  Soft, Non-tender, + BS	  Skin: No rashes, No ecchymoses, No cyanosis	  Neurologic: Non-focal  Extremities: Normal range of motion, No clubbing, cyanosis or edema  Vascular: Peripheral pulses palpable 2+ bilaterally        LABS:	 	    CBC Full  -  ( 25 Sep 2024 13:40 )  WBC Count : 15.08 K/uL  Hemoglobin : 9.0 g/dL  Hematocrit : 26.6 %  Platelet Count - Automated : 175 K/uL  Mean Cell Volume : 92.7 fl  Mean Cell Hemoglobin : 31.4 pg  Mean Cell Hemoglobin Concentration : 33.8 gm/dL  Auto Neutrophil # : x  Auto Lymphocyte # : x  Auto Monocyte # : x  Auto Eosinophil # : x  Auto Basophil # : x  Auto Neutrophil % : x  Auto Lymphocyte % : x  Auto Monocyte % : x  Auto Eosinophil % : x  Auto Basophil % : x    09-25    137  |  102  |  11  ----------------------------<  71  3.5   |  25  |  0.61  09-24    134[L]  |  101  |  10  ----------------------------<  114[H]  3.9   |  25  |  0.65    Ca    8.5      25 Sep 2024 07:18  Ca    8.4      24 Sep 2024 07:40  Phos  1.7     09-25  Phos  3.1     09-24  Mg     2.1     09-25  Mg     2.4     09-24    TPro  4.9[L]  /  Alb  3.5  /  TBili  0.6  /  DBili  x   /  AST  10  /  ALT  7[L]  /  AlkPhos  41  09-23      proBNP:   Lipid Profile:   HgA1c:   TSH:       CARDIAC MARKERS:                TELEMETRY: 	    ECG:  	  RADIOLOGY:  OTHER: 	    PREVIOUS DIAGNOSTIC TESTING:    [ ] Echocardiogram:  [ ]  Catheterization:  [ ] Stress Test:  	  	  ASSESSMENT/PLAN: 	     HISTORY OF PRESENT ILLNESS:  78 year old woman with a history of lifestyle-controlled hypertension, hyperlipidemia on rosuvastatin, and symptomatic hyponatremia with past falls from dizziness now on salt tablets, presenting with acute abdominal pain that caused her to become lightheaded and fall down. She denies head strike but endorses brief loss of consciousness ("passed out"). She states that prior to this syncopal episode she developed intense mid-abdominal pain associated with nonbloody, nonbilious emesis. She denies any recent fevers, chills, chest pain, shortness of breath, melena, hematochezia, hematuria, dysuria, or oliguria. She last passed gas this morning and had a bowel movement last night.     In the ED she is hemodynamically stable and in no acute distress. Laboratory studies are notable for leukocytosis to 18.61 and elevated venous blood lactate to 2.7 CT demonstrates a closed loop small bowel obstruction and now s/p exploratory lap. Required levophed intraop, stopped shortly after in SICU and was downgraded to the floor. Pt with tachycardia and palpitations today, ECG noted to be SVT. EP consulted for further input.     Pt reports intermittent fluttering during the event, has been having interrmitent brief episodes <1 min in duration at home within the last couple weeks. She confirms that she did not have any similar symptoms prior to her syncopal episode at home.       Allergies    No Known Allergies    Intolerances    	    MEDICATIONS:  enoxaparin Injectable 40 milliGRAM(s) SubCutaneous every 24 hours  metoprolol tartrate Injectable 5 milliGRAM(s) IV Push every 6 hours        HYDROmorphone  Injectable 0.25 milliGRAM(s) IV Push every 3 hours PRN        chlorhexidine 2% Cloths 1 Application(s) Topical <User Schedule>  influenza  Vaccine (HIGH DOSE) 0.5 milliLiter(s) IntraMuscular once  multiple electrolytes Injection Type 1 1000 milliLiter(s) IV Continuous <Continuous>      PAST MEDICAL & SURGICAL HISTORY:      FAMILY HISTORY:      SOCIAL HISTORY:    [ ] Non-smoker  [ ] Smoker  [ ] Alcohol      REVIEW OF SYSTEMS:  See HPI. Otherwise, 12 point ROS done and otherwise negative.    PHYSICAL EXAM:  T(C): 36.7 (09-25-24 @ 12:42), Max: 37.2 (09-25-24 @ 04:55)  HR: 103 (09-25-24 @ 12:42) (92 - 160)  BP: 125/72 (09-25-24 @ 12:42) (124/66 - 150/86)  RR: 18 (09-25-24 @ 12:42) (18 - 20)  SpO2: 97% (09-25-24 @ 12:42) (95% - 98%)  Wt(kg): --  I&O's Summary    24 Sep 2024 07:01  -  25 Sep 2024 07:00  --------------------------------------------------------  IN: 480 mL / OUT: 1000 mL / NET: -520 mL    25 Sep 2024 07:01  -  25 Sep 2024 15:32  --------------------------------------------------------  IN: 640 mL / OUT: 750 mL / NET: -110 mL        Appearance: Normal	  HEENT:   Normal oral mucosa, PERRL, EOMI	  Lymphatic: No lymphadenopathy  Cardiovascular: Normal S1 S2, No JVD, No murmurs, No edema  Respiratory: Lungs clear to auscultation	  Psychiatry: A & O x 3, Mood & affect appropriate  Gastrointestinal:  Soft, Non-tender, + BS	  Skin: No rashes, No ecchymoses, No cyanosis	  Neurologic: Non-focal  Extremities: Normal range of motion, No clubbing, cyanosis or edema  Vascular: Peripheral pulses palpable 2+ bilaterally        LABS:	 	    CBC Full  -  ( 25 Sep 2024 13:40 )  WBC Count : 15.08 K/uL  Hemoglobin : 9.0 g/dL  Hematocrit : 26.6 %  Platelet Count - Automated : 175 K/uL  Mean Cell Volume : 92.7 fl  Mean Cell Hemoglobin : 31.4 pg  Mean Cell Hemoglobin Concentration : 33.8 gm/dL  Auto Neutrophil # : x  Auto Lymphocyte # : x  Auto Monocyte # : x  Auto Eosinophil # : x  Auto Basophil # : x  Auto Neutrophil % : x  Auto Lymphocyte % : x  Auto Monocyte % : x  Auto Eosinophil % : x  Auto Basophil % : x    09-25    137  |  102  |  11  ----------------------------<  71  3.5   |  25  |  0.61  09-24    134[L]  |  101  |  10  ----------------------------<  114[H]  3.9   |  25  |  0.65    Ca    8.5      25 Sep 2024 07:18  Ca    8.4      24 Sep 2024 07:40  Phos  1.7     09-25  Phos  3.1     09-24  Mg     2.1     09-25  Mg     2.4     09-24    TPro  4.9[L]  /  Alb  3.5  /  TBili  0.6  /  DBili  x   /  AST  10  /  ALT  7[L]  /  AlkPhos  41  09-23      proBNP:   Lipid Profile:   HgA1c:   TSH:       CARDIAC MARKERS:                TELEMETRY: 	    ECG:  	  RADIOLOGY:  OTHER: 	    PREVIOUS DIAGNOSTIC TESTING:    [ ] Echocardiogram:  [ ]  Catheterization:  [ ] Stress Test:  	  	  ASSESSMENT/PLAN: 	     HISTORY OF PRESENT ILLNESS:  78 year old woman with a history of lifestyle-controlled hypertension, hyperlipidemia on rosuvastatin, and symptomatic hyponatremia with past falls from dizziness now on salt tablets, presenting with acute abdominal pain that caused her to become lightheaded and fall down. She denies head strike but endorses brief loss of consciousness ("passed out"). She states that prior to this syncopal episode she developed intense mid-abdominal pain associated with nonbloody, nonbilious emesis. She denies any recent fevers, chills, chest pain, shortness of breath, melena, hematochezia, hematuria, dysuria, or oliguria. She last passed gas this morning and had a bowel movement last night.     In the ED she is hemodynamically stable and in no acute distress. Laboratory studies are notable for leukocytosis to 18.61 and elevated venous blood lactate to 2.7 CT demonstrates a closed loop small bowel obstruction and now s/p exploratory lap. Required levophed intraop, stopped shortly after in SICU and was downgraded to the floor. Pt with tachycardia and palpitations today, ECG noted to be SVT. EP consulted for further input.     Pt reports intermittent fluttering during the event, has been having interrmitent brief episodes <1 min in duration at home within the last couple weeks. She confirms that she did not have any similar symptoms prior to her syncopal episode at home.       Allergies  No Known Allergies  Intolerances      MEDICATIONS:  enoxaparin Injectable 40 milliGRAM(s) SubCutaneous every 24 hours  metoprolol tartrate Injectable 5 milliGRAM(s) IV Push every 6 hours  HYDROmorphone  Injectable 0.25 milliGRAM(s) IV Push every 3 hours PRN  chlorhexidine 2% Cloths 1 Application(s) Topical <User Schedule>  influenza  Vaccine (HIGH DOSE) 0.5 milliLiter(s) IntraMuscular once  multiple electrolytes Injection Type 1 1000 milliLiter(s) IV Continuous <Continuous>    PAST MEDICAL & SURGICAL HISTORY:    REVIEW OF SYSTEMS:  See HPI. Otherwise, 12 point ROS done and otherwise negative.    PHYSICAL EXAM:  T(C): 36.7 (09-25-24 @ 12:42), Max: 37.2 (09-25-24 @ 04:55)  HR: 103 (09-25-24 @ 12:42) (92 - 160)  BP: 125/72 (09-25-24 @ 12:42) (124/66 - 150/86)  RR: 18 (09-25-24 @ 12:42) (18 - 20)  SpO2: 97% (09-25-24 @ 12:42) (95% - 98%)  Wt(kg): --  I&O's Summary    24 Sep 2024 07:01  -  25 Sep 2024 07:00  --------------------------------------------------------  IN: 480 mL / OUT: 1000 mL / NET: -520 mL    25 Sep 2024 07:01  -  25 Sep 2024 15:32  --------------------------------------------------------  IN: 640 mL / OUT: 750 mL / NET: -110 mL      Appearance: Normal	  HEENT: NC/AT  Cardiovascular: tachycardic  Respiratory: Lungs clear to auscultation	  Psychiatry: A & O x 3, Mood & affect appropriate  Neurologic: Non-focal  Extremities: No BLE edema    LABS:	 	    CBC Full  -  ( 25 Sep 2024 13:40 )  WBC Count : 15.08 K/uL  Hemoglobin : 9.0 g/dL  Hematocrit : 26.6 %  Platelet Count - Automated : 175 K/uL  Mean Cell Volume : 92.7 fl  Mean Cell Hemoglobin : 31.4 pg  Mean Cell Hemoglobin Concentration : 33.8 gm/dL    09-25    137  |  102  |  11  ----------------------------<  71  3.5   |  25  |  0.61  09-24    134[L]  |  101  |  10  ----------------------------<  114[H]  3.9   |  25  |  0.65    Ca    8.5      25 Sep 2024 07:18  Ca    8.4      24 Sep 2024 07:40  Phos  1.7     09-25  Phos  3.1     09-24  Mg     2.1     09-25  Mg     2.4     09-24    TPro  4.9[L]  /  Alb  3.5  /  TBili  0.6  /  DBili  x   /  AST  10  /  ALT  7[L]  /  AlkPhos  41  09-23    TSH: Thyroid Stimulating Hormone, Serum (09.23.24 @ 01:55)    Thyroid Stimulating Hormone, Serum: 3.28 uIU/mL    CARDIAC MARKERS:Troponin T, High Sensitivity (09.23.24 @ 03:53)    Troponin T, High Sensitivity Result: <6: *  *  Rapid upward or downward changes in high-sensitivity troponin levels  suggest acute myocardial injury. Renal impairment may cause sustained  troponin elevations.  Normal: <6 - 14 ng/L  Indeterminate: 15-51 ng/L  Elevated: > 51 ng/L  See [email or web address]/test/[email or web address] on the [facility] intranet for more  information ng/L    RADIOLOGY:< from: Xray Chest 1 View- PORTABLE-Urgent (Xray Chest 1 View- PORTABLE-Urgent .) (09.25.24 @ 03:29) >    FINDINGS:  Enteric tube with tip in the stomach.  Upper abdominal skin staples.    The heart size is normal.  The lungs are clear.  There is no pneumothorax or pleural effusion.    IMPRESSION:  Enteric tube with tip in the stomach.    < end of copied text >

## 2024-09-25 NOTE — DIETITIAN INITIAL EVALUATION ADULT - ETIOLOGY
decreased ability to consume adequate protein-energy in setting of GI distress  lack of exposure to diet education

## 2024-09-26 LAB
ANION GAP SERPL CALC-SCNC: 18 MMOL/L — HIGH (ref 5–17)
BUN SERPL-MCNC: 11 MG/DL — SIGNIFICANT CHANGE UP (ref 7–23)
CALCIUM SERPL-MCNC: 8.4 MG/DL — SIGNIFICANT CHANGE UP (ref 8.4–10.5)
CHLORIDE SERPL-SCNC: 97 MMOL/L — SIGNIFICANT CHANGE UP (ref 96–108)
CO2 SERPL-SCNC: 19 MMOL/L — LOW (ref 22–31)
CREAT SERPL-MCNC: 0.45 MG/DL — LOW (ref 0.5–1.3)
EGFR: 98 ML/MIN/1.73M2 — SIGNIFICANT CHANGE UP
GLUCOSE SERPL-MCNC: 71 MG/DL — SIGNIFICANT CHANGE UP (ref 70–99)
HCT VFR BLD CALC: 24.1 % — LOW (ref 34.5–45)
HCT VFR BLD CALC: 24.1 % — LOW (ref 34.5–45)
HGB BLD-MCNC: 8.1 G/DL — LOW (ref 11.5–15.5)
HGB BLD-MCNC: 8.2 G/DL — LOW (ref 11.5–15.5)
MAGNESIUM SERPL-MCNC: 2 MG/DL — SIGNIFICANT CHANGE UP (ref 1.6–2.6)
MCHC RBC-ENTMCNC: 30.9 PG — SIGNIFICANT CHANGE UP (ref 27–34)
MCHC RBC-ENTMCNC: 31.3 PG — SIGNIFICANT CHANGE UP (ref 27–34)
MCHC RBC-ENTMCNC: 33.6 GM/DL — SIGNIFICANT CHANGE UP (ref 32–36)
MCHC RBC-ENTMCNC: 34 GM/DL — SIGNIFICANT CHANGE UP (ref 32–36)
MCV RBC AUTO: 92 FL — SIGNIFICANT CHANGE UP (ref 80–100)
MCV RBC AUTO: 92 FL — SIGNIFICANT CHANGE UP (ref 80–100)
NRBC # BLD: 0 /100 WBCS — SIGNIFICANT CHANGE UP (ref 0–0)
NRBC # BLD: 0 /100 WBCS — SIGNIFICANT CHANGE UP (ref 0–0)
PHOSPHATE SERPL-MCNC: 2.5 MG/DL — SIGNIFICANT CHANGE UP (ref 2.5–4.5)
PLATELET # BLD AUTO: 176 K/UL — SIGNIFICANT CHANGE UP (ref 150–400)
PLATELET # BLD AUTO: 191 K/UL — SIGNIFICANT CHANGE UP (ref 150–400)
POTASSIUM SERPL-MCNC: 3.1 MMOL/L — LOW (ref 3.5–5.3)
POTASSIUM SERPL-SCNC: 3.1 MMOL/L — LOW (ref 3.5–5.3)
RBC # BLD: 2.62 M/UL — LOW (ref 3.8–5.2)
RBC # BLD: 2.62 M/UL — LOW (ref 3.8–5.2)
RBC # FLD: 12.1 % — SIGNIFICANT CHANGE UP (ref 10.3–14.5)
RBC # FLD: 12.2 % — SIGNIFICANT CHANGE UP (ref 10.3–14.5)
SODIUM SERPL-SCNC: 134 MMOL/L — LOW (ref 135–145)
WBC # BLD: 10.24 K/UL — SIGNIFICANT CHANGE UP (ref 3.8–10.5)
WBC # BLD: 11.07 K/UL — HIGH (ref 3.8–10.5)
WBC # FLD AUTO: 10.24 K/UL — SIGNIFICANT CHANGE UP (ref 3.8–10.5)
WBC # FLD AUTO: 11.07 K/UL — HIGH (ref 3.8–10.5)

## 2024-09-26 PROCEDURE — 99232 SBSQ HOSP IP/OBS MODERATE 35: CPT

## 2024-09-26 RX ORDER — METOPROLOL TARTRATE 50 MG
7.5 TABLET ORAL EVERY 6 HOURS
Refills: 0 | Status: DISCONTINUED | OUTPATIENT
Start: 2024-09-26 | End: 2024-09-29

## 2024-09-26 RX ORDER — POTASSIUM PHOSPHATE, MONOBASIC POTASSIUM PHOSPHATE, DIBASIC 224; 236 MG/ML; MG/ML
30 INJECTION, SOLUTION, CONCENTRATE INTRAVENOUS ONCE
Refills: 0 | Status: COMPLETED | OUTPATIENT
Start: 2024-09-26 | End: 2024-09-26

## 2024-09-26 RX ADMIN — POTASSIUM PHOSPHATE, MONOBASIC POTASSIUM PHOSPHATE, DIBASIC 83.33 MILLIMOLE(S): 224; 236 INJECTION, SOLUTION, CONCENTRATE INTRAVENOUS at 12:36

## 2024-09-26 RX ADMIN — Medication 5 MILLIGRAM(S): at 12:21

## 2024-09-26 RX ADMIN — Medication 7.5 MILLIGRAM(S): at 17:43

## 2024-09-26 RX ADMIN — Medication 5 MILLIGRAM(S): at 05:18

## 2024-09-26 RX ADMIN — CHLORHEXIDINE GLUCONATE ORAL RINSE 1 APPLICATION(S): 1.2 SOLUTION DENTAL at 06:15

## 2024-09-26 RX ADMIN — Medication 400 MILLIGRAM(S): at 12:21

## 2024-09-26 RX ADMIN — Medication 400 MILLIGRAM(S): at 05:18

## 2024-09-26 RX ADMIN — ENOXAPARIN SODIUM 40 MILLIGRAM(S): 150 INJECTION SUBCUTANEOUS at 17:42

## 2024-09-26 RX ADMIN — Medication 40 MILLILITER(S): at 12:36

## 2024-09-26 NOTE — PROGRESS NOTE ADULT - SUBJECTIVE AND OBJECTIVE BOX
24H hour events:     MEDICATIONS:  enoxaparin Injectable 40 milliGRAM(s) SubCutaneous every 24 hours  metoprolol tartrate Injectable 5 milliGRAM(s) IV Push every 6 hours        acetaminophen   IVPB .. 1000 milliGRAM(s) IV Intermittent every 6 hours  HYDROmorphone  Injectable 0.25 milliGRAM(s) IV Push every 3 hours PRN        chlorhexidine 2% Cloths 1 Application(s) Topical <User Schedule>  influenza  Vaccine (HIGH DOSE) 0.5 milliLiter(s) IntraMuscular once  multiple electrolytes Injection Type 1 1000 milliLiter(s) IV Continuous <Continuous>      REVIEW OF SYSTEMS:  Complete 12 point ROS negative.    PHYSICAL EXAM:  T(C): 36.7 (09-26-24 @ 06:12), Max: 37.1 (09-25-24 @ 09:29)  HR: 73 (09-26-24 @ 06:12) (73 - 160)  BP: 134/79 (09-26-24 @ 06:12) (124/71 - 150/86)  RR: 18 (09-26-24 @ 06:12) (18 - 20)  SpO2: 97% (09-26-24 @ 06:12) (95% - 97%)  Wt(kg): --  I&O's Summary    25 Sep 2024 07:01  -  26 Sep 2024 07:00  --------------------------------------------------------  IN: 1640 mL / OUT: 1250 mL / NET: 390 mL        Appearance: Normal	  HEENT: PERRL, EOMI	  Cardiovascular: Normal S1 S2, No JVD, No murmurs  Respiratory: Lungs clear to auscultation	  Psychiatry: A & O x 3, Mood & affect appropriate  Gastrointestinal:  Soft, Non-tender, + BS	  Skin: No rashes, No ecchymoses, No cyanosis	  Neurologic: Grossly intact  Extremities: No clubbing, cyanosis or edema  Vascular: Peripheral pulses palpable 2+ bilaterally        LABS:	 	    CBC Full  -  ( 26 Sep 2024 06:48 )  WBC Count : 10.24 K/uL  Hemoglobin : 8.1 g/dL  Hematocrit : 24.1 %  Platelet Count - Automated : 176 K/uL  Mean Cell Volume : 92.0 fl  Mean Cell Hemoglobin : 30.9 pg  Mean Cell Hemoglobin Concentration : 33.6 gm/dL  Auto Neutrophil # : x  Auto Lymphocyte # : x  Auto Monocyte # : x  Auto Eosinophil # : x  Auto Basophil # : x  Auto Neutrophil % : x  Auto Lymphocyte % : x  Auto Monocyte % : x  Auto Eosinophil % : x  Auto Basophil % : x    09-26    134[L]  |  97  |  11  ----------------------------<  71  3.1[L]   |  19[L]  |  0.45[L]  09-25    137  |  102  |  11  ----------------------------<  71  3.5   |  25  |  0.61    Ca    8.4      26 Sep 2024 06:51  Ca    8.5      25 Sep 2024 07:18  Phos  2.5     09-26  Phos  1.7     09-25  Mg     2.0     09-26  Mg     2.1     09-25        proBNP:   Lipid Profile:   HgA1c:   TSH:       CARDIAC MARKERS:          TELEMETRY: 	    ECG:  	  RADIOLOGY:  OTHER: 	    PREVIOUS DIAGNOSTIC TESTING:    [ ] Echocardiogram:    [ ]  Catheterization:  [ ] Stress Test:  	  	  ASSESSMENT/PLAN: 	     24H hour events: Pt reports slight nausea this morning, no acute events overnight, Tele: SR 80-90's, had brief run of PAT up to 120's this morning around 0733 (pt asymptomatic).        MEDICATIONS:  enoxaparin Injectable 40 milliGRAM(s) SubCutaneous every 24 hours  metoprolol tartrate Injectable 5 milliGRAM(s) IV Push every 6 hours  acetaminophen   IVPB .. 1000 milliGRAM(s) IV Intermittent every 6 hours  HYDROmorphone  Injectable 0.25 milliGRAM(s) IV Push every 3 hours PRN  chlorhexidine 2% Cloths 1 Application(s) Topical <User Schedule>  influenza  Vaccine (HIGH DOSE) 0.5 milliLiter(s) IntraMuscular once  multiple electrolytes Injection Type 1 1000 milliLiter(s) IV Continuous <Continuous>    REVIEW OF SYSTEMS:  Complete 12 point ROS negative.    PHYSICAL EXAM:  T(C): 36.7 (09-26-24 @ 06:12), Max: 37.1 (09-25-24 @ 09:29)  HR: 73 (09-26-24 @ 06:12) (73 - 160)  BP: 134/79 (09-26-24 @ 06:12) (124/71 - 150/86)  RR: 18 (09-26-24 @ 06:12) (18 - 20)  SpO2: 97% (09-26-24 @ 06:12) (95% - 97%)  Wt(kg): --  I&O's Summary    25 Sep 2024 07:01  -  26 Sep 2024 07:00  --------------------------------------------------------  IN: 1640 mL / OUT: 1250 mL / NET: 390 mL        Appearance: NAD, OOB sitting up in chair 	  HEENT: PERRL, EOMI, + NG tube	  Cardiovascular: Normal S1 S2, No JVD, No murmurs  Respiratory: Lungs clear to auscultation	  Psychiatry: A & O x 3, Mood & affect appropriate  Gastrointestinal: Soft, Non-tender, + BS	  Skin: No rashes, No ecchymoses, No cyanosis	  Neurologic: Grossly intact  Extremities: No clubbing, cyanosis or edema  Vascular: Peripheral pulses palpable 2+ bilaterally        LABS:	 	    CBC Full  -  ( 26 Sep 2024 06:48 )  WBC Count : 10.24 K/uL  Hemoglobin : 8.1 g/dL  Hematocrit : 24.1 %  Platelet Count - Automated : 176 K/uL  Mean Cell Volume : 92.0 fl  Mean Cell Hemoglobin : 30.9 pg  Mean Cell Hemoglobin Concentration : 33.6 gm/dL  Auto Neutrophil # : x  Auto Lymphocyte # : x  Auto Monocyte # : x  Auto Eosinophil # : x  Auto Basophil # : x  Auto Neutrophil % : x  Auto Lymphocyte % : x  Auto Monocyte % : x  Auto Eosinophil % : x  Auto Basophil % : x    09-26    134[L]  |  97  |  11  ----------------------------<  71  3.1[L]   |  19[L]  |  0.45[L]  09-25    137  |  102  |  11  ----------------------------<  71  3.5   |  25  |  0.61    Ca    8.4      26 Sep 2024 06:51  Ca    8.5      25 Sep 2024 07:18  Phos  2.5     09-26  Phos  1.7     09-25  Mg     2.0     09-26  Mg     2.1     09-25    TSH: 3.28        TELEMETRY: SR 80-90's, had brief run of PAT up to 120's this morning around 0733 (pt asymptomatic).     	     24H hour events: Pt reports slight nausea this morning, no acute events overnight, Tele: SR 80-90's, had brief run of PAT up to 120's this morning around 0733 (pt asymptomatic).        MEDICATIONS:  enoxaparin Injectable 40 milliGRAM(s) SubCutaneous every 24 hours  metoprolol tartrate Injectable 5 milliGRAM(s) IV Push every 6 hours  acetaminophen   IVPB .. 1000 milliGRAM(s) IV Intermittent every 6 hours  HYDROmorphone  Injectable 0.25 milliGRAM(s) IV Push every 3 hours PRN  chlorhexidine 2% Cloths 1 Application(s) Topical <User Schedule>  influenza  Vaccine (HIGH DOSE) 0.5 milliLiter(s) IntraMuscular once  multiple electrolytes Injection Type 1 1000 milliLiter(s) IV Continuous <Continuous>    REVIEW OF SYSTEMS:  Complete 12 point ROS negative.    PHYSICAL EXAM:  T(C): 36.7 (09-26-24 @ 06:12), Max: 37.1 (09-25-24 @ 09:29)  HR: 73 (09-26-24 @ 06:12) (73 - 160)  BP: 134/79 (09-26-24 @ 06:12) (124/71 - 150/86)  RR: 18 (09-26-24 @ 06:12) (18 - 20)  SpO2: 97% (09-26-24 @ 06:12) (95% - 97%)  Wt(kg): --  I&O's Summary    25 Sep 2024 07:01  -  26 Sep 2024 07:00  --------------------------------------------------------  IN: 1640 mL / OUT: 1250 mL / NET: 390 mL        Appearance: NAD, OOB sitting up in chair 	  HEENT: PERRL, EOMI, + NG tube	  Cardiovascular: Normal S1 S2, No JVD, No murmurs  Respiratory: Lungs clear to auscultation	  Psychiatry: A & O x 3, Mood & affect appropriate  Gastrointestinal: Soft, Non-tender, + BS	  Skin: No rashes, No ecchymoses, No cyanosis	  Neurologic: Grossly intact  Extremities: No clubbing, cyanosis or edema  Vascular: Peripheral pulses palpable 2+ bilaterally        LABS:	 	    CBC Full  -  ( 26 Sep 2024 06:48 )  WBC Count : 10.24 K/uL  Hemoglobin : 8.1 g/dL  Hematocrit : 24.1 %  Platelet Count - Automated : 176 K/uL  Mean Cell Volume : 92.0 fl  Mean Cell Hemoglobin : 30.9 pg  Mean Cell Hemoglobin Concentration : 33.6 gm/dL    09-26    134[L]  |  97  |  11  ----------------------------<  71  3.1[L]   |  19[L]  |  0.45[L]  09-25    137  |  102  |  11  ----------------------------<  71  3.5   |  25  |  0.61    Ca    8.4      26 Sep 2024 06:51  Ca    8.5      25 Sep 2024 07:18  Phos  2.5     09-26  Phos  1.7     09-25  Mg     2.0     09-26  Mg     2.1     09-25    TSH: 3.28    TELEMETRY: SR 80-90's, had brief run of PAT up to 120's this morning around 0733 (pt asymptomatic).

## 2024-09-26 NOTE — PROGRESS NOTE ADULT - ASSESSMENT
78 year old woman presenting with a closed loop small bowel obstruction s/p ex lap sbr on 9/23/24, required levophed intraop, stopped shortly after in SICU and was downgraded to the floor    Plan:  - Con't NGT, christopher  - IVF  - Pain control - tylenol, NSAIDs, opiates for breakthrough  - OOBAT  - DVT ppx  - Monitor H/H  - Monitor vitals on tele  - BB for SVT

## 2024-09-26 NOTE — CONSULT NOTE ADULT - SUBJECTIVE AND OBJECTIVE BOX
Patient is a 78 year old woman with a PMHx of lifestyle-controlled hypertension, hyperlipidemia on rosuvastatin, and symptomatic hyponatremia with past falls from dizziness now on salt tablets, who presented to [facility] with a chief complaint of acute abdominal pain that reportedly caused her to become lightheaded and fall down. She denies head strike however reports brief episode of loss of consciousness ("passed out"). She states that prior to this syncopal episode she developed intense mid-abdominal pain associated with nonbloody, nonbilious emesis. Patient was found to have an SBO and is now S/P ex lap small bowel resection on 9/23/24, requiring levophed intraoperatively and SICU stay. Internal Medicine has been consulted on Ms. Treadwell's care for medical management.         REVIEW OF SYSTEMS:    CONSTITUTIONAL: Generalized weakness, No fevers or chills  EYES/ENT: No visual changes;  No vertigo or throat pain   NECK: No pain or stiffness  RESPIRATORY: No cough, wheezing, hemoptysis; No shortness of breath  CARDIOVASCULAR: No chest pain or palpitations  GASTROINTESTINAL: + S/P OR; NGT with bilious output; + Diarrhea    GENITOURINARY: No dysuria, frequency or hematuria  NEUROLOGICAL: No numbness or weakness  SKIN: No itching, burning, rashes, or lesions   All other review of systems is negative unless indicated above.    MEDICATIONS:  MEDICATIONS  (STANDING):  chlorhexidine 2% Cloths 1 Application(s) Topical <User Schedule>  enoxaparin Injectable 40 milliGRAM(s) SubCutaneous every 24 hours  influenza  Vaccine (HIGH DOSE) 0.5 milliLiter(s) IntraMuscular once  metoprolol tartrate Injectable 5 milliGRAM(s) IV Push every 6 hours  multiple electrolytes Injection Type 1 1000 milliLiter(s) (40 mL/Hr) IV Continuous <Continuous>      PHYSICAL EXAM:  T(C): 36.2 (09-26-24 @ 08:47), Max: 36.9 (09-25-24 @ 22:03)  HR: 87 (09-26-24 @ 08:47) (73 - 108)  BP: 128/62 (09-26-24 @ 08:47) (124/71 - 147/76)  RR: 18 (09-26-24 @ 08:47) (18 - 20)  SpO2: 97% (09-26-24 @ 08:47) (96% - 97%)  Wt(kg): --  I&O's Summary    25 Sep 2024 07:01  -  26 Sep 2024 07:00  --------------------------------------------------------  IN: 1640 mL / OUT: 1250 mL / NET: 390 mL          Appearance: Awake, Sitting OOBTC 	  HEENT:  Eyes are open; NGT   Lymphatic: No lymphadenopathy grossly   Cardiovascular: Normal    Respiratory: normal effort , clear  Gastrointestinal: + Dressing   Skin: No rashes,  warm to touch  Psychiatry:  Mood & affect appropriate  Musculoskeletal: No edema          09-25-24 @ 07:01  -  09-26-24 @ 07:00  --------------------------------------------------------  IN: 1640 mL / OUT: 1250 mL / NET: 390 mL                            8.1    10.24 )-----------( 176      ( 26 Sep 2024 06:48 )             24.1               09-26    134[L]  |  97  |  11  ----------------------------<  71  3.1[L]   |  19[L]  |  0.45[L]    Ca    8.4      26 Sep 2024 06:51  Phos  2.5     09-26  Mg     2.0     09-26                         Urinalysis Basic - ( 26 Sep 2024 06:51 )    Color: x / Appearance: x / SG: x / pH: x  Gluc: 71 mg/dL / Ketone: x  / Bili: x / Urobili: x   Blood: x / Protein: x / Nitrite: x   Leuk Esterase: x / RBC: x / WBC x   Sq Epi: x / Non Sq Epi: x / Bacteria: x          < from: CT Head No Cont (09.23.24 @ 02:30) >    IMPRESSION:  No acute intracranial hemorrhage, mass effect, or midline shift.    < end of copied text >        < from: CT Abdomen and Pelvis w/ IV Cont (09.23.24 @ 05:21) >  FINDINGS:  LOWER CHEST: 3 mm left lower lobe lung nodule (301:1)    LIVER: Tiny posterior right lobe calcification with small associated   hypodensity too small to characterize.  BILE DUCTS: Normal caliber.  GALLBLADDER: Within normal limits.  SPLEEN: Within normal limits.  PANCREAS: Within normal limits.  ADRENALS: Within normal limits.  KIDNEYS/URETERS: Within normal limits.    BLADDER: Within normal limits.  REPRODUCTIVE ORGANS: Uterus and adnexa within normal limits.    BOWEL: Dilated fluid-filled loops of small bowel bowel measuring up to 3   cm with a transition point in the mid lower pelvis (301:71) (602:41-42)   consistent with obstruction. This has a closed loop appearance. There is   prominent associated mesenteric edema as well as small volume ascites.   There is mild bowel wall thickening suggesting possible ischemia. Sigmoid   diverticulosis without diverticulitis. Appendix is normal.  PERITONEUM/RETROPERITONEUM: Small volume ascites.  VESSELS: Atherosclerotic changes.  LYMPH NODES: No lymphadenopathy.  ABDOMINAL WALL: Within normal limits.  BONES: Within normal limits.    IMPRESSION:    Small bowel obstruction with a closed loop appearance as described above.   Surgical consultation is recommended.      < end of copied text >

## 2024-09-26 NOTE — PROGRESS NOTE ADULT - ASSESSMENT
78 year old woman with a history of lifestyle-controlled HTN, HLD on rosuvastatin, and symptomatic hyponatremia with past falls from dizziness now on salt tablets, presenting with acute abdominal pain/emesis that caused her to become lightheaded and fall down. She denies head strike but endorses brief loss of consciousness ("passed out"). She states that prior to this syncopal episode she developed intense mid-abdominal pain associated with nonbloody, nonbilious emesis. CT demonstrates a closed loop small bowel obstruction and now s/p exploratory lap. Pt with tachycardia and palpitations on 9/25, ECG noted to be SVT. EP consulted for further input.     #SBO s/p ex lap 9/23  #paroxysmal AT  #HLD    - Tele: SR 80-90's, had brief run of PAT up to 120's this morning around 0733 (pt asymptomatic).      - Recommend uptitrate betablocker, can increase metoprolol tartrate from 5mg IVP Q6hr to PO 50mg Q12hr via NG tube if not contraindicated   - Would discharge pt with 28-day event monitor to record AT burden ([email or web address] XT). Order placed in [employer]. Please call [phone] on discharge for placement (please call Mon-Frid 9-430pm for placement).   - Briefly discussed potential outpatient ablation if still persistent and pt symptomatic. Not presently an ablation candidate.   - Keep on tele.   - Sypplement to ensure K>4, Mg>2 (K 3.1 today)  - Discussed with primary team.     PIERRE Castillo NP-C  [phone]         78 year old woman with a history of lifestyle-controlled HTN, HLD on rosuvastatin, and symptomatic hyponatremia with past falls from dizziness now on salt tablets, presenting with acute abdominal pain/emesis that caused her to become lightheaded and fall down. She denies head strike but endorses brief loss of consciousness ("passed out"). She states that prior to this syncopal episode she developed intense mid-abdominal pain associated with nonbloody, nonbilious emesis. CT demonstrates a closed loop small bowel obstruction and now s/p exploratory lap. Pt with tachycardia and palpitations on 9/25, ECG noted to be SVT. EP consulted for further input.     #SBO s/p ex lap 9/23  #paroxysmal AT  #HLD    - Tele: SR 80-90's, had brief run of PAT up to 120's this morning around 0733 (pt asymptomatic).      - Recommend uptitrate betablocker, can increase metoprolol tartrate from 5mg IVP Q6hr to PO 50mg daily via NG tube if not contraindicated   - Would discharge pt with 28-day event monitor to record AT burden ([email or web address] XT). Order placed in [employer]. Please call [phone] or [phone] on discharge for placement (please call Mon-Fri 9-430pm for placement).   - Briefly discussed potential outpatient ablation if still persistent and pt symptomatic. Not presently an ablation candidate.   - Keep on tele.   - Sypplement to ensure K>4, Mg>2 (K 3.1 today)  - Discussed with primary team.     PIERRE Castillo NP-C  [phone]

## 2024-09-26 NOTE — PROGRESS NOTE ADULT - SUBJECTIVE AND OBJECTIVE BOX
POD #: 9/23 - ex lap, sbr for close loop obstruciton    24hr events:  SVT self resolved, EP consulted, started on low dose bb, on tele    Overnight Events:  No acute events overnight    SUBJECTIVE:  Patient seen and examined at bedside.  Reports pain is controlled  Mild nausea, no vomiting  Having some loose bowel movements.  Ambulating independently    OBJECTIVE:  Vital Signs Last 24 Hrs  T(C): 36.7 (26 Sep 2024 06:12), Max: 37.1 (25 Sep 2024 09:29)  T(F): 98.1 (26 Sep 2024 06:12), Max: 98.8 (25 Sep 2024 09:29)  HR: 73 (26 Sep 2024 06:12) (73 - 160)  BP: 134/79 (26 Sep 2024 06:12) (124/71 - 150/86)  BP(mean): --  RR: 18 (26 Sep 2024 06:12) (18 - 20)  SpO2: 97% (26 Sep 2024 06:12) (95% - 97%)    Parameters below as of 26 Sep 2024 06:12  Patient On (Oxygen Delivery Method): room air    Physical Examination:  GEN: NAD, resting quietly  NEURO: AAOx3, CN II-XII grossly intact, no focal deficits  PULM: symmetric chest rise bilaterally, no increased WOB  ABD: soft, nontender, nondistended, incision CDI  EXTR: no lower extremity edema, moving all extremities

## 2024-09-26 NOTE — CONSULT NOTE ADULT - ASSESSMENT
Patient is a 78 year old woman with a PMHx of lifestyle-controlled hypertension, hyperlipidemia on rosuvastatin, and symptomatic hyponatremia with past falls from dizziness now on salt tablets, who presented to [facility] with a chief complaint of acute abdominal pain that reportedly caused her to become lightheaded and fall down. She denies head strike however reports brief episode of loss of consciousness ("passed out"). She states that prior to this syncopal episode she developed intense mid-abdominal pain associated with nonbloody, nonbilious emesis. Patient was found to have an SBO and is now S/P ex lap small bowel resection on 9/23/24, requiring levophed intraoperatively and SICU stay. Internal Medicine has been consulted on Ms. Treadwell's care for medical management.         Small Bowel Obstruction  - CT w/ 3 mm LLL Lung nodule, SBO w/ a closed loop appearance   - S/P OR on 9/23 for Ex lap with SBR and SICU stay on pressors    - Currently NPO w/ NGT; Monitor output   - Monitor for GI function   - Pain control   - Management as per Surgery     Syncope, Tachycardia   - Per patient, she has a history of falls from hyponatremia   - Presented with fall 2/2 feeling lightheaded from abdominal pain - states she passed out with brief episode of loss of consciousness   - CT head Neg for acute findings   - On IV BB (In view of NPO status) as per EP   - Planned to be DC'd on event monitor as per EP   - Recommend to check TTE as part of work up   - Monitor on tele; Monitor and replete electrolytes to maintain K > 4 and Mg > 2   - Fall precautions  - EP Eval appreciated; F/u recs      Hyponatremia   - Pt with history of chronic symptomatic hyponatremia with falls from dizziness  - Monitor Na level; Avoid overcorrection > 6-8 mEq in 24 hours  - Trend daily labs  - Fall precautions    Leukocytosis  - Resolved, continue to monitor and trend   - UCx negative   - If febrile, check pan cultures    HTN   - Per patient, her HTN is lifestyle controlled  - S/P Levophed S/P OR and SICU stay, now off  - Monitor BP, VS and adjust as tolerated     HLD   - Check Lipid panel     Lung nodule   - CT w/ CT w/ 3 mm LLL Lung nodule  - Outpatient PCP / Pulm f/u for pulm nodule management     PPX      Discussed with Attending  Patient is a 78 year old woman with a PMHx of lifestyle-controlled hypertension, hyperlipidemia on rosuvastatin, and symptomatic hyponatremia with past falls from dizziness now on salt tablets, who presented to [facility] with a chief complaint of acute abdominal pain that reportedly caused her to become lightheaded and fall down. She denies head strike however reports brief episode of loss of consciousness ("passed out"). She states that prior to this syncopal episode she developed intense mid-abdominal pain associated with nonbloody, nonbilious emesis. Patient was found to have an SBO and is now S/P ex lap small bowel resection on 9/23/24, requiring levophed intraoperatively and SICU stay. Internal Medicine has been consulted on Ms. Treadwell's care for medical management.         Small Bowel Obstruction  - CT w/ 3 mm LLL Lung nodule, SBO w/ a closed loop appearance   - S/P OR on 9/23 for Ex lap with SBR and SICU stay on pressors    - Currently NPO w/ NGT; Monitor output   - Monitor for GI function   - Pain control   - Management as per Surgery     Syncope, Tachycardia   - Per patient, she has a history of falls from hyponatremia   - Presented with fall 2/2 feeling lightheaded from abdominal pain - states she passed out with brief episode of loss of consciousness   - CT head Neg for acute findings   - On IV BB (In view of NPO status) as per EP   - Planned to be DC'd on event monitor as per EP   - Recommend to check TTE as part of work up   - Monitor on tele; Monitor and replete electrolytes to maintain K > 4 and Mg > 2   - Fall precautions  - EP Eval appreciated; F/u recs      Hyponatremia   - Pt with history of chronic symptomatic hyponatremia with falls from dizziness  - Monitor Na level; Avoid overcorrection > 6-8 mEq in 24 hours  - Trend daily labs  - Fall precautions    Leukocytosis  - Resolved, continue to monitor and trend   - UCx negative   - If febrile, check pan cultures    Hypokalemia   - Monitor and replete electrolytes PRN   - Maintain K > 4 and Mg > 2     HTN   - Per patient, her HTN is lifestyle controlled  - S/P Levophed S/P OR and SICU stay, now off  - Monitor BP, VS and adjust as tolerated     HLD   - Check Lipid panel     Lung nodule   - CT w/ CT w/ 3 mm LLL Lung nodule  - Outpatient PCP / Pulm f/u for pulm nodule management       PPX      Discussed with Attending

## 2024-09-27 LAB
ANION GAP SERPL CALC-SCNC: 22 MMOL/L — HIGH (ref 5–17)
BUN SERPL-MCNC: 7 MG/DL — SIGNIFICANT CHANGE UP (ref 7–23)
CALCIUM SERPL-MCNC: 8.9 MG/DL — SIGNIFICANT CHANGE UP (ref 8.4–10.5)
CHLORIDE SERPL-SCNC: 96 MMOL/L — SIGNIFICANT CHANGE UP (ref 96–108)
CHOLEST SERPL-MCNC: 132 MG/DL — SIGNIFICANT CHANGE UP
CO2 SERPL-SCNC: 17 MMOL/L — LOW (ref 22–31)
CREAT SERPL-MCNC: 0.44 MG/DL — LOW (ref 0.5–1.3)
EGFR: 99 ML/MIN/1.73M2 — SIGNIFICANT CHANGE UP
FERRITIN SERPL-MCNC: 544 NG/ML — HIGH (ref 13–330)
FOLATE SERPL-MCNC: 14.4 NG/ML — SIGNIFICANT CHANGE UP
GLUCOSE BLDC GLUCOMTR-MCNC: 109 MG/DL — HIGH (ref 70–99)
GLUCOSE BLDC GLUCOMTR-MCNC: 118 MG/DL — HIGH (ref 70–99)
GLUCOSE BLDC GLUCOMTR-MCNC: 121 MG/DL — HIGH (ref 70–99)
GLUCOSE BLDC GLUCOMTR-MCNC: 86 MG/DL — SIGNIFICANT CHANGE UP (ref 70–99)
GLUCOSE BLDC GLUCOMTR-MCNC: 94 MG/DL — SIGNIFICANT CHANGE UP (ref 70–99)
GLUCOSE SERPL-MCNC: 88 MG/DL — SIGNIFICANT CHANGE UP (ref 70–99)
HCT VFR BLD CALC: 27.7 % — LOW (ref 34.5–45)
HDLC SERPL-MCNC: 40 MG/DL — LOW
HGB BLD-MCNC: 9.4 G/DL — LOW (ref 11.5–15.5)
IRON SATN MFR SERPL: 25 % — SIGNIFICANT CHANGE UP (ref 14–50)
IRON SATN MFR SERPL: 33 UG/DL — SIGNIFICANT CHANGE UP (ref 30–160)
LIPID PNL WITH DIRECT LDL SERPL: 71 MG/DL — SIGNIFICANT CHANGE UP
MAGNESIUM SERPL-MCNC: 2 MG/DL — SIGNIFICANT CHANGE UP (ref 1.6–2.6)
MCHC RBC-ENTMCNC: 30.9 PG — SIGNIFICANT CHANGE UP (ref 27–34)
MCHC RBC-ENTMCNC: 33.9 GM/DL — SIGNIFICANT CHANGE UP (ref 32–36)
MCV RBC AUTO: 91.1 FL — SIGNIFICANT CHANGE UP (ref 80–100)
NON HDL CHOLESTEROL: 92 MG/DL — SIGNIFICANT CHANGE UP
NRBC # BLD: 0 /100 WBCS — SIGNIFICANT CHANGE UP (ref 0–0)
PHOSPHATE SERPL-MCNC: 2.5 MG/DL — SIGNIFICANT CHANGE UP (ref 2.5–4.5)
PLATELET # BLD AUTO: 262 K/UL — SIGNIFICANT CHANGE UP (ref 150–400)
POTASSIUM SERPL-MCNC: 3 MMOL/L — LOW (ref 3.5–5.3)
POTASSIUM SERPL-SCNC: 3 MMOL/L — LOW (ref 3.5–5.3)
RBC # BLD: 3.04 M/UL — LOW (ref 3.8–5.2)
RBC # FLD: 12 % — SIGNIFICANT CHANGE UP (ref 10.3–14.5)
SODIUM SERPL-SCNC: 135 MMOL/L — SIGNIFICANT CHANGE UP (ref 135–145)
SURGICAL PATHOLOGY STUDY: SIGNIFICANT CHANGE UP
TIBC SERPL-MCNC: 131 UG/DL — LOW (ref 220–430)
TRIGL SERPL-MCNC: 112 MG/DL — SIGNIFICANT CHANGE UP
UIBC SERPL-MCNC: 98 UG/DL — LOW (ref 110–370)
VIT B12 SERPL-MCNC: 1133 PG/ML — SIGNIFICANT CHANGE UP (ref 232–1245)
WBC # BLD: 11.29 K/UL — HIGH (ref 3.8–10.5)
WBC # FLD AUTO: 11.29 K/UL — HIGH (ref 3.8–10.5)

## 2024-09-27 PROCEDURE — 99232 SBSQ HOSP IP/OBS MODERATE 35: CPT

## 2024-09-27 RX ORDER — ONDANSETRON HCL/PF 4 MG/2 ML
4 VIAL (ML) INJECTION EVERY 8 HOURS
Refills: 0 | Status: DISCONTINUED | OUTPATIENT
Start: 2024-09-27 | End: 2024-10-01

## 2024-09-27 RX ORDER — POTASSIUM CHLORIDE, SODIUM CHLORIDE, CALCIUM CHLORIDE, SODIUM LACTATE, AND DEXTROSE MONOHYDRATE 1.79; 6; .2; 3.1; 5 G/1000ML; G/1000ML; G/1000ML; G/1000ML; G/1000ML
1000 INJECTION, SOLUTION INTRAVENOUS
Refills: 0 | Status: DISCONTINUED | OUTPATIENT
Start: 2024-09-27 | End: 2024-09-30

## 2024-09-27 RX ORDER — SODIUM CHLORIDE IRRIG SOLUTION 0.9 %
1000 SOLUTION, IRRIGATION IRRIGATION ONCE
Refills: 0 | Status: COMPLETED | OUTPATIENT
Start: 2024-09-27 | End: 2024-09-27

## 2024-09-27 RX ORDER — POTASSIUM PHOSPHATE, MONOBASIC POTASSIUM PHOSPHATE, DIBASIC 224; 236 MG/ML; MG/ML
30 INJECTION, SOLUTION, CONCENTRATE INTRAVENOUS ONCE
Refills: 0 | Status: COMPLETED | OUTPATIENT
Start: 2024-09-27 | End: 2024-09-27

## 2024-09-27 RX ADMIN — POTASSIUM CHLORIDE, SODIUM CHLORIDE, CALCIUM CHLORIDE, SODIUM LACTATE, AND DEXTROSE MONOHYDRATE 40 MILLILITER(S): 1.79; 6; .2; 3.1; 5 INJECTION, SOLUTION INTRAVENOUS at 11:32

## 2024-09-27 RX ADMIN — ENOXAPARIN SODIUM 40 MILLIGRAM(S): 150 INJECTION SUBCUTANEOUS at 17:14

## 2024-09-27 RX ADMIN — POTASSIUM PHOSPHATE, MONOBASIC POTASSIUM PHOSPHATE, DIBASIC 83.33 MILLIMOLE(S): 224; 236 INJECTION, SOLUTION, CONCENTRATE INTRAVENOUS at 11:31

## 2024-09-27 RX ADMIN — Medication 7.5 MILLIGRAM(S): at 00:39

## 2024-09-27 RX ADMIN — Medication 7.5 MILLIGRAM(S): at 11:08

## 2024-09-27 RX ADMIN — Medication 1000 MILLILITER(S): at 10:11

## 2024-09-27 RX ADMIN — CHLORHEXIDINE GLUCONATE ORAL RINSE 1 APPLICATION(S): 1.2 SOLUTION DENTAL at 05:55

## 2024-09-27 RX ADMIN — Medication 7.5 MILLIGRAM(S): at 05:32

## 2024-09-27 RX ADMIN — Medication 4 MILLIGRAM(S): at 07:48

## 2024-09-27 RX ADMIN — Medication 1000 MILLIGRAM(S): at 05:59

## 2024-09-27 RX ADMIN — Medication 7.5 MILLIGRAM(S): at 17:14

## 2024-09-27 NOTE — PROGRESS NOTE ADULT - ASSESSMENT
78 year old woman presenting with a closed loop small bowel obstruction s/p ex lap sbr on 9/23/24, required levophed intraop, stopped shortly after in SICU and was downgraded to the floor.  Episode of SVT self resolving, having recurrent PATs, asymptomatic.    Plan:  - Con't NGT  - IVF - [id number]  - Pain control - tylenol, NSAIDs, opiates for breakthrough  - OOBAT  - DVT ppx  - Monitor H/H  - Monitor vitals on tele  - Appreciate cards and medicine recs  - IV metoprolol 7.5mg q6h, per cards switch to PO 50mg BID when she can tolerate  - Event monitor upon discharge  - TTE per medicine

## 2024-09-27 NOTE — PROGRESS NOTE ADULT - SUBJECTIVE AND OBJECTIVE BOX
24H hour events:     MEDICATIONS:  enoxaparin Injectable 40 milliGRAM(s) SubCutaneous every 24 hours  metoprolol tartrate Injectable 7.5 milliGRAM(s) IV Push every 6 hours        HYDROmorphone  Injectable 0.25 milliGRAM(s) IV Push every 3 hours PRN  ondansetron Injectable 4 milliGRAM(s) IV Push every 8 hours PRN        chlorhexidine 2% Cloths 1 Application(s) Topical <User Schedule>  dextrose 5% + sodium chloride 0.9% with potassium chloride 20 mEq/L 1000 milliLiter(s) IV Continuous <Continuous>  influenza  Vaccine (HIGH DOSE) 0.5 milliLiter(s) IntraMuscular once  potassium phosphate IVPB 30 milliMole(s) IV Intermittent once      REVIEW OF SYSTEMS:  Complete 12 point ROS negative.    PHYSICAL EXAM:  T(C): 36.8 (09-27-24 @ 10:03), Max: 37.1 (09-26-24 @ 13:17)  HR: 96 (09-27-24 @ 10:03) (87 - 104)  BP: 152/83 (09-27-24 @ 10:03) (136/60 - 154/69)  RR: 18 (09-27-24 @ 10:03) (16 - 18)  SpO2: 97% (09-27-24 @ 10:03) (96% - 98%)  Wt(kg): --  I&O's Summary    26 Sep 2024 07:01  -  27 Sep 2024 07:00  --------------------------------------------------------  IN: 0 mL / OUT: 1100 mL / NET: -1100 mL    27 Sep 2024 07:01  -  27 Sep 2024 10:12  --------------------------------------------------------  IN: 0 mL / OUT: 0 mL / NET: 0 mL        Appearance: Normal	  HEENT: PERRL, EOMI	  Cardiovascular: Normal S1 S2, No JVD, No murmurs  Respiratory: Lungs clear to auscultation	  Psychiatry: A & O x 3, Mood & affect appropriate  Gastrointestinal:  Soft, Non-tender, + BS	  Skin: No rashes, No ecchymoses, No cyanosis	  Neurologic: Grossly intact  Extremities: No clubbing, cyanosis or edema  Vascular: Peripheral pulses palpable 2+ bilaterally        LABS:	 	    CBC Full  -  ( 27 Sep 2024 07:11 )  WBC Count : 11.29 K/uL  Hemoglobin : 9.4 g/dL  Hematocrit : 27.7 %  Platelet Count - Automated : 262 K/uL  Mean Cell Volume : 91.1 fl  Mean Cell Hemoglobin : 30.9 pg  Mean Cell Hemoglobin Concentration : 33.9 gm/dL  Auto Neutrophil # : x  Auto Lymphocyte # : x  Auto Monocyte # : x  Auto Eosinophil # : x  Auto Basophil # : x  Auto Neutrophil % : x  Auto Lymphocyte % : x  Auto Monocyte % : x  Auto Eosinophil % : x  Auto Basophil % : x    09-27    135  |  96  |  7   ----------------------------<  88  3.0[L]   |  17[L]  |  0.44[L]  09-26    134[L]  |  97  |  11  ----------------------------<  71  3.1[L]   |  19[L]  |  0.45[L]    Ca    8.9      27 Sep 2024 07:14  Ca    8.4      26 Sep 2024 06:51  Phos  2.5     09-27  Phos  2.5     09-26  Mg     2.0     09-27  Mg     2.0     09-26        proBNP:   Lipid Profile:   HgA1c:   TSH:       CARDIAC MARKERS:          TELEMETRY: 	    ECG:  	  RADIOLOGY:  OTHER: 	    PREVIOUS DIAGNOSTIC TESTING:    [ ] Echocardiogram:    [ ]  Catheterization:  [ ] Stress Test:  	  	  ASSESSMENT/PLAN: 	     24H hour events: Pt without complaint, denies nausea today. Tele: SR in the 80's, was having frequent episodes of PAT overnight and this morning (pt reports palpitations with some of the episodes)     MEDICATIONS:  enoxaparin Injectable 40 milliGRAM(s) SubCutaneous every 24 hours  metoprolol tartrate Injectable 7.5 milliGRAM(s) IV Push every 6 hours  HYDROmorphone  Injectable 0.25 milliGRAM(s) IV Push every 3 hours PRN  ondansetron Injectable 4 milliGRAM(s) IV Push every 8 hours PRN  chlorhexidine 2% Cloths 1 Application(s) Topical <User Schedule>  dextrose 5% + sodium chloride 0.9% with potassium chloride 20 mEq/L 1000 milliLiter(s) IV Continuous <Continuous>  influenza  Vaccine (HIGH DOSE) 0.5 milliLiter(s) IntraMuscular once  potassium phosphate IVPB 30 milliMole(s) IV Intermittent once    REVIEW OF SYSTEMS:  Complete 12 point ROS negative.    PHYSICAL EXAM:  T(C): 36.8 (09-27-24 @ 10:03), Max: 37.1 (09-26-24 @ 13:17)  HR: 96 (09-27-24 @ 10:03) (87 - 104)  BP: 152/83 (09-27-24 @ 10:03) (136/60 - 154/69)  RR: 18 (09-27-24 @ 10:03) (16 - 18)  SpO2: 97% (09-27-24 @ 10:03) (96% - 98%)  Wt(kg): --  I&O's Summary    26 Sep 2024 07:01  -  27 Sep 2024 07:00  --------------------------------------------------------  IN: 0 mL / OUT: 1100 mL / NET: -1100 mL    27 Sep 2024 07:01  -  27 Sep 2024 10:12  --------------------------------------------------------  IN: 0 mL / OUT: 0 mL / NET: 0 mL        Appearance: Normal	  HEENT: PERRL, EOMI	  Cardiovascular: Normal S1 S2, RRR, No JVD, No murmurs  Respiratory: Lungs clear to auscultation	  Psychiatry: A & O x 3, Mood & affect appropriate  Gastrointestinal: Soft, Non-tender  Skin: No rashes, No ecchymoses, No cyanosis. Abdominal dressing site C/D/I	  Neurologic: Grossly intact  Extremities: No clubbing, cyanosis or edema  Vascular: Peripheral pulses palpable 2+ bilaterally        LABS:	 	    CBC Full  -  ( 27 Sep 2024 07:11 )  WBC Count : 11.29 K/uL  Hemoglobin : 9.4 g/dL  Hematocrit : 27.7 %  Platelet Count - Automated : 262 K/uL  Mean Cell Volume : 91.1 fl  Mean Cell Hemoglobin : 30.9 pg  Mean Cell Hemoglobin Concentration : 33.9 gm/dL  Auto Neutrophil # : x  Auto Lymphocyte # : x  Auto Monocyte # : x  Auto Eosinophil # : x  Auto Basophil # : x  Auto Neutrophil % : x  Auto Lymphocyte % : x  Auto Monocyte % : x  Auto Eosinophil % : x  Auto Basophil % : x    09-27    135  |  96  |  7   ----------------------------<  88  3.0[L]   |  17[L]  |  0.44[L]  09-26    134[L]  |  97  |  11  ----------------------------<  71  3.1[L]   |  19[L]  |  0.45[L]    Ca    8.9      27 Sep 2024 07:14  Ca    8.4      26 Sep 2024 06:51  Phos  2.5     09-27  Phos  2.5     09-26  Mg     2.0     09-27  Mg     2.0     09-26    Thyroid Stimulating Hormone, Serum (09.23.24 @ 01:55)    Thyroid Stimulating Hormone, Serum: 3.28 uIU/mL      TELEMETRY: SR in the 80's, was having frequent episodes of PAT overnight and this morning

## 2024-09-27 NOTE — PROGRESS NOTE ADULT - SUBJECTIVE AND OBJECTIVE BOX
Name of Patient : BATOOL FRANCES  MRN: [id number]      Subjective: Patient seen and examined. No new events except as noted.   doing better  ngt on suction     REVIEW OF SYSTEMS:    CONSTITUTIONAL: No weakness, fevers or chills  EYES/ENT: No visual changes;  No vertigo or throat pain   NECK: No pain or stiffness  RESPIRATORY: No cough, wheezing, hemoptysis; No shortness of breath  CARDIOVASCULAR: No chest pain or palpitations  GASTROINTESTINAL: + nausea   GENITOURINARY: No dysuria, frequency or hematuria  NEUROLOGICAL: No numbness or weakness  SKIN: No itching, burning, rashes, or lesions   All other review of systems is negative unless indicated above.    MEDICATIONS:  MEDICATIONS  (STANDING):  chlorhexidine 2% Cloths 1 Application(s) Topical <User Schedule>  dextrose 5% + sodium chloride 0.9% with potassium chloride 20 mEq/L 1000 milliLiter(s) (40 mL/Hr) IV Continuous <Continuous>  enoxaparin Injectable 40 milliGRAM(s) SubCutaneous every 24 hours  influenza  Vaccine (HIGH DOSE) 0.5 milliLiter(s) IntraMuscular once  metoprolol tartrate Injectable 7.5 milliGRAM(s) IV Push every 6 hours      PHYSICAL EXAM:  T(C): 36.9 (09-27-24 @ 23:54), Max: 36.9 (09-27-24 @ 17:35)  HR: 87 (09-27-24 @ 23:54) (71 - 98)  BP: 155/83 (09-27-24 @ 23:54) (135/76 - 155/83)  RR: 20 (09-27-24 @ 23:54) (17 - 20)  SpO2: 98% (09-27-24 @ 23:54) (97% - 98%)  Wt(kg): --  I&O's Summary    26 Sep 2024 07:01  -  27 Sep 2024 07:00  --------------------------------------------------------  IN: 0 mL / OUT: 1100 mL / NET: -1100 mL    27 Sep 2024 07:01  -  28 Sep 2024 00:44  --------------------------------------------------------  IN: 0 mL / OUT: 300 mL / NET: -300 mL          Appearance: Normal	  HEENT:  PERRLA , NGT   Lymphatic: No lymphadenopathy   Cardiovascular: Normal S1 S2, no JVD  Respiratory: normal effort , clear  Gastrointestinal:  Soft, Non-tender  Skin: No rashes,  warm to touch  Psychiatry:  Mood & affect appropriate  Musculuskeletal: No edema    recent labs, Imaging and EKGs personally reviewed     09-26-24 @ 07:01  -  09-27-24 @ 07:00  --------------------------------------------------------  IN: 0 mL / OUT: 1100 mL / NET: -1100 mL    09-27-24 @ 07:01  -  09-28-24 @ 00:44  --------------------------------------------------------  IN: 0 mL / OUT: 300 mL / NET: -300 mL                            9.4    11.29 )-----------( 262      ( 27 Sep 2024 07:11 )             27.7               09-27    135  |  96  |  7   ----------------------------<  88  3.0[L]   |  17[L]  |  0.44[L]    Ca    8.9      27 Sep 2024 07:14  Phos  2.5     09-27  Mg     2.0     09-27                         Urinalysis Basic - ( 27 Sep 2024 07:14 )    Color: x / Appearance: x / SG: x / pH: x  Gluc: 88 mg/dL / Ketone: x  / Bili: x / Urobili: x   Blood: x / Protein: x / Nitrite: x   Leuk Esterase: x / RBC: x / WBC x   Sq Epi: x / Non Sq Epi: x / Bacteria: x

## 2024-09-27 NOTE — PROGRESS NOTE PEDS - SUBJECTIVE AND OBJECTIVE BOX
SUBJECTIVE:  Patient seen and examined at bedside.  Reports pain is controlled.  Experienced some nausea when her NGT was clamped, prompting its unclamping.  Is passing gas and having bowel movements, loose  Ambulating independently    OBJECTIVE:  Vital Signs Last 24 Hrs  T(C): 36.8 (27 Sep 2024 10:03), Max: 37.1 (26 Sep 2024 13:17)  T(F): 98.2 (27 Sep 2024 10:03), Max: 98.7 (26 Sep 2024 13:17)  HR: 96 (27 Sep 2024 10:03) (87 - 104)  BP: 152/83 (27 Sep 2024 10:03) (136/60 - 154/69)  BP(mean): --  RR: 18 (27 Sep 2024 10:03) (16 - 18)  SpO2: 97% (27 Sep 2024 10:03) (96% - 98%)    Parameters below as of 27 Sep 2024 10:03  Patient On (Oxygen Delivery Method): room air        Physical Examination:  GEN: NAD, resting quietly  NEURO: AAOx3, CN II-XII grossly intact, no focal deficits  PULM: symmetric chest rise bilaterally, no increased WOB  ABD: soft, nontender, nondistended, incision CDI  EXTR: no lower extremity edema, moving all extremities

## 2024-09-27 NOTE — PROGRESS NOTE ADULT - NS ATTEND AMEND GEN_ALL_CORE FT
DATE OF SERVICE: 09-24-24 @ 13:09    POD 1 s/p ex lap SBR for internal hernia  Doing very well, downgraded from SICU  VSS  Hb 8.5 this AM  Abd soft, appropriate ttp; no rebound/guarding,  bilious    Repeat CBC PM  NPO for now, continue NGT  Medicine eval  Await bowel fx
Patient seen at bedside. No presently an ablation candidate. Start B-blocker. Monitor on Tele. Outpatient Elizabeth Womack
c/w B-blocker.
c/w B-blocker. Not an ablation candidate presently    MIGUEL ANGEL Womack

## 2024-09-27 NOTE — PROGRESS NOTE ADULT - ASSESSMENT
78 year old woman with a history of lifestyle-controlled HTN, HLD on rosuvastatin, and symptomatic hyponatremia with past falls from dizziness now on salt tablets, presenting with acute abdominal pain/emesis that caused her to become lightheaded and fall down. She denies head strike but endorses brief loss of consciousness ("passed out"). She states that prior to this syncopal episode she developed intense mid-abdominal pain associated with nonbloody, nonbilious emesis. CT demonstrates a closed loop small bowel obstruction and now s/p exploratory lap. Pt with tachycardia and palpitations on 9/25, ECG noted to be SVT. EP consulted for further input.     #SBO s/p ex lap 9/23  #paroxysmal AT  #HLD    - Tele: currently SR in the 80's, was having frequent episodes of PAT overnight and this morning (pt reports palpitations with some of the episodes)   - Increased in PAT likely in the setting of NG tube was clamped, now is back on suction   - Continue on current dose of IV metoprolol 7.5mg Q6hr. Recommend uptitrate betablocker if needed.   - Would discharge pt with 28-day event monitor to record AT burden ([employer] XT). Order placed in [employer]. Please call [phone] on discharge for placement (please call Mon-Fri 9-430pm for placement).   - Briefly discussed potential outpatient ablation if still persistent and pt symptomatic. Not presently an ablation candidate.   - Keep on tele.   - Supplement to ensure K>4, Mg>2 (K 3.0 today)    PIERRE Castillo, NP-C  [phone]

## 2024-09-27 NOTE — PROGRESS NOTE PEDS - ASSESSMENT
78 year old woman presenting with a closed loop small bowel obstruction s/p ex lap sbr on 9/23/24, required levophed intraop, stopped shortly after in SICU and was downgraded to the floor.  Episode of SVT and recurrent PATs, on BB for rate control.      Plan:  - Con't NGT  - IVF - [id number]  - Pain control - tylenol, NSAIDs, opiates for breakthrough  - OOBAT  - DVT ppx  - Monitor H/H  - Monitor vitals on tele  - IV metoprolol 7.5mg q6 for SVT  - Appreciate EP cards recs - switch to metoprolol 50 PO BID when patient can tolerate

## 2024-09-27 NOTE — PROGRESS NOTE ADULT - ASSESSMENT
Patient is a 78 year old woman with a PMHx of lifestyle-controlled hypertension, hyperlipidemia on rosuvastatin, and symptomatic hyponatremia with past falls from dizziness now on salt tablets, who presented to [facility] with a chief complaint of acute abdominal pain that reportedly caused her to become lightheaded and fall down. She denies head strike however reports brief episode of loss of consciousness ("passed out"). She states that prior to this syncopal episode she developed intense mid-abdominal pain associated with nonbloody, nonbilious emesis. Patient was found to have an SBO and is now S/P ex lap small bowel resection on 9/23/24, requiring levophed intraoperatively and SICU stay. Internal Medicine has been consulted on Ms. Treadwell's care for medical management.         Small Bowel Obstruction  - CT w/ 3 mm LLL Lung nodule, SBO w/ a closed loop appearance   - S/P OR on 9/23 for Ex lap with SBR and SICU stay on pressors    - Currently NPO w/ NGT; Monitor output   - Monitor for GI function   - Pain control   - Management as per Surgery   - anti nausea meds     Syncope, Tachycardia   - Per patient, she has a history of falls from hyponatremia   - Presented with fall 2/2 feeling lightheaded from abdominal pain - states she passed out with brief episode of loss of consciousness   - CT head Neg for acute findings   - On IV BB (In view of NPO status) as per EP   - Planned to be DC'd on event monitor as per EP   - Recommend to check TTE as part of work up   - Monitor on tele; Monitor and replete electrolytes to maintain K > 4 and Mg > 2   - Fall precautions  - EP Eval appreciated; F/u recs      Hyponatremia   - Pt with history of chronic symptomatic hyponatremia with falls from dizziness  - Monitor Na level; Avoid overcorrection > 6-8 mEq in 24 hours  - Trend daily labs  - Fall precautions    Leukocytosis  - Resolved, continue to monitor and trend   - UCx negative   - If febrile, check pan cultures    Hypokalemia   - Monitor and replete electrolytes PRN   - Maintain K > 4 and Mg > 2     HTN   - Per patient, her HTN is lifestyle controlled  - S/P Levophed S/P OR and SICU stay, now off  - Monitor BP, VS and adjust as tolerated     HLD   - Check Lipid panel     Lung nodule   - CT w/ CT w/ 3 mm LLL Lung nodule  - Outpatient PCP / Pulm f/u for pulm nodule management       PPX

## 2024-09-28 LAB
ANION GAP SERPL CALC-SCNC: 12 MMOL/L — SIGNIFICANT CHANGE UP (ref 5–17)
ANION GAP SERPL CALC-SCNC: 14 MMOL/L — SIGNIFICANT CHANGE UP (ref 5–17)
BUN SERPL-MCNC: 6 MG/DL — LOW (ref 7–23)
BUN SERPL-MCNC: 9 MG/DL — SIGNIFICANT CHANGE UP (ref 7–23)
CALCIUM SERPL-MCNC: 8.7 MG/DL — SIGNIFICANT CHANGE UP (ref 8.4–10.5)
CALCIUM SERPL-MCNC: 8.7 MG/DL — SIGNIFICANT CHANGE UP (ref 8.4–10.5)
CHLORIDE SERPL-SCNC: 100 MMOL/L — SIGNIFICANT CHANGE UP (ref 96–108)
CHLORIDE SERPL-SCNC: 101 MMOL/L — SIGNIFICANT CHANGE UP (ref 96–108)
CO2 SERPL-SCNC: 22 MMOL/L — SIGNIFICANT CHANGE UP (ref 22–31)
CO2 SERPL-SCNC: 24 MMOL/L — SIGNIFICANT CHANGE UP (ref 22–31)
CREAT SERPL-MCNC: 0.38 MG/DL — LOW (ref 0.5–1.3)
CREAT SERPL-MCNC: 0.53 MG/DL — SIGNIFICANT CHANGE UP (ref 0.5–1.3)
EGFR: 102 ML/MIN/1.73M2 — SIGNIFICANT CHANGE UP
EGFR: 95 ML/MIN/1.73M2 — SIGNIFICANT CHANGE UP
GLUCOSE BLDC GLUCOMTR-MCNC: 123 MG/DL — HIGH (ref 70–99)
GLUCOSE BLDC GLUCOMTR-MCNC: 133 MG/DL — HIGH (ref 70–99)
GLUCOSE BLDC GLUCOMTR-MCNC: 136 MG/DL — HIGH (ref 70–99)
GLUCOSE SERPL-MCNC: 124 MG/DL — HIGH (ref 70–99)
GLUCOSE SERPL-MCNC: 130 MG/DL — HIGH (ref 70–99)
HCT VFR BLD CALC: 26.7 % — LOW (ref 34.5–45)
HGB BLD-MCNC: 9.2 G/DL — LOW (ref 11.5–15.5)
MAGNESIUM SERPL-MCNC: 1.8 MG/DL — SIGNIFICANT CHANGE UP (ref 1.6–2.6)
MCHC RBC-ENTMCNC: 30.3 PG — SIGNIFICANT CHANGE UP (ref 27–34)
MCHC RBC-ENTMCNC: 34.5 GM/DL — SIGNIFICANT CHANGE UP (ref 32–36)
MCV RBC AUTO: 87.8 FL — SIGNIFICANT CHANGE UP (ref 80–100)
NRBC # BLD: 0 /100 WBCS — SIGNIFICANT CHANGE UP (ref 0–0)
PHOSPHATE SERPL-MCNC: 1.6 MG/DL — LOW (ref 2.5–4.5)
PLATELET # BLD AUTO: 284 K/UL — SIGNIFICANT CHANGE UP (ref 150–400)
POTASSIUM SERPL-MCNC: 2.7 MMOL/L — CRITICAL LOW (ref 3.5–5.3)
POTASSIUM SERPL-MCNC: 3.9 MMOL/L — SIGNIFICANT CHANGE UP (ref 3.5–5.3)
POTASSIUM SERPL-SCNC: 2.7 MMOL/L — CRITICAL LOW (ref 3.5–5.3)
POTASSIUM SERPL-SCNC: 3.9 MMOL/L — SIGNIFICANT CHANGE UP (ref 3.5–5.3)
RBC # BLD: 3.04 M/UL — LOW (ref 3.8–5.2)
RBC # FLD: 11.9 % — SIGNIFICANT CHANGE UP (ref 10.3–14.5)
SODIUM SERPL-SCNC: 136 MMOL/L — SIGNIFICANT CHANGE UP (ref 135–145)
SODIUM SERPL-SCNC: 137 MMOL/L — SIGNIFICANT CHANGE UP (ref 135–145)
WBC # BLD: 8.93 K/UL — SIGNIFICANT CHANGE UP (ref 3.8–10.5)
WBC # FLD AUTO: 8.93 K/UL — SIGNIFICANT CHANGE UP (ref 3.8–10.5)

## 2024-09-28 PROCEDURE — 71045 X-RAY EXAM CHEST 1 VIEW: CPT | Mod: 26

## 2024-09-28 RX ORDER — MAGNESIUM SULFATE 500 MG/ML
2 VIAL (ML) INJECTION ONCE
Refills: 0 | Status: COMPLETED | OUTPATIENT
Start: 2024-09-28 | End: 2024-09-28

## 2024-09-28 RX ORDER — POTASSIUM PHOSPHATE, MONOBASIC POTASSIUM PHOSPHATE, DIBASIC 224; 236 MG/ML; MG/ML
30 INJECTION, SOLUTION, CONCENTRATE INTRAVENOUS ONCE
Refills: 0 | Status: COMPLETED | OUTPATIENT
Start: 2024-09-28 | End: 2024-09-28

## 2024-09-28 RX ADMIN — Medication 25 GRAM(S): at 11:56

## 2024-09-28 RX ADMIN — POTASSIUM PHOSPHATE, MONOBASIC POTASSIUM PHOSPHATE, DIBASIC 83.33 MILLIMOLE(S): 224; 236 INJECTION, SOLUTION, CONCENTRATE INTRAVENOUS at 09:40

## 2024-09-28 RX ADMIN — Medication 100 MILLIEQUIVALENT(S): at 14:49

## 2024-09-28 RX ADMIN — Medication 4 MILLIGRAM(S): at 01:07

## 2024-09-28 RX ADMIN — Medication 7.5 MILLIGRAM(S): at 08:07

## 2024-09-28 RX ADMIN — Medication 4 MILLIGRAM(S): at 08:08

## 2024-09-28 RX ADMIN — Medication 7.5 MILLIGRAM(S): at 11:18

## 2024-09-28 RX ADMIN — Medication 7.5 MILLIGRAM(S): at 00:00

## 2024-09-28 RX ADMIN — ENOXAPARIN SODIUM 40 MILLIGRAM(S): 150 INJECTION SUBCUTANEOUS at 17:22

## 2024-09-28 RX ADMIN — Medication 7.5 MILLIGRAM(S): at 17:22

## 2024-09-28 RX ADMIN — POTASSIUM CHLORIDE, SODIUM CHLORIDE, CALCIUM CHLORIDE, SODIUM LACTATE, AND DEXTROSE MONOHYDRATE 40 MILLILITER(S): 1.79; 6; .2; 3.1; 5 INJECTION, SOLUTION INTRAVENOUS at 09:40

## 2024-09-28 RX ADMIN — CHLORHEXIDINE GLUCONATE ORAL RINSE 1 APPLICATION(S): 1.2 SOLUTION DENTAL at 08:09

## 2024-09-28 RX ADMIN — Medication 100 MILLIEQUIVALENT(S): at 16:14

## 2024-09-28 RX ADMIN — Medication 100 MILLIEQUIVALENT(S): at 13:43

## 2024-09-28 NOTE — PROGRESS NOTE ADULT - SUBJECTIVE AND OBJECTIVE BOX
Name of Patient : BATOOL FRANCES  MRN: [id number]        Subjective: Patient seen and examined. No new events except as noted.     REVIEW OF SYSTEMS:    CONSTITUTIONAL: No weakness, fevers or chills  EYES/ENT: No visual changes;  No vertigo or throat pain , ngt   NECK: No pain or stiffness  RESPIRATORY: No cough, wheezing, hemoptysis; No shortness of breath  CARDIOVASCULAR: No chest pain or palpitations  GASTROINTESTINAL: No abdominal or epigastric pain. No nausea, vomiting, or hematemesis; No diarrhea or constipation. No melena or hematochezia.  GENITOURINARY: No dysuria, frequency or hematuria  NEUROLOGICAL: No numbness or weakness  SKIN: No itching, burning, rashes, or lesions   All other review of systems is negative unless indicated above.    MEDICATIONS:  MEDICATIONS  (STANDING):  chlorhexidine 2% Cloths 1 Application(s) Topical <User Schedule>  dextrose 5% + sodium chloride 0.9% with potassium chloride 20 mEq/L 1000 milliLiter(s) (40 mL/Hr) IV Continuous <Continuous>  enoxaparin Injectable 40 milliGRAM(s) SubCutaneous every 24 hours  influenza  Vaccine (HIGH DOSE) 0.5 milliLiter(s) IntraMuscular once  metoprolol tartrate Injectable 7.5 milliGRAM(s) IV Push every 6 hours      PHYSICAL EXAM:  T(C): 37.4 (09-29-24 @ 00:16), Max: 37.4 (09-29-24 @ 00:16)  HR: 104 (09-29-24 @ 00:16) (78 - 104)  BP: 156/76 (09-29-24 @ 00:16) (91/63 - 156/76)  RR: 18 (09-29-24 @ 00:16) (18 - 18)  SpO2: 96% (09-29-24 @ 00:16) (96% - 100%)  Wt(kg): --  I&O's Summary    27 Sep 2024 07:01  -  28 Sep 2024 07:00  --------------------------------------------------------  IN: 0 mL / OUT: 450 mL / NET: -450 mL    28 Sep 2024 07:01  -  29 Sep 2024 01:57  --------------------------------------------------------  IN: 0 mL / OUT: 150 mL / NET: -150 mL          Appearance: Normal	  HEENT:  PERRLA , ngt   Lymphatic: No lymphadenopathy   Cardiovascular: Normal S1 S2, no JVD  Respiratory: normal effort , clear  Gastrointestinal:  Soft, Non-tender  Skin: No rashes,  warm to touch  Psychiatry:  Mood & affect appropriate  Musculuskeletal: No edema    recent labs, Imaging and EKGs personally reviewed     09-27-24 @ 07:01  -  09-28-24 @ 07:00  --------------------------------------------------------  IN: 0 mL / OUT: 450 mL / NET: -450 mL    09-28-24 @ 07:01  -  09-29-24 @ 01:57  --------------------------------------------------------  IN: 0 mL / OUT: 150 mL / NET: -150 mL                          9.2    8.93  )-----------( 284      ( 28 Sep 2024 06:39 )             26.7               09-28    137  |  101  |  9   ----------------------------<  124[H]  3.9   |  24  |  0.53    Ca    8.7      28 Sep 2024 17:40  Phos  1.6     09-28  Mg     1.8     09-28                         Urinalysis Basic - ( 28 Sep 2024 17:40 )    Color: x / Appearance: x / SG: x / pH: x  Gluc: 124 mg/dL / Ketone: x  / Bili: x / Urobili: x   Blood: x / Protein: x / Nitrite: x   Leuk Esterase: x / RBC: x / WBC x   Sq Epi: x / Non Sq Epi: x / Bacteria: x

## 2024-09-28 NOTE — PROGRESS NOTE ADULT - SUBJECTIVE AND OBJECTIVE BOX
Surgery Progress Note    SUBJECTIVE: Pt seen and examined at bedside. NGT dislodged this AM, feeling nauseous. Has not passed flatus yet, reports one episode of small stool. One episode of tachycardia to 150.     Vital Signs Last 24 Hrs  T(C): 37 (28 Sep 2024 06:04), Max: 37 (28 Sep 2024 06:04)  T(F): 98.6 (28 Sep 2024 06:04), Max: 98.6 (28 Sep 2024 06:04)  HR: 91 (28 Sep 2024 06:04) (71 - 96)  BP: 137/81 (28 Sep 2024 06:04) (135/76 - 155/83)  BP(mean): --  RR: 18 (28 Sep 2024 06:04) (17 - 20)  SpO2: 97% (28 Sep 2024 06:04) (97% - 98%)    Parameters below as of 28 Sep 2024 06:04  Patient On (Oxygen Delivery Method): room air        Physical Exam:  General Appearance: Appears well, NAD  Respiratory: No labored breathing  CV: Pulse regularly present  Abdomen: Soft, nontender, nondistended, midline dressing c/d/i    LABS:                        9.2    8.93  )-----------( 284      ( 28 Sep 2024 06:39 )             26.7     09-28    136  |  100  |  6[L]  ----------------------------<  130[H]  2.7[LL]   |  22  |  0.38[L]    Ca    8.7      28 Sep 2024 06:39  Phos  1.6     09-28  Mg     1.8     09-28        Urinalysis Basic - ( 28 Sep 2024 06:39 )    Color: x / Appearance: x / SG: x / pH: x  Gluc: 130 mg/dL / Ketone: x  / Bili: x / Urobili: x   Blood: x / Protein: x / Nitrite: x   Leuk Esterase: x / RBC: x / WBC x   Sq Epi: x / Non Sq Epi: x / Bacteria: x        INs and OUTs:    09-27-24 @ 07:01  -  09-28-24 @ 07:00  --------------------------------------------------------  IN: 0 mL / OUT: 450 mL / NET: -450 mL

## 2024-09-28 NOTE — PROVIDER CONTACT NOTE (CRITICAL VALUE NOTIFICATION) - BACKGROUND
----- Message from HENRI Ocasio sent at 7/7/2022  8:29 AM CDT -----  Please assist patient with his COVID-19 quarantine questions.   See h&p.

## 2024-09-28 NOTE — CHART NOTE - NSCHARTNOTEFT_GEN_A_CORE
Tele: SR 70-80's, one brief run of PAT occurred at 0219 with HR 150bpm lasting 15 seconds. Continue beta blocker. Tele: SR 70-80's, one brief run of PAT occurred at 0219 with HR 150bpm lasting 15 seconds. Supplement to ensure K>4.0 and Mg >2.0. Continue beta blocker, can change to PO metoprolol tartrate 50mg BID when able to tolerate PO intake. EP will sign off, reconsult as needed.     PIERRE Castillo, NP-C  [phone]

## 2024-09-28 NOTE — PROGRESS NOTE ADULT - ASSESSMENT
Patient is a 78 year old woman with a PMHx of lifestyle-controlled hypertension, hyperlipidemia on rosuvastatin, and symptomatic hyponatremia with past falls from dizziness now on salt tablets, who presented to [facility] with a chief complaint of acute abdominal pain that reportedly caused her to become lightheaded and fall down. She denies head strike however reports brief episode of loss of consciousness ("passed out"). She states that prior to this syncopal episode she developed intense mid-abdominal pain associated with nonbloody, nonbilious emesis. Patient was found to have an SBO and is now S/P ex lap small bowel resection on 9/23/24, requiring levophed intraoperatively and SICU stay. Internal Medicine has been consulted on Ms. Treadwell's care for medical management.         Small Bowel Obstruction  - CT w/ 3 mm LLL Lung nodule, SBO w/ a closed loop appearance   - S/P OR on 9/23 for Ex lap with SBR and SICU stay on pressors    - Currently NPO w/ NGT; Monitor output   - Monitor for GI function   - Pain control   - Management as per Surgery   - anti nausea meds     Syncope, Tachycardia   - Per patient, she has a history of falls from hyponatremia   - Presented with fall 2/2 feeling lightheaded from abdominal pain - states she passed out with brief episode of loss of consciousness   - CT head Neg for acute findings   - On IV BB (In view of NPO status) as per EP   - Planned to be DC'd on event monitor as per EP   - Recommend to check TTE as part of work up   - Monitor on tele; Monitor and replete electrolytes to maintain K > 4 and Mg > 2   - Fall precautions  - EP Eval appreciated; F/u recs      Hyponatremia   - Pt with history of chronic symptomatic hyponatremia with falls from dizziness  - Monitor Na level; Avoid overcorrection > 6-8 mEq in 24 hours  - Trend daily labs  - Fall precautions    Leukocytosis  - Resolved, continue to monitor and trend   - UCx negative   - If febrile, check pan cultures    Hypokalemia   - Monitor and replete electrolytes PRN   - Maintain K > 4 and Mg > 2     HTN   - Per patient, her HTN is lifestyle controlled  - S/P Levophed S/P OR and SICU stay, now off  - Monitor BP, VS and adjust as tolerated     HLD   - Check Lipid panel     Lung nodule   - CT w/ CT w/ 3 mm LLL Lung nodule  - Outpatient PCP / Pulm f/u for pulm nodule management       PPX

## 2024-09-28 NOTE — PROGRESS NOTE ADULT - ASSESSMENT
78 year old woman presenting with a closed loop small bowel obstruction s/p ex lap sbr on 9/23/24, required levophed intraop, stopped shortly after in SICU and was downgraded to the floor.  Episode of SVT self resolving, having recurrent PATs, asymptomatic.    Plan:  - Con't NGT  - IVF - [id number]  - aggressive electrolyte repletions  - Pain control - tylenol, NSAIDs, opiates for breakthrough  - OOBAT  - DVT ppx with LVX   - Monitor vitals on tele  - Appreciate cards and medicine recs  - IV metoprolol 7.5mg q6h, per cards switch to PO 50mg BID when she can tolerate  - Event monitor upon discharge  - TTE per medicine    [facility]   [id number] 78 year old woman presenting with a closed loop small bowel obstruction s/p ex lap sbr on 9/23/24, required levophed intraop, stopped shortly after in SICU and was downgraded to the floor.  Episode of SVT self resolving, having recurrent PATs, asymptomatic.    Plan:  - Con't NGT  - IVF - [id number]  - aggressive electrolyte repletions  - Pain control - tylenol, NSAIDs, opiates for breakthrough  - OOBAT  - DVT ppx with LVX   - Monitor vitals on tele  - Appreciate cards and medicine recs  - IV metoprolol 7.5mg q6h, per cards switch to PO 50mg BID when she can tolerate  - Event monitor upon discharge  - TTE per medicine    Red Surgery   [id number]    Pt seen and examined with residents  agree with above   improving   possible dc ngt and adv to clears in am     Quinn Woodard MD

## 2024-09-29 LAB
ANION GAP SERPL CALC-SCNC: 13 MMOL/L — SIGNIFICANT CHANGE UP (ref 5–17)
BUN SERPL-MCNC: 8 MG/DL — SIGNIFICANT CHANGE UP (ref 7–23)
CALCIUM SERPL-MCNC: 8.6 MG/DL — SIGNIFICANT CHANGE UP (ref 8.4–10.5)
CHLORIDE SERPL-SCNC: 100 MMOL/L — SIGNIFICANT CHANGE UP (ref 96–108)
CO2 SERPL-SCNC: 25 MMOL/L — SIGNIFICANT CHANGE UP (ref 22–31)
CREAT SERPL-MCNC: 0.47 MG/DL — LOW (ref 0.5–1.3)
EGFR: 97 ML/MIN/1.73M2 — SIGNIFICANT CHANGE UP
GLUCOSE BLDC GLUCOMTR-MCNC: 122 MG/DL — HIGH (ref 70–99)
GLUCOSE BLDC GLUCOMTR-MCNC: 122 MG/DL — HIGH (ref 70–99)
GLUCOSE BLDC GLUCOMTR-MCNC: 133 MG/DL — HIGH (ref 70–99)
GLUCOSE BLDC GLUCOMTR-MCNC: 135 MG/DL — HIGH (ref 70–99)
GLUCOSE BLDC GLUCOMTR-MCNC: 206 MG/DL — HIGH (ref 70–99)
GLUCOSE SERPL-MCNC: 118 MG/DL — HIGH (ref 70–99)
HCT VFR BLD CALC: 26.5 % — LOW (ref 34.5–45)
HGB BLD-MCNC: 9.1 G/DL — LOW (ref 11.5–15.5)
MAGNESIUM SERPL-MCNC: 2 MG/DL — SIGNIFICANT CHANGE UP (ref 1.6–2.6)
MCHC RBC-ENTMCNC: 30.8 PG — SIGNIFICANT CHANGE UP (ref 27–34)
MCHC RBC-ENTMCNC: 34.3 GM/DL — SIGNIFICANT CHANGE UP (ref 32–36)
MCV RBC AUTO: 89.8 FL — SIGNIFICANT CHANGE UP (ref 80–100)
NRBC # BLD: 0 /100 WBCS — SIGNIFICANT CHANGE UP (ref 0–0)
PHOSPHATE SERPL-MCNC: 2.1 MG/DL — LOW (ref 2.5–4.5)
PLATELET # BLD AUTO: 296 K/UL — SIGNIFICANT CHANGE UP (ref 150–400)
POTASSIUM SERPL-MCNC: 3.1 MMOL/L — LOW (ref 3.5–5.3)
POTASSIUM SERPL-SCNC: 3.1 MMOL/L — LOW (ref 3.5–5.3)
RBC # BLD: 2.95 M/UL — LOW (ref 3.8–5.2)
RBC # FLD: 11.9 % — SIGNIFICANT CHANGE UP (ref 10.3–14.5)
SODIUM SERPL-SCNC: 138 MMOL/L — SIGNIFICANT CHANGE UP (ref 135–145)
WBC # BLD: 8.2 K/UL — SIGNIFICANT CHANGE UP (ref 3.8–10.5)
WBC # FLD AUTO: 8.2 K/UL — SIGNIFICANT CHANGE UP (ref 3.8–10.5)

## 2024-09-29 RX ORDER — METOPROLOL TARTRATE 50 MG
50 TABLET ORAL
Refills: 0 | Status: DISCONTINUED | OUTPATIENT
Start: 2024-09-29 | End: 2024-10-01

## 2024-09-29 RX ADMIN — Medication 20 MILLIEQUIVALENT(S): at 17:11

## 2024-09-29 RX ADMIN — Medication 20 MILLIEQUIVALENT(S): at 14:08

## 2024-09-29 RX ADMIN — Medication 20 MILLIEQUIVALENT(S): at 16:00

## 2024-09-29 RX ADMIN — ENOXAPARIN SODIUM 40 MILLIGRAM(S): 150 INJECTION SUBCUTANEOUS at 17:10

## 2024-09-29 RX ADMIN — Medication 7.5 MILLIGRAM(S): at 06:17

## 2024-09-29 RX ADMIN — Medication 7.5 MILLIGRAM(S): at 00:33

## 2024-09-29 RX ADMIN — Medication 50 MILLIGRAM(S): at 17:11

## 2024-09-29 NOTE — PROVIDER CONTACT NOTE (OTHER) - RECOMMENDATIONS
provider made aware, scheduled 12pm metoprolol given
provider made aware, continue with 1PM scheduled cbc ?
EKG
Provider notified & aware.
None
Notify provider.

## 2024-09-29 NOTE — PROVIDER CONTACT NOTE (OTHER) - SITUATION
Pt tachycardic (104bpm)
 /86
Tele episode PAT 5.4 seconds and tachy to 150s
patient reports having 2 liquid/loose red dark BMS this morning
29 Beats wide complex had reported by Tele tech
tele episode PAT 3.5 seconds and tachy to 130s

## 2024-09-29 NOTE — PROVIDER CONTACT NOTE (OTHER) - BACKGROUND
admitted for surgery
SBO , Syncope
admitted for surg
Pt s/p ex lap, KAR, SBR.
78 year old female admitted for SBO; S/p syncope and collapse
Jamarcus, KAR & SBR

## 2024-09-29 NOTE — PROVIDER CONTACT NOTE (OTHER) - DATE AND TIME:
27-Sep-2024 00:55
25-Sep-2024 20:45
29-Sep-2024 02:40
25-Sep-2024 09:25
26-Sep-2024 12:55
26-Sep-2024 13:05
patient

## 2024-09-29 NOTE — PROVIDER CONTACT NOTE (OTHER) - REASON
29 beats wide complex
tele episode PAT 3.5 seconds and tachy to 130s
 /86
Pt tachycardic
Tele episode PAT 5.4 seconds and tachy to 150s
patient reports having 2 liquid/loose red dark BMS this morning

## 2024-09-29 NOTE — PROGRESS NOTE ADULT - ASSESSMENT
Patient is a 78 year old woman with a PMHx of lifestyle-controlled hypertension, hyperlipidemia on rosuvastatin, and symptomatic hyponatremia with past falls from dizziness now on salt tablets, who presented to [facility] with a chief complaint of acute abdominal pain that reportedly caused her to become lightheaded and fall down. She denies head strike however reports brief episode of loss of consciousness ("passed out"). She states that prior to this syncopal episode she developed intense mid-abdominal pain associated with nonbloody, nonbilious emesis. Patient was found to have an SBO and is now S/P ex lap small bowel resection on 9/23/24, requiring levophed intraoperatively and SICU stay. Internal Medicine has been consulted on Ms. Treadwell's care for medical management.         Small Bowel Obstruction  - CT w/ 3 mm LLL Lung nodule, SBO w/ a closed loop appearance   - S/P OR on 9/23 for Ex lap with SBR and SICU stay on pressors    - Currently NPO w/ NGT; Monitor output   - Monitor for GI function   - Pain control   - Management as per Surgery   - anti nausea meds     Syncope, Tachycardia   - Per patient, she has a history of falls from hyponatremia   - Presented with fall 2/2 feeling lightheaded from abdominal pain - states she passed out with brief episode of loss of consciousness   - CT head Neg for acute findings   - On IV BB (In view of NPO status) as per EP   - Planned to be DC'd on event monitor as per EP   - Recommend to check TTE as part of work up   - Monitor on tele; Monitor and replete electrolytes to maintain K > 4 and Mg > 2   - Fall precautions  - EP Eval appreciated; F/u recs      Hyponatremia   - Pt with history of chronic symptomatic hyponatremia with falls from dizziness  - Monitor Na level; Avoid overcorrection > 6-8 mEq in 24 hours  - Trend daily labs  - Fall precautions    Leukocytosis  - Resolved, continue to monitor and trend   - UCx negative   - If febrile, check pan cultures    Hypokalemia   - Monitor and replete electrolytes PRN   - Maintain K > 4 and Mg > 2     HTN   - Per patient, her HTN is lifestyle controlled  - S/P Levophed S/P OR and SICU stay, now off  - Monitor BP, VS and adjust as tolerated     HLD   - Check Lipid panel     Lung nodule   - CT w/ CT w/ 3 mm LLL Lung nodule  - Outpatient PCP / Pulm f/u for pulm nodule management       PPX

## 2024-09-29 NOTE — PROVIDER CONTACT NOTE (OTHER) - ACTION/TREATMENT ORDERED:
no further actions, will come assess patient shortly
continue with ordered lab work,. no further interventions at this time
EKG (SVT/ Tele)
Provider notified. Standing IV metoprolol given. Continue with plan of care.
Provider aware, 500ml bolus ordered & administered; EKG ordered & completed/saved in pt charts. Plan of care continues.
Tele remote remains in place. No complaints offered.

## 2024-09-29 NOTE — PROGRESS NOTE ADULT - SUBJECTIVE AND OBJECTIVE BOX
Name of Patient : BATOOL FRANCES  MRN: [id number]      Subjective: Patient seen and examined. No new events except as noted.     REVIEW OF SYSTEMS:    CONSTITUTIONAL: No weakness, fevers or chills  EYES/ENT: No visual changes;  No vertigo or throat pain   NECK: No pain or stiffness  RESPIRATORY: No cough, wheezing, hemoptysis; No shortness of breath  CARDIOVASCULAR: No chest pain or palpitations  GASTROINTESTINAL: No abdominal or epigastric pain. No nausea, vomiting, or hematemesis; No diarrhea or constipation. No melena or hematochezia.  GENITOURINARY: No dysuria, frequency or hematuria  NEUROLOGICAL: No numbness or weakness  SKIN: No itching, burning, rashes, or lesions   All other review of systems is negative unless indicated above.    MEDICATIONS:  MEDICATIONS  (STANDING):  chlorhexidine 2% Cloths 1 Application(s) Topical <User Schedule>  dextrose 5% + sodium chloride 0.9% with potassium chloride 20 mEq/L 1000 milliLiter(s) (40 mL/Hr) IV Continuous <Continuous>  enoxaparin Injectable 40 milliGRAM(s) SubCutaneous every 24 hours  influenza  Vaccine (HIGH DOSE) 0.5 milliLiter(s) IntraMuscular once  metoprolol tartrate 50 milliGRAM(s) Oral two times a day      PHYSICAL EXAM:  T(C): 36.6 (09-29-24 @ 21:17), Max: 37.2 (09-29-24 @ 02:32)  HR: 81 (09-29-24 @ 21:17) (80 - 93)  BP: 129/74 (09-29-24 @ 21:17) (111/68 - 152/72)  RR: 18 (09-29-24 @ 21:17) (18 - 18)  SpO2: 99% (09-29-24 @ 21:17) (96% - 99%)  Wt(kg): --  I&O's Summary    28 Sep 2024 07:01  -  29 Sep 2024 07:00  --------------------------------------------------------  IN: 0 mL / OUT: 200 mL / NET: -200 mL    29 Sep 2024 07:01  -  30 Sep 2024 00:53  --------------------------------------------------------  IN: 320 mL / OUT: 0 mL / NET: 320 mL          Appearance: Normal	  HEENT:  PERRLA   Lymphatic: No lymphadenopathy   Cardiovascular: Normal S1 S2, no JVD  Respiratory: normal effort , clear  Gastrointestinal:  Soft, Non-tender  Skin: No rashes,  warm to touch  Psychiatry:  Mood & affect appropriate  Musculuskeletal: No edema    recent labs, Imaging and EKGs personally reviewed             09-28-24 @ 07:01  -  09-29-24 @ 07:00  --------------------------------------------------------  IN: 0 mL / OUT: 200 mL / NET: -200 mL    09-29-24 @ 07:01  -  09-30-24 @ 00:53  --------------------------------------------------------  IN: 320 mL / OUT: 0 mL / NET: 320 mL

## 2024-09-29 NOTE — PROGRESS NOTE ADULT - SUBJECTIVE AND OBJECTIVE BOX
78y Female  Post operative day number 5 sbr    T(C): 36.5 (09-29-24 @ 14:24), Max: 37.4 (09-29-24 @ 00:16)  HR: 80 (09-29-24 @ 14:24) (80 - 104)  BP: 111/68 (09-29-24 @ 14:24) (111/68 - 156/76)  RR: 18 (09-29-24 @ 14:24) (18 - 18)  SpO2: 97% (09-29-24 @ 14:24) (96% - 100%)  Wt(kg): --    09-28 @ 07:01  -  09-29 @ 07:00  --------------------------------------------------------  IN: 0 mL / OUT: 200 mL / NET: -200 mL                            9.1    8.20  )-----------( 296      ( 29 Sep 2024 06:31 )             26.5     09-29    138  |  100  |  8   ----------------------------<  118[H]  3.1[L]   |  25  |  0.47[L]    Ca    8.6      29 Sep 2024 06:32  Phos  2.1     09-29  Mg     2.0     09-29          chlorhexidine 2% Cloths 1 Application(s) Topical <User Schedule>  dextrose 5% + sodium chloride 0.9% with potassium chloride 20 mEq/L 1000 milliLiter(s) IV Continuous <Continuous>  enoxaparin Injectable 40 milliGRAM(s) SubCutaneous every 24 hours  HYDROmorphone  Injectable 0.25 milliGRAM(s) IV Push every 3 hours PRN  influenza  Vaccine (HIGH DOSE) 0.5 milliLiter(s) IntraMuscular once  metoprolol tartrate 50 milliGRAM(s) Oral two times a day  ondansetron Injectable 4 milliGRAM(s) IV Push every 8 hours PRN  potassium chloride    Tablet ER 20 milliEquivalent(s) Oral every 2 hours    Physical exam  abd soft benign    Plan   advance diet  replace potassium  possible dc in am     Quinn Woodard MD FACS [facility]  [phone] office

## 2024-09-29 NOTE — PROGRESS NOTE ADULT - SUBJECTIVE AND OBJECTIVE BOX
SUBJECTIVE:  Patient seen and examined at bedside.  Reports pain is controlled.  Denies nausea, vomiting  Is passing gas and having bowel movements, denies diarrhea  Ambulating independently    OBJECTIVE:  Vital Signs Last 24 Hrs  T(C): 36.8 (30 Sep 2024 01:14), Max: 36.8 (30 Sep 2024 01:14)  T(F): 98.2 (30 Sep 2024 01:14), Max: 98.2 (30 Sep 2024 01:14)  HR: 91 (30 Sep 2024 01:14) (80 - 93)  BP: 130/78 (30 Sep 2024 01:14) (111/68 - 152/72)  BP(mean): --  RR: 18 (30 Sep 2024 01:14) (18 - 18)  SpO2: 97% (30 Sep 2024 01:14) (96% - 99%)    Parameters below as of 30 Sep 2024 01:14  Patient On (Oxygen Delivery Method): room air        Physical Examination:  General Appearance: Appears well, NAD  Respiratory: No labored breathing  CV: Pulse regularly present  Abdomen: Soft, nontender, nondistended, midline dressing c/d/i

## 2024-09-29 NOTE — PROGRESS NOTE ADULT - ASSESSMENT
78 year old woman presenting with a closed loop small bowel obstruction s/p ex lap sbr on 9/23/24, required levophed intraop, stopped shortly after in SICU and was downgraded to the floor.  Episode of SVT self resolving, having recurrent PATs, asymptomatic.    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

## 2024-09-29 NOTE — PROVIDER CONTACT NOTE (OTHER) - ASSESSMENT
pt A&O x4, vss, denies any dizziness or sob at this time
A&O X4, patient ambulating in hallway at this time, denies any heart palpitations or sob. no pain
A&Ox4, Pt remains neurologically stable. Pt denies chest pain or palpitations. Pt sleeping at this time.
Pt was sleeping, Pt denies chest pain.
HR: 104bpm; all other VSS.

## 2024-09-30 ENCOUNTER — TRANSCRIPTION ENCOUNTER (OUTPATIENT)
Age: 79
End: 2024-09-30

## 2024-09-30 DIAGNOSIS — R25.2 CRAMP AND SPASM: ICD-10-CM

## 2024-09-30 DIAGNOSIS — E87.1 HYPO-OSMOLALITY AND HYPONATREMIA: ICD-10-CM

## 2024-09-30 LAB
ANION GAP SERPL CALC-SCNC: 10 MMOL/L — SIGNIFICANT CHANGE UP (ref 5–17)
ANION GAP SERPL CALC-SCNC: 11 MMOL/L — SIGNIFICANT CHANGE UP (ref 5–17)
BUN SERPL-MCNC: 7 MG/DL — SIGNIFICANT CHANGE UP (ref 7–23)
BUN SERPL-MCNC: 7 MG/DL — SIGNIFICANT CHANGE UP (ref 7–23)
CALCIUM SERPL-MCNC: 8.5 MG/DL — SIGNIFICANT CHANGE UP (ref 8.4–10.5)
CALCIUM SERPL-MCNC: 8.6 MG/DL — SIGNIFICANT CHANGE UP (ref 8.4–10.5)
CHLORIDE SERPL-SCNC: 92 MMOL/L — LOW (ref 96–108)
CHLORIDE SERPL-SCNC: 96 MMOL/L — SIGNIFICANT CHANGE UP (ref 96–108)
CO2 SERPL-SCNC: 24 MMOL/L — SIGNIFICANT CHANGE UP (ref 22–31)
CO2 SERPL-SCNC: 26 MMOL/L — SIGNIFICANT CHANGE UP (ref 22–31)
CREAT SERPL-MCNC: 0.39 MG/DL — LOW (ref 0.5–1.3)
CREAT SERPL-MCNC: 0.54 MG/DL — SIGNIFICANT CHANGE UP (ref 0.5–1.3)
EGFR: 102 ML/MIN/1.73M2 — SIGNIFICANT CHANGE UP
EGFR: 94 ML/MIN/1.73M2 — SIGNIFICANT CHANGE UP
GLUCOSE BLDC GLUCOMTR-MCNC: 114 MG/DL — HIGH (ref 70–99)
GLUCOSE BLDC GLUCOMTR-MCNC: 120 MG/DL — HIGH (ref 70–99)
GLUCOSE SERPL-MCNC: 105 MG/DL — HIGH (ref 70–99)
GLUCOSE SERPL-MCNC: 108 MG/DL — HIGH (ref 70–99)
HCT VFR BLD CALC: 25.7 % — LOW (ref 34.5–45)
HGB BLD-MCNC: 8.6 G/DL — LOW (ref 11.5–15.5)
MAGNESIUM SERPL-MCNC: 1.8 MG/DL — SIGNIFICANT CHANGE UP (ref 1.6–2.6)
MCHC RBC-ENTMCNC: 30.2 PG — SIGNIFICANT CHANGE UP (ref 27–34)
MCHC RBC-ENTMCNC: 33.5 GM/DL — SIGNIFICANT CHANGE UP (ref 32–36)
MCV RBC AUTO: 90.2 FL — SIGNIFICANT CHANGE UP (ref 80–100)
NRBC # BLD: 0 /100 WBCS — SIGNIFICANT CHANGE UP (ref 0–0)
PHOSPHATE SERPL-MCNC: 1.9 MG/DL — LOW (ref 2.5–4.5)
PLATELET # BLD AUTO: 316 K/UL — SIGNIFICANT CHANGE UP (ref 150–400)
POTASSIUM SERPL-MCNC: 3.3 MMOL/L — LOW (ref 3.5–5.3)
POTASSIUM SERPL-MCNC: 3.7 MMOL/L — SIGNIFICANT CHANGE UP (ref 3.5–5.3)
POTASSIUM SERPL-SCNC: 3.3 MMOL/L — LOW (ref 3.5–5.3)
POTASSIUM SERPL-SCNC: 3.7 MMOL/L — SIGNIFICANT CHANGE UP (ref 3.5–5.3)
RBC # BLD: 2.85 M/UL — LOW (ref 3.8–5.2)
RBC # FLD: 12.1 % — SIGNIFICANT CHANGE UP (ref 10.3–14.5)
SODIUM SERPL-SCNC: 129 MMOL/L — LOW (ref 135–145)
SODIUM SERPL-SCNC: 130 MMOL/L — LOW (ref 135–145)
WBC # BLD: 9.95 K/UL — SIGNIFICANT CHANGE UP (ref 3.8–10.5)
WBC # FLD AUTO: 9.95 K/UL — SIGNIFICANT CHANGE UP (ref 3.8–10.5)

## 2024-09-30 RX ORDER — SODIUM PHOSPHATE IN D5W 15MMOL/250
30 PLASTIC BAG, INJECTION (ML) INTRAVENOUS ONCE
Refills: 0 | Status: COMPLETED | OUTPATIENT
Start: 2024-09-30 | End: 2024-09-30

## 2024-09-30 RX ORDER — METOPROLOL TARTRATE 50 MG
1 TABLET ORAL
Qty: 60 | Refills: 0
Start: 2024-09-30 | End: 2024-10-29

## 2024-09-30 RX ORDER — SODIUM CHLORIDE 0.9 % (FLUSH) 0.9 %
1 SYRINGE (ML) INJECTION THREE TIMES A DAY
Refills: 0 | Status: DISCONTINUED | OUTPATIENT
Start: 2024-09-30 | End: 2024-10-01

## 2024-09-30 RX ADMIN — Medication 1 GRAM(S): at 13:36

## 2024-09-30 RX ADMIN — Medication 1 GRAM(S): at 21:54

## 2024-09-30 RX ADMIN — POTASSIUM CHLORIDE, SODIUM CHLORIDE, CALCIUM CHLORIDE, SODIUM LACTATE, AND DEXTROSE MONOHYDRATE 40 MILLILITER(S): 1.79; 6; .2; 3.1; 5 INJECTION, SOLUTION INTRAVENOUS at 06:22

## 2024-09-30 RX ADMIN — Medication 85 MILLIMOLE(S): at 09:33

## 2024-09-30 RX ADMIN — ENOXAPARIN SODIUM 40 MILLIGRAM(S): 150 INJECTION SUBCUTANEOUS at 17:38

## 2024-09-30 RX ADMIN — Medication 40 MILLIEQUIVALENT(S): at 17:33

## 2024-09-30 RX ADMIN — Medication 1 GRAM(S): at 09:34

## 2024-09-30 RX ADMIN — CHLORHEXIDINE GLUCONATE ORAL RINSE 1 APPLICATION(S): 1.2 SOLUTION DENTAL at 06:22

## 2024-09-30 RX ADMIN — Medication 20 MILLIEQUIVALENT(S): at 09:34

## 2024-09-30 RX ADMIN — Medication 50 MILLIGRAM(S): at 06:22

## 2024-09-30 RX ADMIN — Medication 50 MILLIGRAM(S): at 17:33

## 2024-09-30 NOTE — PROGRESS NOTE ADULT - ASSESSMENT
Patient is a 78 year old woman with a PMHx of lifestyle-controlled hypertension, hyperlipidemia on rosuvastatin, and symptomatic hyponatremia with past falls from dizziness now on salt tablets, who presented to [facility] with a chief complaint of acute abdominal pain that reportedly caused her to become lightheaded and fall down. She denies head strike however reports brief episode of loss of consciousness ("passed out"). She states that prior to this syncopal episode she developed intense mid-abdominal pain associated with nonbloody, nonbilious emesis. Patient was found to have an SBO and is now S/P ex lap small bowel resection on 9/23/24, requiring levophed intraoperatively and SICU stay. Internal Medicine has been consulted on Ms. Treadwell's care for medical management.         Small Bowel Obstruction  - CT w/ 3 mm LLL Lung nodule, SBO w/ a closed loop appearance   - S/P OR on 9/23 for Ex lap with SBR and SICU stay on pressors    - Currently on limited diet; monitor    - Monitor for GI function   - Pain control   - Management as per Surgery   - Anti nausea meds   - per surgery     Syncope, Tachycardia   - Per patient, she has a history of falls from hyponatremia   - Presented with fall 2/2 feeling lightheaded from abdominal pain - states she passed out with brief episode of loss of consciousness   - CT head Neg for acute findings   - On IV BB (In view of NPO status) as per EP   - Planned to be DC'd on event monitor as per EP   - TTE pending    - Monitor on tele; Monitor and replete electrolytes to maintain K > 4 and Mg > 2   - Fall precautions  - EP Eval appreciated; F/u recs      Hyponatremia   - Pt with history of chronic symptomatic hyponatremia with falls from dizziness  - Monitor Na level; Avoid overcorrection > 6-8 mEq in 24 hours  - Trend daily labs  - Fall precautions  - na down-trending. D5 DC'd. Home Sodium Chloride tablets resumed. Monitor     Leukocytosis  - Resolved, continue to monitor and trend   - UCx negative   - If febrile, check pan cultures    Hypokalemia   - Monitor and replete electrolytes PRN   - Maintain K > 4 and Mg > 2     HTN   - Per patient, her HTN is lifestyle controlled  - S/P Levophed S/P OR and SICU stay, now off  - Monitor BP, VS and adjust as tolerated     HLD   - Check Lipid panel     Lung nodule   - CT w/ CT w/ 3 mm LLL Lung nodule  - Outpatient PCP / Pulm f/u for pulm nodule management       PPX      Discussed with Attending

## 2024-09-30 NOTE — PROGRESS NOTE ADULT - SUBJECTIVE AND OBJECTIVE BOX
Name of Patient : BATOOL FRANCES  MRN: [id number]  Date of visit: 09-30-24 @ 15:07      Subjective: Patient seen and examined. No new events except as noted.   Patient seen earlier this AM. Lying down in bed. Reports episode of diarrhea this AM. Admits to passing of flatus  IVF w/ D5 DC'd. Salt tablets resumed. TTE pending    REVIEW OF SYSTEMS:    CONSTITUTIONAL: Generalized weakness   EYES/ENT: No visual changes;  No vertigo or throat pain   NECK: No pain or stiffness  RESPIRATORY: No cough, wheezing, hemoptysis; No shortness of breath  CARDIOVASCULAR: No chest pain or palpitations  GASTROINTESTINAL: + Diarrhea; No abdominal or epigastric pain. No nausea, vomiting, or hematemesis; No diarrhea or constipation. No melena or hematochezia.  GENITOURINARY: No dysuria, frequency or hematuria  NEUROLOGICAL: No numbness or weakness  SKIN: No itching, burning, rashes, or lesions   All other review of systems is negative unless indicated above.    MEDICATIONS:  MEDICATIONS  (STANDING):  chlorhexidine 2% Cloths 1 Application(s) Topical <User Schedule>  enoxaparin Injectable 40 milliGRAM(s) SubCutaneous every 24 hours  influenza  Vaccine (HIGH DOSE) 0.5 milliLiter(s) IntraMuscular once  metoprolol tartrate 50 milliGRAM(s) Oral two times a day  sodium chloride 1 Gram(s) Oral three times a day      PHYSICAL EXAM:  T(C): 36.6 (09-30-24 @ 13:32), Max: 36.8 (09-30-24 @ 01:14)  HR: 95 (09-30-24 @ 13:32) (81 - 97)  BP: 104/61 (09-30-24 @ 13:32) (104/61 - 148/78)  RR: 18 (09-30-24 @ 13:32) (18 - 18)  SpO2: 98% (09-30-24 @ 13:32) (96% - 99%)  Wt(kg): --  I&O's Summary    29 Sep 2024 07:01  -  30 Sep 2024 07:00  --------------------------------------------------------  IN: 640 mL / OUT: 400 mL / NET: 240 mL    30 Sep 2024 07:01  -  30 Sep 2024 15:07  --------------------------------------------------------  IN: 480 mL / OUT: 450 mL / NET: 30 mL          Appearance: Awake, lying down in bed 	  HEENT:  Eyes are open   Lymphatic: No lymphadenopathy grossly   Cardiovascular: Normal    Respiratory: normal effort , clear  Gastrointestinal:  Soft, Non-tender  Skin: No rashes,  warm to touch  Psychiatry:  Mood & affect appropriate  Musculoskeletal: No edema             09-29-24 @ 07:01  -  09-30-24 @ 07:00  --------------------------------------------------------  IN: 640 mL / OUT: 400 mL / NET: 240 mL    09-30-24 @ 07:01  -  09-30-24 @ 15:07  --------------------------------------------------------  IN: 480 mL / OUT: 450 mL / NET: 30 mL                            8.6    9.95  )-----------( 316      ( 30 Sep 2024 07:14 )             25.7               09-30    130[L]  |  96  |  7   ----------------------------<  108[H]  3.7   |  24  |  0.39[L]    Ca    8.5      30 Sep 2024 07:10  Phos  1.9     09-30  Mg     1.8     09-30                         Urinalysis Basic - ( 30 Sep 2024 07:10 )    Color: x / Appearance: x / SG: x / pH: x  Gluc: 108 mg/dL / Ketone: x  / Bili: x / Urobili: x   Blood: x / Protein: x / Nitrite: x   Leuk Esterase: x / RBC: x / WBC x   Sq Epi: x / Non Sq Epi: x / Bacteria: x

## 2024-09-30 NOTE — PROGRESS NOTE ADULT - ATTENDING COMMENTS
DATE OF SERVICE: 09-26-24 @ 15:41    Feels well, HR better  Labs generally stable, electrolyte abnormalities this AM repleted  Abd soft NT/ND  Passed some gas this afternoon, 2bm this morning    Clamp trial NGT, possibly DC later today vs in AM  OOB  Appreciate cards/medicine input
DATE OF SERVICE: 09-30-24 @ 12:55    Having bowel fx  Abd soft nt/nd    Reg diet today  DC plan today vs tomorrow
DATE OF SERVICE: 09-25-24 @ 15:26    SVT this AM with HR to 160, resolved now HR in low 100s  No complaints, feels some heart racing  No flatus  Abd soft nt/nd  labs appropriate    Cards eval  NGT for now, await bowel fx  tele floor
DATE OF SERVICE: 09-27-24 @ 16:14    Nauseous during clamp trial, unclamped with bilious output  HR in 90s  WBC 11; hypokalemic  Abd soft NT/ND    1L LR Bolus  OOB and ambulate  NGT for now, await add'l bowel fx

## 2024-09-30 NOTE — DISCHARGE NOTE PROVIDER - HOSPITAL COURSE
Ms. Treadwell is a 78 year old woman with a history of lifestyle-controlled hypertension, hyperlipidemia on rosuvastatin, and symptomatic hyponatremia with past falls from dizziness now on salt tablets, presenting with acute abdominal pain that caused her to become lightheaded and fall down. She denies head strike but endorses brief loss of consciousness ("passed out"). She states that prior to this syncopal episode she developed intense mid-abdominal pain associated with nonbloody, nonbilious emesis. She denies any recent fevers, chills, chest pain, shortness of breath, melena, hematochezia, hematuria, dysuria, or oliguria. She last passed gas this morning and had a bowel movement last night.     In the ED she is hemodynamically stable and in no acute distress. Laboratory studies are notable for leukocytosis to 18.61 and elevated venous blood lactate to 2.7 CT demonstrates a closed loop small bowel obstruction.   On 9/23, pt was taken emergently to OR to ex lap, small bowel resection.  She tolerated the procedure well, was extubated and sent to PACU  in  stable condition. The patient was hemodynamically stable and was transferred to a surgical floor. The patient's pain was controlled by IV pain medications and then by PO pain medications.  POD 2 Pt had tachycardia and palpitations, ECG noted to be SVT. EP consulted for further input. She was monitored on telemetry, was started on low dose metoprolol and EP recommended event monitor recorder for 30 days after discharge.  Medicine was consulted for comanagement.        The patient was advanced from clears to regular diet and tolerated it well.  The patient is ambulating, voiding, tolerating a regular diet, and pain is controlled with oral pain medications.  Patient is stable for discharge home. Ms. Treadwell is a 78 year old woman with a history of lifestyle-controlled hypertension, hyperlipidemia on rosuvastatin, and symptomatic hyponatremia with past falls from dizziness now on salt tablets, presenting with acute abdominal pain that caused her to become lightheaded and fall down. She denies head strike but endorses brief loss of consciousness ("passed out"). She states that prior to this syncopal episode she developed intense mid-abdominal pain associated with nonbloody, nonbilious emesis. She denies any recent fevers, chills, chest pain, shortness of breath, melena, hematochezia, hematuria, dysuria, or oliguria. She last passed gas this morning and had a bowel movement last night.     In the ED she is hemodynamically stable and in no acute distress. Laboratory studies are notable for leukocytosis to 18.61 and elevated venous blood lactate to 2.7 CT demonstrates a closed loop small bowel obstruction.   On 9/23, pt was taken emergently to OR to ex lap, small bowel resection.  She tolerated the procedure well, was extubated and sent to PACU  in  stable condition. The patient was hemodynamically stable and was transferred to a surgical floor. The patient's pain was controlled by IV pain medications and then by PO pain medications.  POD 2 Pt had tachycardia and palpitations, ECG noted to be SVT. EP consulted for further input. She was monitored on telemetry, was started on low dose metoprolol and EP recommended event monitor recorder for 28 days after discharge.  Medicine was consulted for comanagement.        The patient was advanced from clears to regular diet and tolerated it well.  The patient is ambulating, voiding, tolerating a regular diet, and pain is controlled with oral pain medications.  Patient is stable for discharge home. Ms. Treadwell is a 78 year old woman with a history of lifestyle-controlled hypertension, hyperlipidemia on rosuvastatin, and symptomatic hyponatremia with past falls from dizziness now on salt tablets, presenting with acute abdominal pain that caused her to become lightheaded and fall down. She denies head strike but endorses brief loss of consciousness ("passed out"). She states that prior to this syncopal episode she developed intense mid-abdominal pain associated with nonbloody, nonbilious emesis. She denies any recent fevers, chills, chest pain, shortness of breath, melena, hematochezia, hematuria, dysuria, or oliguria. She last passed gas this morning and had a bowel movement last night.     In the ED she is hemodynamically stable and in no acute distress. Laboratory studies are notable for leukocytosis to 18.61 and elevated venous blood lactate to 2.7 CT demonstrates a closed loop small bowel obstruction.   On 9/23, pt was taken emergently to OR to ex lap, small bowel resection.  She tolerated the procedure well, was extubated and sent to PACU  in  stable condition. The patient was hemodynamically stable and was transferred to a surgical floor. The patient's pain was controlled by IV pain medications and then by PO pain medications.  POD 2 Pt had tachycardia and palpitations, ECG noted to be SVT. EP consulted for further input. She was monitored on telemetry, was started on low dose metoprolol and EP recommended event monitor recorder for 28 days after discharge.  Medicine was consulted for comanagement.  Pt was hyponatremic, salt tabs were restarted and she will be discharged home on salt tabs 3 times daily.  She was evaluated by PT who determined she had no PT needs.  The patient was advanced from clears to regular diet and tolerated it well.  The patient is ambulating, voiding, tolerating a regular diet, and pain is controlled with oral pain medications.  Patient is stable for discharge home. Ms. Treadwell is a 78 year old woman with a history of lifestyle-controlled hypertension, hyperlipidemia on rosuvastatin, and symptomatic hyponatremia with past falls from dizziness now on salt tablets, presenting with acute abdominal pain that caused her to become lightheaded and fall down. She denies head strike but endorses brief loss of consciousness ("passed out"). She states that prior to this syncopal episode she developed intense mid-abdominal pain associated with nonbloody, nonbilious emesis. She denies any recent fevers, chills, chest pain, shortness of breath, melena, hematochezia, hematuria, dysuria, or oliguria. She last passed gas this morning and had a bowel movement last night.     In the ED she is hemodynamically stable and in no acute distress. Laboratory studies are notable for leukocytosis to 18.61 and elevated venous blood lactate to 2.7 CT demonstrates a closed loop small bowel obstruction.   On 9/23, pt was taken emergently to OR to ex lap, small bowel resection.  She tolerated the procedure well, was extubated and sent to PACU  in  stable condition. The patient was hemodynamically stable and was transferred to a surgical floor. The patient's pain was controlled by IV pain medications and then by PO pain medications.  POD 2 Pt had tachycardia and palpitations, ECG noted to be SVT. EP consulted for further input. She was monitored on telemetry, was started on low dose metoprolol and EP recommended event monitor recorder for 28 days after discharge.  Medicine was consulted for comanagement.  Pt was hyponatremic, salt tabs were restarted and she will be discharged home on salt tabs 3 times daily.  She was evaluated by PT who determined she had no PT needs.  The patient was advanced from clears to regular diet and tolerated it well.  The patient is ambulating, voiding, tolerating a regular diet, and pain is controlled with oral pain medications.  Patient is stable for discharge home and will follow-up with Dr. Glass, cardiology, and her PMD within 1-2 weeks.

## 2024-09-30 NOTE — DISCHARGE NOTE PROVIDER - CARE PROVIDERS DIRECT ADDRESSES
,DirectAddress_Unknown ,DirectAddress_Unknown,[email or web address]

## 2024-09-30 NOTE — PROGRESS NOTE ADULT - ASSESSMENT
78 year old woman presenting with a closed loop small bowel obstruction s/p ex lap sbr on 9/23/24, required levophed intraop, stopped shortly after in SICU and was downgraded to the floor.  Episode of SVT self resolving, having recurrent PATs, asymptomatic.    Plan:  - LRD today  - Dc planning  - IVF - [id number]  - aggressive electrolyte repletions  - Pain control - tylenol, NSAIDs, opiates for breakthrough  - OOBAT  - DVT ppx with LVX   - Monitor vitals on tele  - Appreciate cards and medicine recs  - PO 50mg BID when she can tolerate  - Event monitor upon discharge  - TTE per medicine

## 2024-09-30 NOTE — DISCHARGE NOTE PROVIDER - NSDCFUSCHEDAPPT_GEN_ALL_CORE_FT
Ana Laura Johnston  [facility]  INTMED  [facility]   Scheduled Appointment: 11/20/2024     [facility]  ELECTROPH [address]  Scheduled Appointment: 10/15/2024    Ana Laura Johnston  [facility]  INTMED  [facility]   Scheduled Appointment: 11/20/2024

## 2024-09-30 NOTE — PROGRESS NOTE ADULT - SUBJECTIVE AND OBJECTIVE BOX
SUBJECTIVE:  Patient seen and examined at bedside.  Reports pain is controlled.  Denies nausea, vomiting  Seems down about not having a bowel movement yet today, however she has been passing gas and stool for the past couple days  Tolerating clears  Ambulating independently    OBJECTIVE:  Vital Signs Last 24 Hrs  T(C): 36.7 (30 Sep 2024 05:54), Max: 36.8 (30 Sep 2024 01:14)  T(F): 98.1 (30 Sep 2024 05:54), Max: 98.2 (30 Sep 2024 01:14)  HR: 97 (30 Sep 2024 05:54) (80 - 97)  BP: 122/77 (30 Sep 2024 05:54) (111/68 - 148/78)  BP(mean): --  RR: 18 (30 Sep 2024 05:54) (18 - 18)  SpO2: 96% (30 Sep 2024 05:54) (96% - 99%)    Parameters below as of 30 Sep 2024 05:54  Patient On (Oxygen Delivery Method): room air        Physical Examination:  General Appearance: Appears well, NAD  Respiratory: No labored breathing  CV: Pulse regularly present  Abdomen: Soft, nontender, nondistended, midline dressing c/d/i

## 2024-09-30 NOTE — DISCHARGE NOTE PROVIDER - CARE PROVIDER_API CALL
Albert Glass (DO)  Surgery  [address] [facility] [address], [address]  [place] [address]  Phone: [phone]  Fax: [phone]  Follow Up Time: 1 week   Albert Glass (DO)  Surgery  [address] [facility], [address]  [place] [address]  Phone: [phone]  Fax: [phone]  Follow Up Time: 1 week    Hever Womack  Cardiac Electrophysiology  [address] [place] [id number]  Phone: [phone]  Fax: [phone]  Follow Up Time: 1 week

## 2024-09-30 NOTE — DISCHARGE NOTE PROVIDER - NSDCMRMEDTOKEN_GEN_ALL_CORE_FT
rosuvastatin 5 mg oral tablet: 1 tab(s) orally once a day (at bedtime)  sodium chloride: 1 gram(s) 3 times a day 1 gram sodium chloride tablet TID for hyponatremia.   metoprolol tartrate 50 mg oral tablet: 1 tab(s) orally 2 times a day  rosuvastatin 5 mg oral tablet: 1 tab(s) orally once a day (at bedtime)  sodium chloride: 1 gram(s) 3 times a day 1 gram sodium chloride tablet TID for hyponatremia.

## 2024-09-30 NOTE — DISCHARGE NOTE PROVIDER - PROVIDER TOKENS
PROVIDER:[TOKEN:[[id number]],FOLLOWUP:[1 week]] PROVIDER:[TOKEN:[[id number]],FOLLOWUP:[1 week]],PROVIDER:[TOKEN:[[id number]],FOLLOWUP:[1 week]]

## 2024-10-01 ENCOUNTER — APPOINTMENT (OUTPATIENT)
Dept: ELECTROPHYSIOLOGY | Facility: CLINIC | Age: 79
End: 2024-10-01

## 2024-10-01 ENCOUNTER — TRANSCRIPTION ENCOUNTER (OUTPATIENT)
Age: 79
End: 2024-10-01

## 2024-10-01 ENCOUNTER — NON-APPOINTMENT (OUTPATIENT)
Age: 79
End: 2024-10-01

## 2024-10-01 VITALS
DIASTOLIC BLOOD PRESSURE: 75 MMHG | RESPIRATION RATE: 18 BRPM | OXYGEN SATURATION: 99 % | HEART RATE: 86 BPM | SYSTOLIC BLOOD PRESSURE: 123 MMHG | TEMPERATURE: 98 F

## 2024-10-01 LAB
ANION GAP SERPL CALC-SCNC: 10 MMOL/L — SIGNIFICANT CHANGE UP (ref 5–17)
BUN SERPL-MCNC: 5 MG/DL — LOW (ref 7–23)
CALCIUM SERPL-MCNC: 8.6 MG/DL — SIGNIFICANT CHANGE UP (ref 8.4–10.5)
CHLORIDE SERPL-SCNC: 98 MMOL/L — SIGNIFICANT CHANGE UP (ref 96–108)
CO2 SERPL-SCNC: 24 MMOL/L — SIGNIFICANT CHANGE UP (ref 22–31)
CREAT SERPL-MCNC: 0.47 MG/DL — LOW (ref 0.5–1.3)
EGFR: 97 ML/MIN/1.73M2 — SIGNIFICANT CHANGE UP
GLUCOSE SERPL-MCNC: 106 MG/DL — HIGH (ref 70–99)
HCT VFR BLD CALC: 25.2 % — LOW (ref 34.5–45)
HGB BLD-MCNC: 8.7 G/DL — LOW (ref 11.5–15.5)
MAGNESIUM SERPL-MCNC: 1.6 MG/DL — SIGNIFICANT CHANGE UP (ref 1.6–2.6)
MCHC RBC-ENTMCNC: 31.2 PG — SIGNIFICANT CHANGE UP (ref 27–34)
MCHC RBC-ENTMCNC: 34.5 GM/DL — SIGNIFICANT CHANGE UP (ref 32–36)
MCV RBC AUTO: 90.3 FL — SIGNIFICANT CHANGE UP (ref 80–100)
NRBC # BLD: 0 /100 WBCS — SIGNIFICANT CHANGE UP (ref 0–0)
PHOSPHATE SERPL-MCNC: 3 MG/DL — SIGNIFICANT CHANGE UP (ref 2.5–4.5)
PLATELET # BLD AUTO: 348 K/UL — SIGNIFICANT CHANGE UP (ref 150–400)
POTASSIUM SERPL-MCNC: 3.6 MMOL/L — SIGNIFICANT CHANGE UP (ref 3.5–5.3)
POTASSIUM SERPL-SCNC: 3.6 MMOL/L — SIGNIFICANT CHANGE UP (ref 3.5–5.3)
RBC # BLD: 2.79 M/UL — LOW (ref 3.8–5.2)
RBC # FLD: 12.1 % — SIGNIFICANT CHANGE UP (ref 10.3–14.5)
SODIUM SERPL-SCNC: 132 MMOL/L — LOW (ref 135–145)
WBC # BLD: 8.6 K/UL — SIGNIFICANT CHANGE UP (ref 3.8–10.5)
WBC # FLD AUTO: 8.6 K/UL — SIGNIFICANT CHANGE UP (ref 3.8–10.5)

## 2024-10-01 RX ORDER — MAGNESIUM SULFATE 500 MG/ML
2 VIAL (ML) INJECTION ONCE
Refills: 0 | Status: COMPLETED | OUTPATIENT
Start: 2024-10-01 | End: 2024-10-01

## 2024-10-01 RX ADMIN — Medication 50 MILLIGRAM(S): at 05:20

## 2024-10-01 RX ADMIN — Medication 40 MILLIEQUIVALENT(S): at 08:31

## 2024-10-01 RX ADMIN — Medication 25 GRAM(S): at 08:31

## 2024-10-01 RX ADMIN — CHLORHEXIDINE GLUCONATE ORAL RINSE 1 APPLICATION(S): 1.2 SOLUTION DENTAL at 05:20

## 2024-10-01 RX ADMIN — Medication 1 GRAM(S): at 13:26

## 2024-10-01 RX ADMIN — Medication 1 GRAM(S): at 05:20

## 2024-10-01 NOTE — PROGRESS NOTE ADULT - REASON FOR ADMISSION
Closed loop small bowel obstruction

## 2024-10-01 NOTE — PROGRESS NOTE ADULT - SUBJECTIVE AND OBJECTIVE BOX
SURGERY DAILY PROGRESS NOTE:       Subjective / Overnight events:  No acute overnight events.  Tolerating diet, denies N/V.  +flatus, +BM.  Pain controlled.      Objective:      MEDICATIONS  (STANDING):  chlorhexidine 2% Cloths 1 Application(s) Topical <User Schedule>  enoxaparin Injectable 40 milliGRAM(s) SubCutaneous every 24 hours  influenza  Vaccine (HIGH DOSE) 0.5 milliLiter(s) IntraMuscular once  metoprolol tartrate 50 milliGRAM(s) Oral two times a day  sodium chloride 1 Gram(s) Oral three times a day    MEDICATIONS  (PRN):  ondansetron Injectable 4 milliGRAM(s) IV Push every 8 hours PRN Nausea and/or Vomiting      Vital Signs Last 24 Hrs  T(C): 36.5 (01 Oct 2024 09:14), Max: 37.1 (01 Oct 2024 01:48)  T(F): 97.7 (01 Oct 2024 09:14), Max: 98.8 (01 Oct 2024 01:48)  HR: 84 (01 Oct 2024 09:14) (83 - 99)  BP: 116/75 (01 Oct 2024 09:14) (104/61 - 124/75)  BP(mean): --  RR: 18 (01 Oct 2024 09:14) (17 - 18)  SpO2: 99% (01 Oct 2024 09:14) (95% - 99%)    Parameters below as of 01 Oct 2024 09:14  Patient On (Oxygen Delivery Method): room air        I&O's Detail    30 Sep 2024 07:01  -  01 Oct 2024 07:00  --------------------------------------------------------  IN:    IV PiggyBack: 500 mL    Oral Fluid: 1480 mL  Total IN: 1980 mL    OUT:    Voided (mL): 1250 mL  Total OUT: 1250 mL    Total NET: 730 mL      01 Oct 2024 07:01  -  01 Oct 2024 11:08  --------------------------------------------------------  IN:    IV PiggyBack: 50 mL    Oral Fluid: 120 mL  Total IN: 170 mL    OUT:  Total OUT: 0 mL    Total NET: 170 mL          Daily     Daily     LABS:                        8.7    8.60  )-----------( 348      ( 01 Oct 2024 06:05 )             25.2     10-01    132[L]  |  98  |  5[L]  ----------------------------<  106[H]  3.6   |  24  |  0.47[L]    Ca    8.6      01 Oct 2024 06:05  Phos  3.0     10-01  Mg     1.6     10-01        Urinalysis Basic - ( 01 Oct 2024 06:05 )    Color: x / Appearance: x / SG: x / pH: x  Gluc: 106 mg/dL / Ketone: x  / Bili: x / Urobili: x   Blood: x / Protein: x / Nitrite: x   Leuk Esterase: x / RBC: x / WBC x   Sq Epi: x / Non Sq Epi: x / Bacteria: x            	  Physical Examination:  General Appearance: Appears well, NAD  Respiratory: No labored breathing  CV: Pulse regularly present  Abdomen: Soft, nontender, nondistended, midline dressing c/d/i

## 2024-10-01 NOTE — PROGRESS NOTE ADULT - SUBJECTIVE AND OBJECTIVE BOX
Name of Patient : BATOOL FRANCES  MRN: [id number]        Subjective: Patient seen and examined. No new events except as noted.     REVIEW OF SYSTEMS:    CONSTITUTIONAL: No weakness, fevers or chills  EYES/ENT: No visual changes;  No vertigo or throat pain   NECK: No pain or stiffness  RESPIRATORY: No cough, wheezing, hemoptysis; No shortness of breath  CARDIOVASCULAR: No chest pain or palpitations  GASTROINTESTINAL: No abdominal or epigastric pain. No nausea, vomiting, or hematemesis; No diarrhea or constipation. No melena or hematochezia.  GENITOURINARY: No dysuria, frequency or hematuria  NEUROLOGICAL: No numbness or weakness  SKIN: No itching, burning, rashes, or lesions   All other review of systems is negative unless indicated above.    MEDICATIONS:  MEDICATIONS  (STANDING):  chlorhexidine 2% Cloths 1 Application(s) Topical <User Schedule>  enoxaparin Injectable 40 milliGRAM(s) SubCutaneous every 24 hours  influenza  Vaccine (HIGH DOSE) 0.5 milliLiter(s) IntraMuscular once  metoprolol tartrate 50 milliGRAM(s) Oral two times a day  sodium chloride 1 Gram(s) Oral three times a day      PHYSICAL EXAM:  T(C): 36.6 (10-01-24 @ 13:32), Max: 37.1 (10-01-24 @ 01:48)  HR: 86 (10-01-24 @ 13:32) (84 - 99)  BP: 123/75 (10-01-24 @ 13:32) (116/75 - 124/75)  RR: 18 (10-01-24 @ 13:32) (18 - 18)  SpO2: 99% (10-01-24 @ 13:32) (95% - 99%)  Wt(kg): --  I&O's Summary    30 Sep 2024 07:01  -  01 Oct 2024 07:00  --------------------------------------------------------  IN: 1980 mL / OUT: 1250 mL / NET: 730 mL    01 Oct 2024 07:01  -  02 Oct 2024 00:52  --------------------------------------------------------  IN: 290 mL / OUT: 0 mL / NET: 290 mL          Appearance: Normal	  HEENT:  PERRLA   Lymphatic: No lymphadenopathy   Cardiovascular: Normal S1 S2, no JVD  Respiratory: normal effort , clear  Gastrointestinal:  Soft, Non-tender  Skin: No rashes,  warm to touch  Psychiatry:  Mood & affect appropriate  Musculuskeletal: No edema    recent labs, Imaging and EKGs personally reviewed     09-30-24 @ 07:01  -  10-01-24 @ 07:00  --------------------------------------------------------  IN: 1980 mL / OUT: 1250 mL / NET: 730 mL    10-01-24 @ 07:01  -  10-02-24 @ 00:52  --------------------------------------------------------  IN: 290 mL / OUT: 0 mL / NET: 290 mL                          8.7    8.60  )-----------( 348      ( 01 Oct 2024 06:05 )             25.2               10-01    132[L]  |  98  |  5[L]  ----------------------------<  106[H]  3.6   |  24  |  0.47[L]    Ca    8.6      01 Oct 2024 06:05  Phos  3.0     10-01  Mg     1.6     10-01                         Urinalysis Basic - ( 01 Oct 2024 06:05 )    Color: x / Appearance: x / SG: x / pH: x  Gluc: 106 mg/dL / Ketone: x  / Bili: x / Urobili: x   Blood: x / Protein: x / Nitrite: x   Leuk Esterase: x / RBC: x / WBC x   Sq Epi: x / Non Sq Epi: x / Bacteria: x

## 2024-10-01 NOTE — PROGRESS NOTE ADULT - ASSESSMENT
Patient is a 78 year old woman with a PMHx of lifestyle-controlled hypertension, hyperlipidemia on rosuvastatin, and symptomatic hyponatremia with past falls from dizziness now on salt tablets, who presented to [facility] with a chief complaint of acute abdominal pain that reportedly caused her to become lightheaded and fall down. She denies head strike however reports brief episode of loss of consciousness ("passed out"). She states that prior to this syncopal episode she developed intense mid-abdominal pain associated with nonbloody, nonbilious emesis. Patient was found to have an SBO and is now S/P ex lap small bowel resection on 9/23/24, requiring levophed intraoperatively and SICU stay. Internal Medicine has been consulted on Ms. Treadwell's care for medical management.         Small Bowel Obstruction  - CT w/ 3 mm LLL Lung nodule, SBO w/ a closed loop appearance   - S/P OR on 9/23 for Ex lap with SBR and SICU stay on pressors    - Currently on limited diet; monitor    - Monitor for GI function   - Pain control   - Management as per Surgery   - Anti nausea meds   - per surgery     Syncope, Tachycardia   - Per patient, she has a history of falls from hyponatremia   - Presented with fall 2/2 feeling lightheaded from abdominal pain - states she passed out with brief episode of loss of consciousness   - CT head Neg for acute findings   - On IV BB (In view of NPO status) as per EP   - Planned to be DC'd on event monitor as per EP   - TTE pending    - Monitor on tele; Monitor and replete electrolytes to maintain K > 4 and Mg > 2   - Fall precautions  - EP Eval appreciated; F/u recs      Hyponatremia   - Pt with history of chronic symptomatic hyponatremia with falls from dizziness  - Monitor Na level; Avoid overcorrection > 6-8 mEq in 24 hours  - Trend daily labs  - Fall precautions  - na down-trending. D5 DC'd. Home Sodium Chloride tablets resumed. Monitor     Leukocytosis  - Resolved, continue to monitor and trend   - UCx negative   - If febrile, check pan cultures    Hypokalemia   - Monitor and replete electrolytes PRN   - Maintain K > 4 and Mg > 2     HTN   - Per patient, her HTN is lifestyle controlled  - S/P Levophed S/P OR and SICU stay, now off  - Monitor BP, VS and adjust as tolerated     HLD   - Check Lipid panel     Lung nodule   - CT w/ CT w/ 3 mm LLL Lung nodule  - Outpatient PCP / Pulm f/u for pulm nodule management       PPX

## 2024-10-01 NOTE — DISCHARGE NOTE NURSING/CASE MANAGEMENT/SOCIAL WORK - PATIENT PORTAL LINK FT
You can access the FollowMyHealth Patient Portal offered by [facility] by registering at the following website: [email or web address]. By joining [employer]’s FollowMyHealth portal, you will also be able to view your health information using other applications (apps) compatible with our system.

## 2024-10-01 NOTE — PROGRESS NOTE ADULT - PROVIDER SPECIALTY LIST ADULT
Internal Medicine
Internal Medicine
Surgery
Surgery
Electrophysiology
Electrophysiology
Internal Medicine
Internal Medicine
Colorectal Surgery
Colorectal Surgery
Electrophysiology
Surgery
Internal Medicine
Surgery

## 2024-10-02 ENCOUNTER — APPOINTMENT (OUTPATIENT)
Dept: OTOLARYNGOLOGY | Facility: CLINIC | Age: 79
End: 2024-10-02

## 2024-10-04 ENCOUNTER — NON-APPOINTMENT (OUTPATIENT)
Age: 79
End: 2024-10-04

## 2024-10-05 ENCOUNTER — EMERGENCY (EMERGENCY)
Facility: HOSPITAL | Age: 79
LOS: 1 days | Discharge: HOME | End: 2024-10-05
Attending: STUDENT IN AN ORGANIZED HEALTH CARE EDUCATION/TRAINING PROGRAM
Payer: MEDICARE

## 2024-10-05 VITALS
DIASTOLIC BLOOD PRESSURE: 72 MMHG | OXYGEN SATURATION: 99 % | HEIGHT: 62 IN | WEIGHT: 115.08 LBS | RESPIRATION RATE: 17 BRPM | SYSTOLIC BLOOD PRESSURE: 110 MMHG | TEMPERATURE: 97 F | HEART RATE: 101 BPM

## 2024-10-05 LAB
ALBUMIN SERPL ELPH-MCNC: 3.8 G/DL — SIGNIFICANT CHANGE UP (ref 3.3–5)
ALP SERPL-CCNC: 84 U/L — SIGNIFICANT CHANGE UP (ref 40–120)
ALT FLD-CCNC: 15 U/L — SIGNIFICANT CHANGE UP (ref 10–45)
ANION GAP SERPL CALC-SCNC: 12 MMOL/L — SIGNIFICANT CHANGE UP (ref 5–17)
APPEARANCE UR: CLEAR — SIGNIFICANT CHANGE UP
AST SERPL-CCNC: 26 U/L — SIGNIFICANT CHANGE UP (ref 10–40)
BACTERIA # UR AUTO: NEGATIVE /HPF — SIGNIFICANT CHANGE UP
BASOPHILS # BLD AUTO: 0.05 K/UL — SIGNIFICANT CHANGE UP (ref 0–0.2)
BASOPHILS NFR BLD AUTO: 0.4 % — SIGNIFICANT CHANGE UP (ref 0–2)
BILIRUB SERPL-MCNC: 0.3 MG/DL — SIGNIFICANT CHANGE UP (ref 0.2–1.2)
BILIRUB UR-MCNC: NEGATIVE — SIGNIFICANT CHANGE UP
BUN SERPL-MCNC: 10 MG/DL — SIGNIFICANT CHANGE UP (ref 7–23)
CALCIUM SERPL-MCNC: 9.7 MG/DL — SIGNIFICANT CHANGE UP (ref 8.4–10.5)
CAST: 0 /LPF — SIGNIFICANT CHANGE UP (ref 0–4)
CHLORIDE SERPL-SCNC: 94 MMOL/L — LOW (ref 96–108)
CO2 SERPL-SCNC: 23 MMOL/L — SIGNIFICANT CHANGE UP (ref 22–31)
COLOR SPEC: YELLOW — SIGNIFICANT CHANGE UP
CREAT SERPL-MCNC: 0.54 MG/DL — SIGNIFICANT CHANGE UP (ref 0.5–1.3)
DIFF PNL FLD: NEGATIVE — SIGNIFICANT CHANGE UP
EGFR: 94 ML/MIN/1.73M2 — SIGNIFICANT CHANGE UP
EOSINOPHIL # BLD AUTO: 0.06 K/UL — SIGNIFICANT CHANGE UP (ref 0–0.5)
EOSINOPHIL NFR BLD AUTO: 0.5 % — SIGNIFICANT CHANGE UP (ref 0–6)
GAS PNL BLDV: SIGNIFICANT CHANGE UP
GLUCOSE SERPL-MCNC: 109 MG/DL — HIGH (ref 70–99)
GLUCOSE UR QL: NEGATIVE MG/DL — SIGNIFICANT CHANGE UP
HCT VFR BLD CALC: 28.6 % — LOW (ref 34.5–45)
HGB BLD-MCNC: 9.5 G/DL — LOW (ref 11.5–15.5)
IMM GRANULOCYTES NFR BLD AUTO: 0.8 % — SIGNIFICANT CHANGE UP (ref 0–0.9)
KETONES UR-MCNC: NEGATIVE MG/DL — SIGNIFICANT CHANGE UP
LEUKOCYTE ESTERASE UR-ACNC: NEGATIVE — SIGNIFICANT CHANGE UP
LYMPHOCYTES # BLD AUTO: 17.2 % — SIGNIFICANT CHANGE UP (ref 13–44)
LYMPHOCYTES # BLD AUTO: 2.17 K/UL — SIGNIFICANT CHANGE UP (ref 1–3.3)
MAGNESIUM SERPL-MCNC: 1.9 MG/DL — SIGNIFICANT CHANGE UP (ref 1.6–2.6)
MCHC RBC-ENTMCNC: 30.8 PG — SIGNIFICANT CHANGE UP (ref 27–34)
MCHC RBC-ENTMCNC: 33.2 GM/DL — SIGNIFICANT CHANGE UP (ref 32–36)
MCV RBC AUTO: 92.9 FL — SIGNIFICANT CHANGE UP (ref 80–100)
MONOCYTES # BLD AUTO: 1.09 K/UL — HIGH (ref 0–0.9)
MONOCYTES NFR BLD AUTO: 8.6 % — SIGNIFICANT CHANGE UP (ref 2–14)
NEUTROPHILS # BLD AUTO: 9.16 K/UL — HIGH (ref 1.8–7.4)
NEUTROPHILS NFR BLD AUTO: 72.5 % — SIGNIFICANT CHANGE UP (ref 43–77)
NITRITE UR-MCNC: NEGATIVE — SIGNIFICANT CHANGE UP
NRBC # BLD: 0 /100 WBCS — SIGNIFICANT CHANGE UP (ref 0–0)
OSMOLALITY UR: 208 MOS/KG — LOW (ref 300–900)
PH UR: 7 — SIGNIFICANT CHANGE UP (ref 5–8)
PHOSPHATE SERPL-MCNC: 3.5 MG/DL — SIGNIFICANT CHANGE UP (ref 2.5–4.5)
PLATELET # BLD AUTO: 522 K/UL — HIGH (ref 150–400)
POTASSIUM SERPL-MCNC: 4.4 MMOL/L — SIGNIFICANT CHANGE UP (ref 3.5–5.3)
POTASSIUM SERPL-SCNC: 4.4 MMOL/L — SIGNIFICANT CHANGE UP (ref 3.5–5.3)
PROT SERPL-MCNC: 7.1 G/DL — SIGNIFICANT CHANGE UP (ref 6–8.3)
PROT UR-MCNC: NEGATIVE MG/DL — SIGNIFICANT CHANGE UP
RBC # BLD: 3.08 M/UL — LOW (ref 3.8–5.2)
RBC # FLD: 13 % — SIGNIFICANT CHANGE UP (ref 10.3–14.5)
RBC CASTS # UR COMP ASSIST: 0 /HPF — SIGNIFICANT CHANGE UP (ref 0–4)
SODIUM SERPL-SCNC: 129 MMOL/L — LOW (ref 135–145)
SODIUM UR-SCNC: 69 MMOL/L — SIGNIFICANT CHANGE UP
SP GR SPEC: <1.005 — LOW (ref 1–1.03)
SQUAMOUS # UR AUTO: 0 /HPF — SIGNIFICANT CHANGE UP (ref 0–5)
TROPONIN T, HIGH SENSITIVITY RESULT: 10 NG/L — SIGNIFICANT CHANGE UP (ref 0–51)
TSH SERPL-MCNC: 1.84 UIU/ML — SIGNIFICANT CHANGE UP (ref 0.27–4.2)
UROBILINOGEN FLD QL: 0.2 MG/DL — SIGNIFICANT CHANGE UP (ref 0.2–1)
WBC # BLD: 12.63 K/UL — HIGH (ref 3.8–10.5)
WBC # FLD AUTO: 12.63 K/UL — HIGH (ref 3.8–10.5)
WBC UR QL: 0 /HPF — SIGNIFICANT CHANGE UP (ref 0–5)

## 2024-10-05 PROCEDURE — 71046 X-RAY EXAM CHEST 2 VIEWS: CPT | Mod: 26

## 2024-10-05 PROCEDURE — 99285 EMERGENCY DEPT VISIT HI MDM: CPT

## 2024-10-05 RX ORDER — SODIUM CHLORIDE IRRIG SOLUTION 0.9 %
1000 SOLUTION, IRRIGATION IRRIGATION ONCE
Refills: 0 | Status: COMPLETED | OUTPATIENT
Start: 2024-10-05 | End: 2024-10-05

## 2024-10-05 RX ORDER — SODIUM CHLORIDE 0.9 % (FLUSH) 0.9 %
1000 SYRINGE (ML) INJECTION ONCE
Refills: 0 | Status: COMPLETED | OUTPATIENT
Start: 2024-10-05 | End: 2024-10-05

## 2024-10-05 RX ADMIN — Medication 1000 MILLILITER(S): at 19:00

## 2024-10-05 RX ADMIN — Medication 1000 MILLILITER(S): at 17:20

## 2024-10-05 NOTE — ED PROVIDER NOTE - PROGRESS NOTE DETAILS
Patient with elevated white blood cell count of unclear etiology she did recently have surgery she is also possibly orthostatic she was started on metoprolol which could contribute to why she feels lightheaded or dizzy.  However given the elevated white blood cell count we will obtain a CAT scan of the abdomen looking for a possible surgical site infection. case signed out to Dr. Hollins CT without evidence of infection, patient feels well. Patient is concerned symptoms could be due to metoprolol that was started for SVT while inpatient, will follow up with cardiology. Surgery called, made aware patient is here. Pending repeat BMP for hyponatremia she will be okay for d/c

## 2024-10-05 NOTE — ED PROVIDER NOTE - NSFOLLOWUPINSTRUCTIONS_ED_ALL_ED_FT
You have been evaluated for lightheadedness. Your workup was not remarkable for any emergency condition. Your symptoms may be due to your medications, please follow up with your cardiologist regarding medication use.     Please return to Emergency Department immediately for any new, concerning, or worsening symptoms.     Please follow-up with as recommended.      Any results obtained today during your evaluation is attached and available in your portal. Please take all your results to follow up with your primary care doctor so that they can determine if you need any additional testing or treatment as an outpatient.     Please take prescriptions as discussed.

## 2024-10-05 NOTE — ED PROVIDER NOTE - PATIENT PORTAL LINK FT
You can access the FollowMyHealth Patient Portal offered by [facility] by registering at the following website: [email or web address]. By joining [employer]’s FollowMyHealth portal, you will also be able to view your health information using other applications (apps) compatible with our system.

## 2024-10-05 NOTE — ED PROVIDER NOTE - CLINICAL SUMMARY MEDICAL DECISION MAKING FREE TEXT BOX
78F with PMH of HLD, SVT p/w lightheadedness and palpitations intermittently since yesterday. Ddx includes elyte imbalance, post op complication, orthostatic hypotension, paroxysmal arrhythmia, dehydration, ACS etiology.

## 2024-10-05 NOTE — ED ADULT NURSE NOTE - CHIEF COMPLAINT QUOTE
lightheadedness x1 day, described as she feels like she is going to pass out, holter monitor, recent SBO admission

## 2024-10-05 NOTE — ED PROVIDER NOTE - ATTENDING CONTRIBUTION TO CARE
78-year-old female with history of SVT and hyperlipidemia on metoprolol 50 twice daily which she reports is a new medication for her since hospital discharge for small bowel obstruction that required ex lap on September 24 presents to the emergency department with lightheadedness.  She states that when going from sitting to standing she has worsening symptoms.  Patient with no fevers chills abdominal pain nausea vomiting back pain urinary frequency or urgency.  Patient states that she has been eating and drinking normally she last had a bowel movement today.  She states it was within normal limits.  On exam is accompanied by her sister she is in no acute distress she is ANO x 4 she has normal conjunctiva she has dry mucous membranes she has clear lungs to auscultation bilaterally heart is regular rate and rhythm abdomen is soft there is a midline surgical scar with staples there is no evidence of wound dehiscence.  Patient with no tenderness however does have a small nodular area along the most superior aspect of the scar site that patient has noted is new for her.  She has 5 out of 5 upper and lower extremity strength.  She has intact sensation bilateral arms and legs there is no facial droop.  She is ambulatory with steady gait she has intact finger-to-nose bilaterally.  She has no obvious rashes.  Patient will have labs IV fluids and reassessment.  Patient with possible orthostatic hypotension we will give IV hydration and reassess.  Patient was found to have a leukocytosis as a result given her recent surgery will obtain CT scan to assess for possible surgical cause of her luekocytosis

## 2024-10-05 NOTE — ED PROVIDER NOTE - OBJECTIVE STATEMENT
78F with PMH of HLD, SVT p/w lightheadedness and palpitations intermittently since yesterday. She was hospitalized recently for SBO s/p exploratory laporotomy and was discharged with a Holter monitor after having SVT, is followed by Dr. Womack. She has been on metoprolol succinate since then. Admits to having a bowel movement this morning and passing gas. Denies fever, chills, body aches, chest pain, SOB, back pain, flank pain, dysuria, polyuria, fatigue or malaise.

## 2024-10-05 NOTE — ED ADULT NURSE NOTE - OBJECTIVE STATEMENT
77 y/o F , AXOX4, with a PMH of HLD and bowel obstruction, BIB EMS for dizziness x 1 day. Pt was referred to the ED by UC. Pt reports dizziness upon standing. Pt reports that she wears a holter monitor and has not reveived any reports of abn rhytms. . Pt reports Pt endorsing  Pt found in gown on stretcher, breathing unlabored on room air, speaking in complete sentences, strong and equal strength in all extremities, sensations intact, abd soft non tender non distended, no edema noted. Safety and comfort measures maintained. 79 y/o F , AXOX4, with a PMH of HLD and bowel obstruction, BIB EMS for dizziness x 1 day. Pt was referred to the ED by UC. Pt reports dizziness upon standing. Pt reports that she wears a holter monitor and has not received any reports of abnormal rhythm from her MD. Pt reports dizziness began after her recent  bowel obstruction surg. Pt denies palpitations, chest pain, dyspnea, N/V or dizziness at rest. Pt breathing unlabored on room air, speaking in complete sentences, strong and equal strength in all extremities, sensations intact, abd soft non tender non distended, no edema noted. Safety and comfort measures maintained.

## 2024-10-05 NOTE — ED PROVIDER NOTE - NS ED ROS FT
CONSTITUTIONAL: No fevers, no chills, +lightheadedness, no dizziness  EYES: no visual changes, no eye pain  EARS: no ear drainage, no ear pain, no change in hearing  NOSE: no nasal congestion  MOUTH/THROAT: no sore throat  CV: No chest pain, +palpitations  RESP: No SOB, no cough  GI: No n/v/d, no abd pain  : no dysuria, no hematuria, no flank pain  MSK: no back pain, no extremity pain  SKIN: no rashes  NEURO: no headache, no focal weakness, no decreased sensation/parasthesias

## 2024-10-05 NOTE — ED CLERICAL - NS ED CLERK NOTE PRE-ARRIVAL INFORMATION; ADDITIONAL PRE-ARRIVAL INFORMATION
CC/Reason For referral: admitted on 9/23 for syncope and SBO, dizziness, weakness, palpitations x 2 days   Preferred Consultant(if applicable):   Who admits for you (if needed):  Do you have documents you would like to fax over?  Would you still like to speak to an ED attending? not at this time

## 2024-10-05 NOTE — ED PROVIDER NOTE - PHYSICAL EXAMINATION
Physical Exam:  Gen: NAD, AOx4, non-toxic appearing  HEENT: normal conjunctiva, tongue midline, oral mucosa dry  Lung: CTAB, no respiratory distress, speaking in full sentences  CV: RRR, no murmurs, rubs or gallops  Abd: soft, NT, ND, no CVA tenderness   MSK: no visible deformities, no back pain  Neuro: No focal sensory or motor deficits  Skin: Warm, well perfused

## 2024-10-06 VITALS
RESPIRATION RATE: 16 BRPM | DIASTOLIC BLOOD PRESSURE: 76 MMHG | TEMPERATURE: 98 F | OXYGEN SATURATION: 98 % | HEART RATE: 85 BPM | SYSTOLIC BLOOD PRESSURE: 122 MMHG

## 2024-10-06 DIAGNOSIS — R55 SYNCOPE AND COLLAPSE: ICD-10-CM

## 2024-10-06 LAB
ANION GAP SERPL CALC-SCNC: 10 MMOL/L — SIGNIFICANT CHANGE UP (ref 5–17)
BUN SERPL-MCNC: 6 MG/DL — LOW (ref 7–23)
CALCIUM SERPL-MCNC: 7.7 MG/DL — LOW (ref 8.4–10.5)
CHLORIDE SERPL-SCNC: 101 MMOL/L — SIGNIFICANT CHANGE UP (ref 96–108)
CO2 SERPL-SCNC: 19 MMOL/L — LOW (ref 22–31)
CREAT SERPL-MCNC: 0.43 MG/DL — LOW (ref 0.5–1.3)
EGFR: 99 ML/MIN/1.73M2 — SIGNIFICANT CHANGE UP
GLUCOSE SERPL-MCNC: 91 MG/DL — SIGNIFICANT CHANGE UP (ref 70–99)
OSMOLALITY SERPL: 272 MOSMOL/KG — LOW (ref 280–301)
POTASSIUM SERPL-MCNC: 3.7 MMOL/L — SIGNIFICANT CHANGE UP (ref 3.5–5.3)
POTASSIUM SERPL-SCNC: 3.7 MMOL/L — SIGNIFICANT CHANGE UP (ref 3.5–5.3)
SODIUM SERPL-SCNC: 130 MMOL/L — LOW (ref 135–145)

## 2024-10-06 PROCEDURE — 83930 ASSAY OF BLOOD OSMOLALITY: CPT

## 2024-10-06 PROCEDURE — 83935 ASSAY OF URINE OSMOLALITY: CPT

## 2024-10-06 PROCEDURE — 84295 ASSAY OF SERUM SODIUM: CPT

## 2024-10-06 PROCEDURE — 84132 ASSAY OF SERUM POTASSIUM: CPT

## 2024-10-06 PROCEDURE — 84300 ASSAY OF URINE SODIUM: CPT

## 2024-10-06 PROCEDURE — 82803 BLOOD GASES ANY COMBINATION: CPT

## 2024-10-06 PROCEDURE — 74177 CT ABD & PELVIS W/CONTRAST: CPT | Mod: MC

## 2024-10-06 PROCEDURE — 85014 HEMATOCRIT: CPT

## 2024-10-06 PROCEDURE — 84484 ASSAY OF TROPONIN QUANT: CPT

## 2024-10-06 PROCEDURE — 85018 HEMOGLOBIN: CPT

## 2024-10-06 PROCEDURE — 84443 ASSAY THYROID STIM HORMONE: CPT

## 2024-10-06 PROCEDURE — 83605 ASSAY OF LACTIC ACID: CPT

## 2024-10-06 PROCEDURE — 81001 URINALYSIS AUTO W/SCOPE: CPT

## 2024-10-06 PROCEDURE — 82330 ASSAY OF CALCIUM: CPT

## 2024-10-06 PROCEDURE — 74177 CT ABD & PELVIS W/CONTRAST: CPT | Mod: 26,MC

## 2024-10-06 PROCEDURE — 80053 COMPREHEN METABOLIC PANEL: CPT

## 2024-10-06 PROCEDURE — 36415 COLL VENOUS BLD VENIPUNCTURE: CPT

## 2024-10-06 PROCEDURE — 80048 BASIC METABOLIC PNL TOTAL CA: CPT

## 2024-10-06 PROCEDURE — 84100 ASSAY OF PHOSPHORUS: CPT

## 2024-10-06 PROCEDURE — 99285 EMERGENCY DEPT VISIT HI MDM: CPT | Mod: 25

## 2024-10-06 PROCEDURE — 71046 X-RAY EXAM CHEST 2 VIEWS: CPT

## 2024-10-06 PROCEDURE — 85025 COMPLETE CBC W/AUTO DIFF WBC: CPT

## 2024-10-06 PROCEDURE — 82947 ASSAY GLUCOSE BLOOD QUANT: CPT

## 2024-10-06 PROCEDURE — 83735 ASSAY OF MAGNESIUM: CPT

## 2024-10-06 PROCEDURE — 82435 ASSAY OF BLOOD CHLORIDE: CPT

## 2024-10-06 RX ADMIN — Medication 1000 MILLILITER(S): at 00:02

## 2024-10-06 NOTE — CONSULT NOTE ADULT - ASSESSMENT
Assessment: 78F recent SBR for SBO with known SVT on Holter presents with lightheadedness and palpitations that self resolved.     Recs:  - no surgical intervention indicated at this time   - Appt. with Dr. Glass in office this Thursday as scheduled  - workup of palpitations per ED/Medicine   - Dispo per ED  - D/w Dr. Maria Luisa Oates MD, PGY4  [facility]   [id number]

## 2024-10-06 NOTE — CONSULT NOTE ADULT - SUBJECTIVE AND OBJECTIVE BOX
SURGERY CONSULT NOTE  --------------------------------------------------------------------------------------------    Patient is a 78y old  Female who presents with a chief complaint of lightheadedness     HPI: 78F recently discharged after SBO ex lap SBR and anastomosis (dc on 10/1) sent out with normal diet and bowel function, and Holter monitor per cards for SVT. returns with lightheadedness and palpitations today, self limiting now resolved.  No nausea or vomiting, tolerating diet, passing gas and having stools. No fevers or chills. Wound without drainage or observed changes.       ROS: 10-system review is otherwise negative except HPI above.          FAMILY HISTORY:  No pertinent family history in first degree relatives        SOCIAL HISTORY:      ALLERGIES: No Known Allergies      HOME MEDICATIONS:     CURRENT MEDICATIONS  MEDICATIONS (STANDING):   MEDICATIONS (PRN):  --------------------------------------------------------------------------------------------    Vitals:   T(C): 36.6 (10-05-24 @ 23:59), Max: 36.6 (10-05-24 @ 23:59)  HR: 93 (10-05-24 @ 23:59) (93 - 101)  BP: 120/70 (10-05-24 @ 23:59) (110/72 - 120/70)  RR: 18 (10-05-24 @ 23:59) (17 - 18)  SpO2: 99% (10-05-24 @ 23:59) (99% - 99%)  CAPILLARY BLOOD GLUCOSE        CAPILLARY BLOOD GLUCOSE          Height (cm): 157.5 (10-05 @ 16:10)  Weight (kg): 52.2 (10-05 @ 16:10)  BMI (kg/m2): 21 (10-05 @ 16:10)  BSA (m2): 1.51 (10-05 @ 16:10)    PHYSICAL EXAM:   General: NAD, Lying in bed comfortably  Neuro: A+Ox3  HEENT: NC/AT, EOMI  Cardio: RRR, at radial pulse   Resp: Good effort, room air   GI/Abd: Soft, NT/ND, no rebound/guarding, no masses palpated, incision staples c/d/i  Musculoskeletal: All 4 extremities moving spontaneously, no limitations.  --------------------------------------------------------------------------------------------    LABS  CBC (10-05 @ 17:27)                              9.5[L]                         12.63[H]  )----------------(  522[H]     72.5  % Neutrophils, 17.2  % Lymphocytes, ANC: 9.16[H]                              28.6[L]    BMP (10-06 @ 04:07)             130[L]  |  101     |  6[L]  		Ca++ --      Ca 7.7[L]             ---------------------------------( 91    		Mg --                 3.7     |  19[L]   |  0.43[L]			Ph --      BMP (10-05 @ 17:27)             129[L]  |  94[L]   |  10    		Ca++ --      Ca 9.7                ---------------------------------( 109[H]		Mg 1.9                4.4     |  23      |  0.54  			Ph 3.5       LFTs (10-05 @ 17:27)      TPro 7.1 / Alb 3.8 / TBili 0.3 / DBili -- / AST 26 / ALT 15 / AlkPhos 84          VBG (10-05 @ 17:15)     7.34 / 47[H] / 26 / 25 / -0.6 / 37.4[L]%     Lactate: 1.3    --------------------------------------------------------------------------------------------    MICROBIOLOGY  Urinalysis (10-06 @ 04:07):     Color:  / Appearance:  / SG:  / pH:  / Gluc: 91 / Ketones:  / Bili:  / Urobili:  / Protein : / Nitrites:  / Leuk.Est:  / RBC:  / WBC:  / Sq Epi:  / Non Sq Epi:  / Bacteria          --------------------------------------------------------------------------------------------    IMAGING    < from: CT Abdomen and Pelvis w/ IV Cont (10.06.24 @ 01:41) >  COMPARISON: CT abdomen pelvis 9/20/2024.    CONTRAST/COMPLICATIONS:  IV Contrast: Omnipaque 350  90 mL administered   10 ml discarded.  Oral Contrast:  Complications:    PROCEDURE:  CT of the Abdomen and Pelvis was performed.  Sagittal and coronal reformats were performed.    FINDINGS:  LOWER CHEST: Within normal limits.    LIVER: Stable subcentimeter hypodense focus with calcification in segment   7..  BILE DUCTS: Normal caliber.  GALLBLADDER: Cholelithiasis.  SPLEEN: Within normal limits.  PANCREAS: Within normal limits.  ADRENALS: Within normal limits.  KIDNEYS/URETERS: Symmetric renal enhancement. No hydronephrosis. Right   renal cyst.    BLADDER: Right bladder diverticulum.  REPRODUCTIVE ORGANS: Uterus and adnexa within normal limits.    BOWEL: No bowel obstruction. Appendix is normal. Interval partial small   bowel resection. Diverticulosis coli.  PERITONEUM/RETROPERITONEUM: Within normal limits.  VESSELS: Atherosclerotic changes.  LYMPH NODES: No lymphadenopathy.  ABDOMINAL WALL: Postsurgical changes.  BONES: Degenerative changes.    IMPRESSION:  Interval partial small bowel resection. No bowel obstruction or evidence   of active bowel inflammation.    < end of copied text >

## 2024-10-06 NOTE — ED ADULT NURSE REASSESSMENT NOTE - NS ED NURSE REASSESS COMMENT FT1
Received patient from DELIA Almazan, patient at baseline mental status, able to make needs known, patient agreeable to plan of care, pending dispo, comfort and safety provided.

## 2024-10-08 ENCOUNTER — APPOINTMENT (OUTPATIENT)
Dept: INTERNAL MEDICINE | Facility: CLINIC | Age: 79
End: 2024-10-08

## 2024-10-08 ENCOUNTER — OUTPATIENT (OUTPATIENT)
Dept: OUTPATIENT SERVICES | Facility: HOSPITAL | Age: 79
LOS: 1 days | End: 2024-10-08

## 2024-10-08 VITALS
HEART RATE: 96 BPM | DIASTOLIC BLOOD PRESSURE: 60 MMHG | WEIGHT: 113 LBS | OXYGEN SATURATION: 99 % | SYSTOLIC BLOOD PRESSURE: 120 MMHG | BODY MASS INDEX: 20.8 KG/M2 | HEIGHT: 62 IN

## 2024-10-08 DIAGNOSIS — Z09 ENCOUNTER FOR FOLLOW-UP EXAMINATION AFTER COMPLETED TREATMENT FOR CONDITIONS OTHER THAN MALIGNANT NEOPLASM: ICD-10-CM

## 2024-10-08 DIAGNOSIS — I10 ESSENTIAL (PRIMARY) HYPERTENSION: ICD-10-CM

## 2024-10-08 DIAGNOSIS — Z87.19 PERSONAL HISTORY OF OTHER DISEASES OF THE DIGESTIVE SYSTEM: ICD-10-CM

## 2024-10-08 DIAGNOSIS — R91.1 SOLITARY PULMONARY NODULE: ICD-10-CM

## 2024-10-08 DIAGNOSIS — E87.1 HYPO-OSMOLALITY AND HYPONATREMIA: ICD-10-CM

## 2024-10-08 PROCEDURE — 99495 TRANSJ CARE MGMT MOD F2F 14D: CPT

## 2024-10-08 RX ORDER — METOPROLOL TARTRATE 50 MG/1
50 TABLET ORAL
Refills: 5 | Status: ACTIVE | COMMUNITY

## 2024-10-08 RX ORDER — NORMAL SALT TABLETS 1 G/G
1 TABLET ORAL 3 TIMES DAILY
Refills: 0 | Status: ACTIVE | COMMUNITY

## 2024-10-09 PROBLEM — Z78.9 OTHER SPECIFIED HEALTH STATUS: Chronic | Status: ACTIVE | Noted: 2024-10-05

## 2024-10-15 ENCOUNTER — APPOINTMENT (OUTPATIENT)
Dept: ELECTROPHYSIOLOGY | Facility: CLINIC | Age: 79
End: 2024-10-15

## 2024-10-15 PROCEDURE — 80053 COMPREHEN METABOLIC PANEL: CPT

## 2024-10-15 PROCEDURE — 93005 ELECTROCARDIOGRAM TRACING: CPT

## 2024-10-15 PROCEDURE — 82746 ASSAY OF FOLIC ACID SERUM: CPT

## 2024-10-15 PROCEDURE — 82728 ASSAY OF FERRITIN: CPT

## 2024-10-15 PROCEDURE — 85014 HEMATOCRIT: CPT

## 2024-10-15 PROCEDURE — 82435 ASSAY OF BLOOD CHLORIDE: CPT

## 2024-10-15 PROCEDURE — 86900 BLOOD TYPING SEROLOGIC ABO: CPT

## 2024-10-15 PROCEDURE — 83550 IRON BINDING TEST: CPT

## 2024-10-15 PROCEDURE — 96374 THER/PROPH/DIAG INJ IV PUSH: CPT

## 2024-10-15 PROCEDURE — 80048 BASIC METABOLIC PNL TOTAL CA: CPT

## 2024-10-15 PROCEDURE — 82330 ASSAY OF CALCIUM: CPT

## 2024-10-15 PROCEDURE — 83605 ASSAY OF LACTIC ACID: CPT

## 2024-10-15 PROCEDURE — P9045: CPT

## 2024-10-15 PROCEDURE — 74177 CT ABD & PELVIS W/CONTRAST: CPT | Mod: MC

## 2024-10-15 PROCEDURE — 81001 URINALYSIS AUTO W/SCOPE: CPT

## 2024-10-15 PROCEDURE — 88307 TISSUE EXAM BY PATHOLOGIST: CPT

## 2024-10-15 PROCEDURE — 84132 ASSAY OF SERUM POTASSIUM: CPT

## 2024-10-15 PROCEDURE — 82803 BLOOD GASES ANY COMBINATION: CPT

## 2024-10-15 PROCEDURE — 71045 X-RAY EXAM CHEST 1 VIEW: CPT

## 2024-10-15 PROCEDURE — 85027 COMPLETE CBC AUTOMATED: CPT

## 2024-10-15 PROCEDURE — 82962 GLUCOSE BLOOD TEST: CPT

## 2024-10-15 PROCEDURE — 84484 ASSAY OF TROPONIN QUANT: CPT

## 2024-10-15 PROCEDURE — 86901 BLOOD TYPING SEROLOGIC RH(D): CPT

## 2024-10-15 PROCEDURE — 36415 COLL VENOUS BLD VENIPUNCTURE: CPT

## 2024-10-15 PROCEDURE — 97161 PT EVAL LOW COMPLEX 20 MIN: CPT

## 2024-10-15 PROCEDURE — 84100 ASSAY OF PHOSPHORUS: CPT

## 2024-10-15 PROCEDURE — 82607 VITAMIN B-12: CPT

## 2024-10-15 PROCEDURE — 85730 THROMBOPLASTIN TIME PARTIAL: CPT

## 2024-10-15 PROCEDURE — 80061 LIPID PANEL: CPT

## 2024-10-15 PROCEDURE — 85610 PROTHROMBIN TIME: CPT

## 2024-10-15 PROCEDURE — 70450 CT HEAD/BRAIN W/O DYE: CPT | Mod: MC

## 2024-10-15 PROCEDURE — 97165 OT EVAL LOW COMPLEX 30 MIN: CPT

## 2024-10-15 PROCEDURE — 85025 COMPLETE CBC W/AUTO DIFF WBC: CPT

## 2024-10-15 PROCEDURE — 86850 RBC ANTIBODY SCREEN: CPT

## 2024-10-15 PROCEDURE — 83690 ASSAY OF LIPASE: CPT

## 2024-10-15 PROCEDURE — 96375 TX/PRO/DX INJ NEW DRUG ADDON: CPT

## 2024-10-15 PROCEDURE — 88302 TISSUE EXAM BY PATHOLOGIST: CPT

## 2024-10-15 PROCEDURE — 84295 ASSAY OF SERUM SODIUM: CPT

## 2024-10-15 PROCEDURE — 83540 ASSAY OF IRON: CPT

## 2024-10-15 PROCEDURE — 83735 ASSAY OF MAGNESIUM: CPT

## 2024-10-15 PROCEDURE — C1889: CPT

## 2024-10-15 PROCEDURE — 82947 ASSAY GLUCOSE BLOOD QUANT: CPT

## 2024-10-15 PROCEDURE — 84443 ASSAY THYROID STIM HORMONE: CPT

## 2024-10-15 PROCEDURE — C9399: CPT

## 2024-10-15 PROCEDURE — 87086 URINE CULTURE/COLONY COUNT: CPT

## 2024-10-15 PROCEDURE — 99285 EMERGENCY DEPT VISIT HI MDM: CPT | Mod: 25

## 2024-10-15 PROCEDURE — C1769: CPT

## 2024-10-15 PROCEDURE — 85018 HEMOGLOBIN: CPT

## 2024-10-17 LAB
ANION GAP SERPL CALC-SCNC: 11 MMOL/L
BASOPHILS # BLD AUTO: 0.05 K/UL
BASOPHILS NFR BLD AUTO: 0.6 %
BUN SERPL-MCNC: 15 MG/DL
CALCIUM SERPL-MCNC: 9.8 MG/DL
CHLORIDE SERPL-SCNC: 98 MMOL/L
CO2 SERPL-SCNC: 25 MMOL/L
CREAT SERPL-MCNC: 0.63 MG/DL
EGFR: 91 ML/MIN/1.73M2
EOSINOPHIL # BLD AUTO: 0.08 K/UL
EOSINOPHIL NFR BLD AUTO: 0.9 %
GLUCOSE SERPL-MCNC: 110 MG/DL
HCT VFR BLD CALC: 29.9 %
HGB BLD-MCNC: 9.8 G/DL
IMM GRANULOCYTES NFR BLD AUTO: 0.7 %
LYMPHOCYTES # BLD AUTO: 1.7 K/UL
LYMPHOCYTES NFR BLD AUTO: 18.8 %
MAN DIFF?: NORMAL
MCHC RBC-ENTMCNC: 31 PG
MCHC RBC-ENTMCNC: 32.8 GM/DL
MCV RBC AUTO: 94.6 FL
MONOCYTES # BLD AUTO: 1.04 K/UL
MONOCYTES NFR BLD AUTO: 11.5 %
NEUTROPHILS # BLD AUTO: 6.11 K/UL
NEUTROPHILS NFR BLD AUTO: 67.5 %
PLATELET # BLD AUTO: 519 K/UL
POTASSIUM SERPL-SCNC: 5.2 MMOL/L
RBC # BLD: 3.16 M/UL
RBC # FLD: 13.5 %
SODIUM SERPL-SCNC: 134 MMOL/L
WBC # FLD AUTO: 9.04 K/UL

## 2024-10-19 PROCEDURE — 93248 EXT ECG>7D<15D REV&INTERPJ: CPT

## 2024-10-22 ENCOUNTER — APPOINTMENT (OUTPATIENT)
Dept: OTOLARYNGOLOGY | Facility: CLINIC | Age: 79
End: 2024-10-22
Payer: MEDICARE

## 2024-10-22 VITALS — DIASTOLIC BLOOD PRESSURE: 73 MMHG | HEART RATE: 83 BPM | SYSTOLIC BLOOD PRESSURE: 126 MMHG | TEMPERATURE: 98 F

## 2024-10-22 DIAGNOSIS — R49.0 DYSPHONIA: ICD-10-CM

## 2024-10-22 PROCEDURE — 31575 DIAGNOSTIC LARYNGOSCOPY: CPT

## 2024-10-22 PROCEDURE — 99214 OFFICE O/P EST MOD 30 MIN: CPT | Mod: 25

## 2024-10-22 RX ORDER — FLUTICASONE PROPIONATE 50 UG/1
50 SPRAY, METERED NASAL DAILY
Qty: 1 | Refills: 3 | Status: ACTIVE | COMMUNITY
Start: 2024-10-22 | End: 1900-01-01

## 2024-10-24 ENCOUNTER — APPOINTMENT (OUTPATIENT)
Dept: ELECTROPHYSIOLOGY | Facility: CLINIC | Age: 79
End: 2024-10-24
Payer: MEDICARE

## 2024-10-24 ENCOUNTER — NON-APPOINTMENT (OUTPATIENT)
Age: 79
End: 2024-10-24

## 2024-10-24 VITALS
HEART RATE: 69 BPM | BODY MASS INDEX: 20.8 KG/M2 | OXYGEN SATURATION: 99 % | HEIGHT: 62 IN | WEIGHT: 113 LBS | SYSTOLIC BLOOD PRESSURE: 107 MMHG | DIASTOLIC BLOOD PRESSURE: 65 MMHG

## 2024-10-24 DIAGNOSIS — I47.10 SUPRAVENTRICULAR TACHYCARDIA, UNSPECIFIED: ICD-10-CM

## 2024-10-24 PROCEDURE — 99215 OFFICE O/P EST HI 40 MIN: CPT | Mod: 25

## 2024-10-24 PROCEDURE — 93000 ELECTROCARDIOGRAM COMPLETE: CPT

## 2024-10-31 ENCOUNTER — APPOINTMENT (OUTPATIENT)
Dept: CARDIOLOGY | Facility: CLINIC | Age: 79
End: 2024-10-31
Payer: MEDICARE

## 2024-10-31 DIAGNOSIS — D50.0 IRON DEFICIENCY ANEMIA SECONDARY TO BLOOD LOSS (CHRONIC): ICD-10-CM

## 2024-10-31 DIAGNOSIS — E87.1 HYPO-OSMOLALITY AND HYPONATREMIA: ICD-10-CM

## 2024-10-31 PROCEDURE — 93306 TTE W/DOPPLER COMPLETE: CPT

## 2024-11-01 LAB
ANION GAP SERPL CALC-SCNC: 13 MMOL/L
BASOPHILS # BLD AUTO: 0.06 K/UL
BASOPHILS NFR BLD AUTO: 0.8 %
BUN SERPL-MCNC: 10 MG/DL
CALCIUM SERPL-MCNC: 9.9 MG/DL
CHLORIDE SERPL-SCNC: 101 MMOL/L
CO2 SERPL-SCNC: 24 MMOL/L
CREAT SERPL-MCNC: 0.69 MG/DL
EGFR: 88 ML/MIN/1.73M2
EOSINOPHIL # BLD AUTO: 0.21 K/UL
EOSINOPHIL NFR BLD AUTO: 2.7 %
GLUCOSE SERPL-MCNC: 105 MG/DL
HCT VFR BLD CALC: 35.8 %
HGB BLD-MCNC: 11.3 G/DL
IMM GRANULOCYTES NFR BLD AUTO: 0.4 %
LYMPHOCYTES # BLD AUTO: 1.94 K/UL
LYMPHOCYTES NFR BLD AUTO: 24.5 %
MAN DIFF?: NORMAL
MCHC RBC-ENTMCNC: 30.5 PG
MCHC RBC-ENTMCNC: 31.6 G/DL
MCV RBC AUTO: 96.8 FL
MONOCYTES # BLD AUTO: 0.85 K/UL
MONOCYTES NFR BLD AUTO: 10.7 %
NEUTROPHILS # BLD AUTO: 4.83 K/UL
NEUTROPHILS NFR BLD AUTO: 60.9 %
PLATELET # BLD AUTO: 299 K/UL
POTASSIUM SERPL-SCNC: 4.6 MMOL/L
RBC # BLD: 3.7 M/UL
RBC # FLD: 13.8 %
SODIUM SERPL-SCNC: 139 MMOL/L
WBC # FLD AUTO: 7.92 K/UL

## 2024-11-02 ENCOUNTER — NON-APPOINTMENT (OUTPATIENT)
Age: 79
End: 2024-11-02

## 2024-11-06 PROCEDURE — 93248 EXT ECG>7D<15D REV&INTERPJ: CPT

## 2024-11-18 ENCOUNTER — APPOINTMENT (OUTPATIENT)
Dept: NEPHROLOGY | Facility: CLINIC | Age: 79
End: 2024-11-18
Payer: MEDICARE

## 2024-11-18 VITALS
SYSTOLIC BLOOD PRESSURE: 119 MMHG | HEART RATE: 76 BPM | OXYGEN SATURATION: 98 % | TEMPERATURE: 97.5 F | DIASTOLIC BLOOD PRESSURE: 72 MMHG | HEIGHT: 62 IN | BODY MASS INDEX: 20.8 KG/M2 | WEIGHT: 113 LBS

## 2024-11-18 PROCEDURE — 99204 OFFICE O/P NEW MOD 45 MIN: CPT

## 2024-11-18 PROCEDURE — G2211 COMPLEX E/M VISIT ADD ON: CPT

## 2024-11-20 ENCOUNTER — APPOINTMENT (OUTPATIENT)
Dept: INTERNAL MEDICINE | Facility: CLINIC | Age: 79
End: 2024-11-20
Payer: MEDICARE

## 2024-11-20 ENCOUNTER — OUTPATIENT (OUTPATIENT)
Dept: OUTPATIENT SERVICES | Facility: HOSPITAL | Age: 79
LOS: 1 days | End: 2024-11-20
Payer: MEDICARE

## 2024-11-20 VITALS
WEIGHT: 124 LBS | BODY MASS INDEX: 22.68 KG/M2 | DIASTOLIC BLOOD PRESSURE: 62 MMHG | OXYGEN SATURATION: 98 % | SYSTOLIC BLOOD PRESSURE: 122 MMHG | HEART RATE: 76 BPM

## 2024-11-20 DIAGNOSIS — R91.1 SOLITARY PULMONARY NODULE: ICD-10-CM

## 2024-11-20 DIAGNOSIS — M85.80 OTHER SPECIFIED DISORDERS OF BONE DENSITY AND STRUCTURE, UNSPECIFIED SITE: ICD-10-CM

## 2024-11-20 DIAGNOSIS — R10.10 UPPER ABDOMINAL PAIN, UNSPECIFIED: ICD-10-CM

## 2024-11-20 DIAGNOSIS — Z87.898 PERSONAL HISTORY OF OTHER SPECIFIED CONDITIONS: ICD-10-CM

## 2024-11-20 DIAGNOSIS — Z09 ENCOUNTER FOR FOLLOW-UP EXAMINATION AFTER COMPLETED TREATMENT FOR CONDITIONS OTHER THAN MALIGNANT NEOPLASM: ICD-10-CM

## 2024-11-20 DIAGNOSIS — Z00.00 ENCOUNTER FOR GENERAL ADULT MEDICAL EXAMINATION W/OUT ABNORMAL FINDINGS: ICD-10-CM

## 2024-11-20 DIAGNOSIS — E78.5 HYPERLIPIDEMIA, UNSPECIFIED: ICD-10-CM

## 2024-11-20 DIAGNOSIS — I10 ESSENTIAL (PRIMARY) HYPERTENSION: ICD-10-CM

## 2024-11-20 DIAGNOSIS — G62.9 POLYNEUROPATHY, UNSPECIFIED: ICD-10-CM

## 2024-11-20 DIAGNOSIS — D50.0 IRON DEFICIENCY ANEMIA SECONDARY TO BLOOD LOSS (CHRONIC): ICD-10-CM

## 2024-11-20 DIAGNOSIS — E87.1 HYPO-OSMOLALITY AND HYPONATREMIA: ICD-10-CM

## 2024-11-20 DIAGNOSIS — I47.10 SUPRAVENTRICULAR TACHYCARDIA, UNSPECIFIED: ICD-10-CM

## 2024-11-20 DIAGNOSIS — Z23 ENCOUNTER FOR IMMUNIZATION: ICD-10-CM

## 2024-11-20 PROCEDURE — G0008: CPT

## 2024-11-20 PROCEDURE — G0439: CPT

## 2024-11-20 PROCEDURE — 90662 IIV NO PRSV INCREASED AG IM: CPT

## 2024-11-20 RX ORDER — PNV NO.95/FERROUS FUM/FOLIC AC 28MG-0.8MG
TABLET ORAL
Refills: 0 | Status: ACTIVE | COMMUNITY

## 2024-11-27 ENCOUNTER — RESULT REVIEW (OUTPATIENT)
Age: 79
End: 2024-11-27

## 2024-11-27 ENCOUNTER — APPOINTMENT (OUTPATIENT)
Dept: RADIOLOGY | Facility: CLINIC | Age: 79
End: 2024-11-27
Payer: MEDICARE

## 2024-11-27 ENCOUNTER — APPOINTMENT (OUTPATIENT)
Dept: MAMMOGRAPHY | Facility: CLINIC | Age: 79
End: 2024-11-27
Payer: MEDICARE

## 2024-11-27 PROCEDURE — 77085 DXA BONE DENSITY AXL VRT FX: CPT

## 2024-11-27 PROCEDURE — 77063 BREAST TOMOSYNTHESIS BI: CPT

## 2024-11-27 PROCEDURE — 77067 SCR MAMMO BI INCL CAD: CPT

## 2024-11-29 ENCOUNTER — OUTPATIENT (OUTPATIENT)
Dept: OUTPATIENT SERVICES | Facility: HOSPITAL | Age: 79
LOS: 1 days | End: 2024-11-29
Payer: MEDICARE

## 2024-11-29 ENCOUNTER — APPOINTMENT (OUTPATIENT)
Dept: ULTRASOUND IMAGING | Facility: CLINIC | Age: 79
End: 2024-11-29

## 2024-11-29 ENCOUNTER — APPOINTMENT (OUTPATIENT)
Dept: CT IMAGING | Facility: CLINIC | Age: 79
End: 2024-11-29

## 2024-11-29 DIAGNOSIS — R94.6 ABNORMAL RESULTS OF THYROID FUNCTION STUDIES: ICD-10-CM

## 2024-11-29 DIAGNOSIS — R91.1 SOLITARY PULMONARY NODULE: ICD-10-CM

## 2024-11-29 PROCEDURE — 71250 CT THORAX DX C-: CPT | Mod: 26

## 2024-11-29 PROCEDURE — 76536 US EXAM OF HEAD AND NECK: CPT | Mod: 26

## 2024-11-29 PROCEDURE — 71250 CT THORAX DX C-: CPT

## 2024-11-29 PROCEDURE — 76536 US EXAM OF HEAD AND NECK: CPT

## 2024-12-02 ENCOUNTER — LABORATORY RESULT (OUTPATIENT)
Age: 79
End: 2024-12-02

## 2024-12-03 DIAGNOSIS — G62.9 POLYNEUROPATHY, UNSPECIFIED: ICD-10-CM

## 2024-12-03 DIAGNOSIS — R91.1 SOLITARY PULMONARY NODULE: ICD-10-CM

## 2024-12-03 DIAGNOSIS — I47.10 SUPRAVENTRICULAR TACHYCARDIA, UNSPECIFIED: ICD-10-CM

## 2024-12-03 DIAGNOSIS — Z23 ENCOUNTER FOR IMMUNIZATION: ICD-10-CM

## 2024-12-03 DIAGNOSIS — Z00.00 ENCOUNTER FOR GENERAL ADULT MEDICAL EXAMINATION WITHOUT ABNORMAL FINDINGS: ICD-10-CM

## 2024-12-03 DIAGNOSIS — E78.5 HYPERLIPIDEMIA, UNSPECIFIED: ICD-10-CM

## 2024-12-03 DIAGNOSIS — D50.0 IRON DEFICIENCY ANEMIA SECONDARY TO BLOOD LOSS (CHRONIC): ICD-10-CM

## 2024-12-03 DIAGNOSIS — E87.1 HYPO-OSMOLALITY AND HYPONATREMIA: ICD-10-CM

## 2024-12-03 DIAGNOSIS — M85.80 OTHER SPECIFIED DISORDERS OF BONE DENSITY AND STRUCTURE, UNSPECIFIED SITE: ICD-10-CM

## 2024-12-12 ENCOUNTER — APPOINTMENT (OUTPATIENT)
Dept: INTERNAL MEDICINE | Facility: CLINIC | Age: 79
End: 2024-12-12

## 2024-12-30 ENCOUNTER — OUTPATIENT (OUTPATIENT)
Dept: OUTPATIENT SERVICES | Facility: HOSPITAL | Age: 79
LOS: 1 days | End: 2024-12-30

## 2024-12-30 ENCOUNTER — APPOINTMENT (OUTPATIENT)
Dept: INTERNAL MEDICINE | Facility: CLINIC | Age: 79
End: 2024-12-30
Payer: MEDICARE

## 2024-12-30 DIAGNOSIS — J06.9 ACUTE UPPER RESPIRATORY INFECTION, UNSPECIFIED: ICD-10-CM

## 2024-12-30 PROCEDURE — 99441: CPT

## 2024-12-30 RX ORDER — NIRMATRELVIR AND RITONAVIR 300-100 MG
20 X 150 MG & KIT ORAL
Qty: 1 | Refills: 0 | Status: ACTIVE | COMMUNITY
Start: 2024-12-30 | End: 1900-01-01

## 2024-12-31 ENCOUNTER — NON-APPOINTMENT (OUTPATIENT)
Age: 79
End: 2024-12-31

## 2025-01-02 LAB — SARS-COV-2 N GENE NPH QL NAA+PROBE: DETECTED

## 2025-01-14 ENCOUNTER — APPOINTMENT (OUTPATIENT)
Dept: SURGERY | Facility: CLINIC | Age: 80
End: 2025-01-14
Payer: MEDICARE

## 2025-01-14 DIAGNOSIS — E04.1 NONTOXIC SINGLE THYROID NODULE: ICD-10-CM

## 2025-01-14 PROCEDURE — 99213 OFFICE O/P EST LOW 20 MIN: CPT

## 2025-03-14 ENCOUNTER — APPOINTMENT (OUTPATIENT)
Dept: INTERNAL MEDICINE | Facility: CLINIC | Age: 80
End: 2025-03-14

## 2025-03-14 ENCOUNTER — OUTPATIENT (OUTPATIENT)
Dept: OUTPATIENT SERVICES | Facility: HOSPITAL | Age: 80
LOS: 1 days | End: 2025-03-14

## 2025-03-14 VITALS
HEART RATE: 65 BPM | OXYGEN SATURATION: 97 % | BODY MASS INDEX: 22.15 KG/M2 | WEIGHT: 120.38 LBS | DIASTOLIC BLOOD PRESSURE: 60 MMHG | SYSTOLIC BLOOD PRESSURE: 114 MMHG | HEIGHT: 62 IN

## 2025-03-14 DIAGNOSIS — I10 ESSENTIAL (PRIMARY) HYPERTENSION: ICD-10-CM

## 2025-03-14 DIAGNOSIS — R25.2 CRAMP AND SPASM: ICD-10-CM

## 2025-03-14 PROCEDURE — G0463: CPT

## 2025-03-14 PROCEDURE — 99213 OFFICE O/P EST LOW 20 MIN: CPT

## 2025-03-14 PROCEDURE — G2211 COMPLEX E/M VISIT ADD ON: CPT

## 2025-03-14 RX ORDER — PSYLLIUM HUSK 0.4 G
CAPSULE ORAL
Refills: 0 | Status: ACTIVE | COMMUNITY

## 2025-05-29 ENCOUNTER — APPOINTMENT (OUTPATIENT)
Age: 80
End: 2025-05-29
Payer: MEDICARE

## 2025-05-29 ENCOUNTER — NON-APPOINTMENT (OUTPATIENT)
Age: 80
End: 2025-05-29

## 2025-05-29 ENCOUNTER — RX RENEWAL (OUTPATIENT)
Age: 80
End: 2025-05-29

## 2025-05-29 PROCEDURE — 92015 DETERMINE REFRACTIVE STATE: CPT

## 2025-05-29 PROCEDURE — 92004 COMPRE OPH EXAM NEW PT 1/>: CPT

## 2025-05-29 PROCEDURE — 92134 CPTRZ OPH DX IMG PST SGM RTA: CPT

## 2025-05-29 PROCEDURE — 92202 OPSCPY EXTND ON/MAC DRAW: CPT

## 2025-06-05 DIAGNOSIS — I10 ESSENTIAL (PRIMARY) HYPERTENSION: ICD-10-CM

## 2025-06-16 ENCOUNTER — RX RENEWAL (OUTPATIENT)
Age: 80
End: 2025-06-16

## 2025-07-30 NOTE — ED ADULT TRIAGE NOTE - PATIENT ON (OXYGEN DELIVERY METHOD)
[FreeTextEntry3] : This note was written by Lauren Andersen on (07/29/2025) acting as a medical scribe for Dr. James. This note was authored by the medical scribe for me. I have reviewed, edited, and revised the note as needed. I am in agreement with the exam findings, imaging findings, and treatment plan. Alessandro James MD 
room air
Admission

## (undated) DEVICE — SUT POLYSORB 2-0 18" TIES UNDYED

## (undated) DEVICE — DRSG STERISTRIPS 0.5 X 4"

## (undated) DEVICE — TROCAR COVIDIEN VERSAPORT BLADELESS OPTICAL 5MM STANDARD

## (undated) DEVICE — INSUFFLATION NDL COVIDIEN STEP 14G FOR STEP/VERSASTEP

## (undated) DEVICE — SCOPE WARMER SEAL DISP

## (undated) DEVICE — DRAPE 1/2 SHEET 40X57"

## (undated) DEVICE — WARMING BLANKET LOWER ADULT

## (undated) DEVICE — SUT POLYSORB 1 60" TIES

## (undated) DEVICE — LIGASURE IMPACT

## (undated) DEVICE — LIGASURE MARYLAND 37CM

## (undated) DEVICE — TROCAR COVIDIEN VERSAONE FIXATION CANNULA 5MM

## (undated) DEVICE — Device

## (undated) DEVICE — PACK LAP CHOLE LAP APPENDECTOMY

## (undated) DEVICE — DRSG TELFA 3 X 8

## (undated) DEVICE — VENODYNE/SCD SLEEVE CALF MEDIUM

## (undated) DEVICE — GOWN TRIMAX LG

## (undated) DEVICE — DRAIN PENROSE .25" X 18" LATEX

## (undated) DEVICE — POSITIONER FOAM EGG CRATE ULNAR 2PCS (PINK)

## (undated) DEVICE — TUBING STRYKEFLOW II SUCTION / IRRIGATOR

## (undated) DEVICE — SUT MAXON 1 36" GS-24

## (undated) DEVICE — SUT POLYSORB 0 18" MP UNDYED

## (undated) DEVICE — TUBING INSUFFLATION LAP FILTER 10FT

## (undated) DEVICE — DRAIN PENROSE .5" X 18" LATEX

## (undated) DEVICE — APPLICATOR VISTASEAL LAP DUAL 35CM RIGID

## (undated) DEVICE — TROCAR COVIDIEN BLUNT TIP HASSAN 10MM

## (undated) DEVICE — PACK MAJOR ABDOMINAL SUPINE

## (undated) DEVICE — WARMING BLANKET UPPER ADULT

## (undated) DEVICE — DRAPE GENERAL ENDOSCOPY

## (undated) DEVICE — TUBING IRRIGATION DAVOL SYSTEM X STREAM

## (undated) DEVICE — DRAIN CHANNEL 19FR ROUND FULL FLUTED

## (undated) DEVICE — VALVE YELLOW PORT SEAL PLUS 5MM

## (undated) DEVICE — TROCAR ETHICON ENDOPATH XCEL BLADELESS 12MM X 100MM STABILITY

## (undated) DEVICE — TROCAR COVIDIEN VERSASTEP PLUS 11MM STANDARD

## (undated) DEVICE — STAPLER SKIN VISI-STAT 35 WIDE

## (undated) DEVICE — TROCAR APPLIED MEDICAL KII BALLOON BLUNT TIP 12MM X 100MM

## (undated) DEVICE — DRAPE TOWEL BLUE 17" X 24"

## (undated) DEVICE — ENDOCATCH 10MM SPECIMEN POUCH

## (undated) DEVICE — SUT PDS II 0 18" ENDOLOOP LIGATURE

## (undated) DEVICE — DRAPE MAYO STAND 30"

## (undated) DEVICE — TROCAR COVIDIEN VERSASTEP PLUS 15MM STANDARD

## (undated) DEVICE — LAP PAD 18 X 18"

## (undated) DEVICE — ELCTR DISSECTOR ULTRASONIC CORDLESS CVD JAW 5MM-39CM

## (undated) DEVICE — GLV 8.5 PROTEXIS (WHITE)

## (undated) DEVICE — PACK BASIN SPECIAL PROCEDURE

## (undated) DEVICE — LIGASURE BLUNT TIP 37CM

## (undated) DEVICE — GLV 6.5 PROTEXIS (WHITE)

## (undated) DEVICE — MARKING PEN W RULER

## (undated) DEVICE — DRAIN RESERVOIR FOR JACKSON PRATT 100CC CARDINAL

## (undated) DEVICE — TROCAR APPLIED MEDICAL KII BALLOON BLUNT TIP 12MM X 130MM

## (undated) DEVICE — SUT POLYSORB 3-0 30" V-20 UNDYED

## (undated) DEVICE — PREP CHLORAPREP HI-LITE ORANGE 26ML

## (undated) DEVICE — DRAPE INSTRUMENT POUCH 6.75" X 11"

## (undated) DEVICE — D HELP - CLEARVIEW CLEARIFY SYSTEM

## (undated) DEVICE — SOL IRR POUR NS 0.9% 500ML

## (undated) DEVICE — ELCTR BOVIE PENCIL SMOKE EVACUATION

## (undated) DEVICE — SUT ENDOSTITCH DEVICE 10MM

## (undated) DEVICE — GLV 8 PROTEXIS (WHITE)

## (undated) DEVICE — GLV 7.5 PROTEXIS (WHITE)

## (undated) DEVICE — TROCAR COVIDIEN VERSASTEP 5MM STANDARD

## (undated) DEVICE — DRAIN JACKSON PRATT 10MM FLAT FULL NO TROCAR

## (undated) DEVICE — TROCAR COVIDIEN BLUNT TIP HASSAN 12MM

## (undated) DEVICE — LIGASURE ATLAS 10MM 37CM

## (undated) DEVICE — SUT SOFSILK 3-0 18" V-20 (POP-OFF)

## (undated) DEVICE — TUBING STRYKER PNEUMOCLEAR SMOKE HEAT HUMID

## (undated) DEVICE — TROCAR COVIDIEN VERSASTEP PLUS 12MM STANDARD

## (undated) DEVICE — STAPLER COVIDIEN ENDO GIA STANDARD HANDLE

## (undated) DEVICE — DRSG OPSITE 13.75 X 4"

## (undated) DEVICE — SOL IRR POUR H2O 250ML

## (undated) DEVICE — BLADE SCALPEL SAFETYLOCK #15

## (undated) DEVICE — TROCAR COVIDIEN STEP 5MM SHORT 70MM

## (undated) DEVICE — SHEARS COVIDIEN ENDO SHEAR 5MM X 31CM W UNIPOLAR CAUTERY

## (undated) DEVICE — DRSG TAPE UMBILICAL COTTON 2" X 30 X 1/8"

## (undated) DEVICE — TUBING SUCTION 20FT

## (undated) DEVICE — TRAP SPECIMEN SPUTUM 40CC

## (undated) DEVICE — VISITEC 4X4

## (undated) DEVICE — DRAPE IOBAN 23" X 23"

## (undated) DEVICE — PACK COLON BUNDLE

## (undated) DEVICE — SHEARS HARMONIC ACE 5MM X 36CM CURVED TIP

## (undated) DEVICE — SUT PDS II 1 48" TP-1

## (undated) DEVICE — PACKING GAUZE IODOFORM 0.5"

## (undated) DEVICE — BLADE SCALPEL SAFETYLOCK #10

## (undated) DEVICE — INSUFFLATION NDL COVIDIEN STEP 14G SHORT FOR STEP/VERSASTEP

## (undated) DEVICE — FOLEY TRAY 16FR 5CC LTX UMETER CLOSED

## (undated) DEVICE — MEDICATION LABELS W MARKER

## (undated) DEVICE — SPECIMEN CONTAINER 100ML

## (undated) DEVICE — DRSG VAC ABTHERS

## (undated) DEVICE — TROCAR ETHICON ENDOPATH XCEL BLADELESS 5MM X 100MM STABILITY

## (undated) DEVICE — GLV 7 PROTEXIS (WHITE)

## (undated) DEVICE — SUT SOFSILK 2-0 18" V-20 (POP-OFF)

## (undated) DEVICE — ELCTR CORD FOOTSWITCH 1PLR LAPSCP 10FT

## (undated) DEVICE — DRSG TEGADERM 6"X8"